# Patient Record
Sex: FEMALE | Race: WHITE | NOT HISPANIC OR LATINO | URBAN - METROPOLITAN AREA
[De-identification: names, ages, dates, MRNs, and addresses within clinical notes are randomized per-mention and may not be internally consistent; named-entity substitution may affect disease eponyms.]

---

## 2019-09-06 ENCOUNTER — INPATIENT (INPATIENT)
Facility: HOSPITAL | Age: 74
LOS: 12 days | Discharge: NURSING, REHAB OR LONG-TERM CARE | DRG: 125 | End: 2019-09-19
Attending: INTERNAL MEDICINE | Admitting: INTERNAL MEDICINE
Payer: MEDICARE

## 2019-09-06 VITALS
TEMPERATURE: 98 F | SYSTOLIC BLOOD PRESSURE: 161 MMHG | WEIGHT: 130.07 LBS | HEART RATE: 80 BPM | OXYGEN SATURATION: 98 % | HEIGHT: 66 IN | RESPIRATION RATE: 18 BRPM | DIASTOLIC BLOOD PRESSURE: 76 MMHG

## 2019-09-06 LAB
ALBUMIN SERPL ELPH-MCNC: 3.9 G/DL — SIGNIFICANT CHANGE UP (ref 3.5–5)
ALP SERPL-CCNC: 81 U/L — SIGNIFICANT CHANGE UP (ref 40–120)
ALT FLD-CCNC: 15 U/L DA — SIGNIFICANT CHANGE UP (ref 10–60)
ANION GAP SERPL CALC-SCNC: 11 MMOL/L — SIGNIFICANT CHANGE UP (ref 5–17)
AST SERPL-CCNC: 15 U/L — SIGNIFICANT CHANGE UP (ref 10–40)
BILIRUB SERPL-MCNC: 1.1 MG/DL — SIGNIFICANT CHANGE UP (ref 0.2–1.2)
BUN SERPL-MCNC: 17 MG/DL — SIGNIFICANT CHANGE UP (ref 7–18)
CALCIUM SERPL-MCNC: 9 MG/DL — SIGNIFICANT CHANGE UP (ref 8.4–10.5)
CHLORIDE SERPL-SCNC: 92 MMOL/L — LOW (ref 96–108)
CO2 SERPL-SCNC: 24 MMOL/L — SIGNIFICANT CHANGE UP (ref 22–31)
CREAT SERPL-MCNC: 0.74 MG/DL — SIGNIFICANT CHANGE UP (ref 0.5–1.3)
GLUCOSE SERPL-MCNC: 106 MG/DL — HIGH (ref 70–99)
HCT VFR BLD CALC: 43.6 % — SIGNIFICANT CHANGE UP (ref 34.5–45)
HGB BLD-MCNC: 14.4 G/DL — SIGNIFICANT CHANGE UP (ref 11.5–15.5)
MCHC RBC-ENTMCNC: 29.9 PG — SIGNIFICANT CHANGE UP (ref 27–34)
MCHC RBC-ENTMCNC: 33 GM/DL — SIGNIFICANT CHANGE UP (ref 32–36)
MCV RBC AUTO: 90.6 FL — SIGNIFICANT CHANGE UP (ref 80–100)
POTASSIUM SERPL-MCNC: 3.6 MMOL/L — SIGNIFICANT CHANGE UP (ref 3.5–5.3)
POTASSIUM SERPL-SCNC: 3.6 MMOL/L — SIGNIFICANT CHANGE UP (ref 3.5–5.3)
PROT SERPL-MCNC: 8.2 G/DL — SIGNIFICANT CHANGE UP (ref 6–8.3)
RBC # BLD: 4.81 M/UL — SIGNIFICANT CHANGE UP (ref 3.8–5.2)
RBC # FLD: 12 % — SIGNIFICANT CHANGE UP (ref 10.3–14.5)
SODIUM SERPL-SCNC: 127 MMOL/L — LOW (ref 135–145)
TROPONIN I SERPL-MCNC: <0.015 NG/ML — SIGNIFICANT CHANGE UP (ref 0–0.04)
WBC # BLD: 6.57 K/UL — SIGNIFICANT CHANGE UP (ref 3.8–10.5)
WBC # FLD AUTO: 6.57 K/UL — SIGNIFICANT CHANGE UP (ref 3.8–10.5)

## 2019-09-06 PROCEDURE — 70450 CT HEAD/BRAIN W/O DYE: CPT | Mod: 26

## 2019-09-06 PROCEDURE — 71045 X-RAY EXAM CHEST 1 VIEW: CPT | Mod: 26

## 2019-09-06 RX ORDER — VALACYCLOVIR 500 MG/1
1000 TABLET, FILM COATED ORAL ONCE
Refills: 0 | Status: COMPLETED | OUTPATIENT
Start: 2019-09-06 | End: 2019-09-06

## 2019-09-06 RX ADMIN — Medication 40 MILLIGRAM(S): at 22:01

## 2019-09-06 RX ADMIN — VALACYCLOVIR 1000 MILLIGRAM(S): 500 TABLET, FILM COATED ORAL at 22:01

## 2019-09-06 NOTE — ED PROVIDER NOTE - NS ED ROS FT
Generalized weakness x 3 days  Right upper forehead and eye weakness Generalized weakness x 3 days  Right upper forehead and eye redness

## 2019-09-06 NOTE — ED PROVIDER NOTE - CHPI ED SYMPTOMS NEG
no headache, cp, sob, dysura, abd pain, burning, itching, visual changes no headache, cp, sob, dysuria, abd pain, burning, itching, visual changes

## 2019-09-06 NOTE — ED ADULT TRIAGE NOTE - CHIEF COMPLAINT QUOTE
send by nephew for eval. generalized weakness x 3 days. swelling & redness, rash  on rt.eye. denies any fall. vomitted x1 today

## 2019-09-06 NOTE — ED PROVIDER NOTE - ATTESTATION, MLM
Nausea, vomiting, and diarrhea
I have reviewed and confirmed nurses' notes for patient's medications, allergies, medical history, and surgical history.

## 2019-09-06 NOTE — ED PROVIDER NOTE - OBJECTIVE STATEMENT
Patient is a 73 y/o female with PMHx schizophrenia and psych admissions (taking risperidone) accompanied by her nephew for generalized weakness x 3 days along with right upper forehead and eye redness; she has also been noncompliant with her medication. Patient additionally notes 1 episode of nbnb emesis. Her nephew states that she is at baseline. Patient denies burning, itching, visual changes, headache, chest pain, sob, dysuria, abd pain, burning, itching, visual changes or any other acute complaints. NKDA. Patient is a 75 y/o female with PMHx schizophrenia and psych admissions (taking risperidone) accompanied by her nephew presenting for generalized weakness x 3 days along with right upper forehead and eye redness; she has also been noncompliant with her medication. Patient additionally notes 1 episode of nbnb emesis. Her nephew states that she is at baseline. Patient denies burning, itching, visual changes, headache, chest pain, sob, dysuria, abd pain, burning, itching, visual changes or any other acute complaints. NKDA.

## 2019-09-06 NOTE — ED PROVIDER NOTE - CLINICAL SUMMARY MEDICAL DECISION MAKING FREE TEXT BOX
75 y/o female pt with generalized weakness, shingles on v1 dermatone w/ no other involvement noted. Will rule out acs, intercranial lesion versus uti versus baseline schizophrenia. Labs, ct head, ekg, urine. Give dose of valtrex, prednisone, likely will admit.

## 2019-09-06 NOTE — ED ADULT TRIAGE NOTE - ACCOMPANIED BY
PHYSICAL THERAPY RE-EVALUATION    Referring Provider:  Yeny Rahman    Diagnosis:       ICD-10-CM ICD-9-CM    1. S/P ACL reconstruction Z98.890 V45.89      Orders:  Evaluate and Treat    Date of Initial Evaluation: 12/21/17 (re-eval needed for 4/28/18)    Visit # 27    BACKGROUND:  Patient reports that approximately 1 year ago is when she initially tore her ACL. She had been performing prehab for several months until she was finally able to undergo surgery on 12/14/17. She underwent an allograft and has Wbing precautions at 40%. She reports her pain as not been too bad, but she is noticing some swelling.      SUBJECTIVE: Patient reports her knee has been doing about the same.     OBJECTIVE:          AT EVAL       TODAY  Gait: Antalgic to L side, decreased weight shift, decreased heel to toe    Sensation: intact to light touch     Knee AROM:  Flexion      WNL      Extension    WNL  Knee PROM  Flexion      WNL      Extension    WNL        Strength:  Quadriceps    4      Hamstrings    4+     Gluteus Medius   4      Gluteus Jaskaran   4     Hip Flexors    4             Function:  LEFS 55% disability on initial evaluation. Patient scored a 23.75% on re-evaluation on the LEFS on re-evaluation.    Tenderness to palpation:  Slight tenderness to L distal incision    ASSESSMENT: Patient demonstrated good tolerance to there ex and noted improvement in soft tissue to posterior knee and more movement with less pain.    TREATMENT PROVIDED:  -Manual Therapy: 8 min  STM to posterior L knee  -Therapeutic Exercise:  35 min  LE bike x 8 min  Side step ups  Sidestepping against TB  High knees/butt kicks/toe touches  Ball toss SL   Lateral lunges  Leg extension 15#  Leg press 55#  Standing gastroc stretch  Wall squats with ball  Ankle arch strengthening  -Modalities: 15 min ice + TENS to L knee with prolonged knee extension  -Education: 5 min: on condition and HEP    PLAN:  Patient will benefit from physical therapy (2-3) x/week  for (8-12) weeks including manual therapy, therapeutic exercise, functional activities, modalities, and patient education.    Thank you for this referral.    These services are reasonable and necessary for the conditions set forth above while under my care.    Yudi Mercado, PT, DPT   84K/EMT/paramedic

## 2019-09-06 NOTE — ED PROVIDER NOTE - PHYSICAL EXAMINATION
Face: vesicular lesion in v1 dermatone right  Psych:  unkempt  ao x 3  Neuro:  cranial 2-12 intact  Eyes:   PERRL  Injected on the right  No dendritic pattern noted on fluorescein stain  IOP 10  HEENT:   no lesion In ear or nose  MSK:  Extremities normal  GI:  Abd soft

## 2019-09-06 NOTE — ED PROVIDER NOTE - CHPI ED SYMPTOMS POS
VOMITING/weakness, r upper forehead & eye redness nbnb vomiting, generalized weakness, r upper forehead & eye redness/VOMITING

## 2019-09-07 DIAGNOSIS — F20.0 PARANOID SCHIZOPHRENIA: ICD-10-CM

## 2019-09-07 DIAGNOSIS — R53.1 WEAKNESS: ICD-10-CM

## 2019-09-07 DIAGNOSIS — Z29.9 ENCOUNTER FOR PROPHYLACTIC MEASURES, UNSPECIFIED: ICD-10-CM

## 2019-09-07 DIAGNOSIS — F20.9 SCHIZOPHRENIA, UNSPECIFIED: ICD-10-CM

## 2019-09-07 DIAGNOSIS — E87.1 HYPO-OSMOLALITY AND HYPONATREMIA: ICD-10-CM

## 2019-09-07 DIAGNOSIS — B02.30 ZOSTER OCULAR DISEASE, UNSPECIFIED: ICD-10-CM

## 2019-09-07 LAB
ANION GAP SERPL CALC-SCNC: 10 MMOL/L — SIGNIFICANT CHANGE UP (ref 5–17)
APPEARANCE UR: CLEAR — SIGNIFICANT CHANGE UP
BACTERIA # UR AUTO: ABNORMAL /HPF
BASOPHILS # BLD AUTO: 0 K/UL — SIGNIFICANT CHANGE UP (ref 0–0.2)
BASOPHILS NFR BLD AUTO: 0 % — SIGNIFICANT CHANGE UP (ref 0–2)
BILIRUB UR-MCNC: NEGATIVE — SIGNIFICANT CHANGE UP
BUN SERPL-MCNC: 16 MG/DL — SIGNIFICANT CHANGE UP (ref 7–18)
CALCIUM SERPL-MCNC: 7.8 MG/DL — LOW (ref 8.4–10.5)
CHLORIDE SERPL-SCNC: 98 MMOL/L — SIGNIFICANT CHANGE UP (ref 96–108)
CO2 SERPL-SCNC: 22 MMOL/L — SIGNIFICANT CHANGE UP (ref 22–31)
COLOR SPEC: YELLOW — SIGNIFICANT CHANGE UP
COMMENT - URINE: SIGNIFICANT CHANGE UP
CREAT SERPL-MCNC: 0.52 MG/DL — SIGNIFICANT CHANGE UP (ref 0.5–1.3)
DIFF PNL FLD: ABNORMAL
EOSINOPHIL # BLD AUTO: 0 K/UL — SIGNIFICANT CHANGE UP (ref 0–0.5)
EOSINOPHIL NFR BLD AUTO: 0 % — SIGNIFICANT CHANGE UP (ref 0–6)
EPI CELLS # UR: SIGNIFICANT CHANGE UP /HPF
GLUCOSE SERPL-MCNC: 105 MG/DL — HIGH (ref 70–99)
GLUCOSE UR QL: NEGATIVE — SIGNIFICANT CHANGE UP
GRAN CASTS # UR COMP ASSIST: ABNORMAL /LPF
HBA1C BLD-MCNC: 6.3 % — HIGH (ref 4–5.6)
HCT VFR BLD CALC: 34.6 % — SIGNIFICANT CHANGE UP (ref 34.5–45)
HGB BLD-MCNC: 11.8 G/DL — SIGNIFICANT CHANGE UP (ref 11.5–15.5)
KETONES UR-MCNC: ABNORMAL
LEUKOCYTE ESTERASE UR-ACNC: ABNORMAL
LYMPHOCYTES # BLD AUTO: 2.17 K/UL — SIGNIFICANT CHANGE UP (ref 1–3.3)
LYMPHOCYTES # BLD AUTO: 33 % — SIGNIFICANT CHANGE UP (ref 13–44)
MAGNESIUM SERPL-MCNC: 2 MG/DL — SIGNIFICANT CHANGE UP (ref 1.6–2.6)
MCHC RBC-ENTMCNC: 29.6 PG — SIGNIFICANT CHANGE UP (ref 27–34)
MCHC RBC-ENTMCNC: 34.1 GM/DL — SIGNIFICANT CHANGE UP (ref 32–36)
MCV RBC AUTO: 86.7 FL — SIGNIFICANT CHANGE UP (ref 80–100)
MONOCYTES # BLD AUTO: 0.46 K/UL — SIGNIFICANT CHANGE UP (ref 0–0.9)
MONOCYTES NFR BLD AUTO: 7 % — SIGNIFICANT CHANGE UP (ref 2–14)
NEUTROPHILS # BLD AUTO: 3.94 K/UL — SIGNIFICANT CHANGE UP (ref 1.8–7.4)
NEUTROPHILS NFR BLD AUTO: 60 % — SIGNIFICANT CHANGE UP (ref 43–77)
NITRITE UR-MCNC: NEGATIVE — SIGNIFICANT CHANGE UP
NRBC # BLD: 0 /100 WBCS — SIGNIFICANT CHANGE UP (ref 0–0)
PH UR: 6 — SIGNIFICANT CHANGE UP (ref 5–8)
PHOSPHATE SERPL-MCNC: 3 MG/DL — SIGNIFICANT CHANGE UP (ref 2.5–4.5)
PLATELET # BLD AUTO: 140 K/UL — LOW (ref 150–400)
PLATELET # BLD AUTO: 194 K/UL — SIGNIFICANT CHANGE UP (ref 150–400)
POTASSIUM SERPL-MCNC: 3.7 MMOL/L — SIGNIFICANT CHANGE UP (ref 3.5–5.3)
POTASSIUM SERPL-SCNC: 3.7 MMOL/L — SIGNIFICANT CHANGE UP (ref 3.5–5.3)
PROT UR-MCNC: 15
RBC # BLD: 3.99 M/UL — SIGNIFICANT CHANGE UP (ref 3.8–5.2)
RBC # FLD: 12 % — SIGNIFICANT CHANGE UP (ref 10.3–14.5)
RBC CASTS # UR COMP ASSIST: SIGNIFICANT CHANGE UP /HPF (ref 0–2)
SODIUM SERPL-SCNC: 130 MMOL/L — LOW (ref 135–145)
SP GR SPEC: 1.02 — SIGNIFICANT CHANGE UP (ref 1.01–1.02)
TSH SERPL-MCNC: 1.28 UU/ML — SIGNIFICANT CHANGE UP (ref 0.34–4.82)
UROBILINOGEN FLD QL: NEGATIVE — SIGNIFICANT CHANGE UP
VIT B12 SERPL-MCNC: 318 PG/ML — SIGNIFICANT CHANGE UP (ref 232–1245)
WBC # BLD: 6 K/UL — SIGNIFICANT CHANGE UP (ref 3.8–10.5)
WBC # FLD AUTO: 6 K/UL — SIGNIFICANT CHANGE UP (ref 3.8–10.5)
WBC UR QL: SIGNIFICANT CHANGE UP /HPF (ref 0–5)

## 2019-09-07 PROCEDURE — 99285 EMERGENCY DEPT VISIT HI MDM: CPT

## 2019-09-07 RX ORDER — SODIUM CHLORIDE 9 MG/ML
1000 INJECTION INTRAMUSCULAR; INTRAVENOUS; SUBCUTANEOUS
Refills: 0 | Status: DISCONTINUED | OUTPATIENT
Start: 2019-09-07 | End: 2019-09-07

## 2019-09-07 RX ORDER — NEOMYCIN SULFATE, POLYMYXIN B SULFATE AND HYDROCORTISONE 3.5; 10000; 1 MG/ML; [USP'U]/ML; MG/ML
1 SUSPENSION OPHTHALMIC THREE TIMES A DAY
Refills: 0 | Status: COMPLETED | OUTPATIENT
Start: 2019-09-07 | End: 2019-09-12

## 2019-09-07 RX ORDER — AMLODIPINE BESYLATE 2.5 MG/1
5 TABLET ORAL DAILY
Refills: 0 | Status: DISCONTINUED | OUTPATIENT
Start: 2019-09-07 | End: 2019-09-13

## 2019-09-07 RX ORDER — SODIUM CHLORIDE 9 MG/ML
1000 INJECTION INTRAMUSCULAR; INTRAVENOUS; SUBCUTANEOUS
Refills: 0 | Status: DISCONTINUED | OUTPATIENT
Start: 2019-09-07 | End: 2019-09-09

## 2019-09-07 RX ORDER — ENOXAPARIN SODIUM 100 MG/ML
40 INJECTION SUBCUTANEOUS DAILY
Refills: 0 | Status: DISCONTINUED | OUTPATIENT
Start: 2019-09-07 | End: 2019-09-19

## 2019-09-07 RX ORDER — VALACYCLOVIR 500 MG/1
1000 TABLET, FILM COATED ORAL THREE TIMES A DAY
Refills: 0 | Status: DISCONTINUED | OUTPATIENT
Start: 2019-09-07 | End: 2019-09-16

## 2019-09-07 RX ADMIN — SODIUM CHLORIDE 80 MILLILITER(S): 9 INJECTION INTRAMUSCULAR; INTRAVENOUS; SUBCUTANEOUS at 04:01

## 2019-09-07 RX ADMIN — VALACYCLOVIR 1000 MILLIGRAM(S): 500 TABLET, FILM COATED ORAL at 07:12

## 2019-09-07 RX ADMIN — NEOMYCIN SULFATE, POLYMYXIN B SULFATE AND HYDROCORTISONE 1 DROP(S): 3.5; 10000; 1 SUSPENSION OPHTHALMIC at 20:23

## 2019-09-07 RX ADMIN — ENOXAPARIN SODIUM 40 MILLIGRAM(S): 100 INJECTION SUBCUTANEOUS at 14:15

## 2019-09-07 RX ADMIN — VALACYCLOVIR 1000 MILLIGRAM(S): 500 TABLET, FILM COATED ORAL at 14:14

## 2019-09-07 RX ADMIN — VALACYCLOVIR 1000 MILLIGRAM(S): 500 TABLET, FILM COATED ORAL at 22:23

## 2019-09-07 RX ADMIN — SODIUM CHLORIDE 80 MILLILITER(S): 9 INJECTION INTRAMUSCULAR; INTRAVENOUS; SUBCUTANEOUS at 07:11

## 2019-09-07 RX ADMIN — SODIUM CHLORIDE 75 MILLILITER(S): 9 INJECTION INTRAMUSCULAR; INTRAVENOUS; SUBCUTANEOUS at 22:24

## 2019-09-07 RX ADMIN — AMLODIPINE BESYLATE 5 MILLIGRAM(S): 2.5 TABLET ORAL at 14:14

## 2019-09-07 NOTE — H&P ADULT - RS GEN PE MLT RESP DETAILS PC
no chest wall tenderness/clear to auscultation bilaterally/good air movement/respirations non-labored/breath sounds equal/airway patent

## 2019-09-07 NOTE — H&P ADULT - MUSCULOSKELETAL
detailed exam no joint swelling/no joint erythema/normal strength/ROM intact/no calf tenderness/no joint warmth details…

## 2019-09-07 NOTE — CONSULT NOTE ADULT - SUBJECTIVE AND OBJECTIVE BOX
HPI:  Pt is 73 y/o F with PMH of schizophrenia who presented with generalized weakness since 2 days. According to the pt, since few days she has been feeling weak and has difficultly ambulating. She has right upper forehead and eye redness. She denied pain and itching.  Pt seems confused at  times. She denied fever, shortness of breath, chest pain, loss of consciousness and all other review of systems except mentioned above. (07 Sep 2019 01:35)      PAST MEDICAL & SURGICAL HISTORY:  Schizophrenia  No significant past surgical history      Allergy Status Unknown      Meds:  amLODIPine   Tablet 5 milliGRAM(s) Oral daily  enoxaparin Injectable 40 milliGRAM(s) SubCutaneous daily  sodium chloride 0.9%. 1000 milliLiter(s) IV Continuous <Continuous>  valACYclovir 1000 milliGRAM(s) Oral three times a day      SOCIAL HISTORY:  Smoker:  YES / NO        PACK YEARS:                         WHEN QUIT?  ETOH use:  YES / NO               FREQUENCY / QUANTITY:  Ilicit Drug use:  YES / NO  Occupation:  Assisted device use (Cane / Walker):  Live with:    FAMILY HISTORY:  No pertinent family history in first degree relatives      VITALS:  Vital Signs Last 24 Hrs  T(C): 37.1 (07 Sep 2019 08:08), Max: 37.1 (07 Sep 2019 08:08)  T(F): 98.8 (07 Sep 2019 08:08), Max: 98.8 (07 Sep 2019 08:08)  HR: 51 (07 Sep 2019 08:08) (51 - 80)  BP: 180/60 (07 Sep 2019 08:08) (161/76 - 180/60)  BP(mean): --  RR: 18 (07 Sep 2019 08:08) (17 - 18)  SpO2: 100% (07 Sep 2019 08:08) (98% - 100%)    LABS/DIAGNOSTIC TESTS:                          11.8   6.00  )-----------( 194      ( 07 Sep 2019 05:14 )             34.6     WBC Count: 6.00 K/uL ( @ 05:14)  WBC Count: 6.57 K/uL ( @ 23:26)          130<L>  |  98  |  16  ----------------------------<  105<H>  3.7   |  22  |  0.52    Ca    7.8<L>      07 Sep 2019 05:14  Phos  3.0     -  Mg     2.0     -    TPro  8.2  /  Alb  3.9  /  TBili  1.1  /  DBili  x   /  AST  15  /  ALT  15  /  AlkPhos  81  -      Urinalysis Basic - ( 07 Sep 2019 11:42 )    Color: Yellow / Appearance: Clear / S.020 / pH: x  Gluc: x / Ketone: Moderate  / Bili: Negative / Urobili: Negative   Blood: x / Protein: 15 / Nitrite: Negative   Leuk Esterase: Small / RBC: 0-2 /HPF / WBC 3-5 /HPF   Sq Epi: x / Non Sq Epi: Few /HPF / Bacteria: Few /HPF        LIVER FUNCTIONS - ( 06 Sep 2019 23:26 )  Alb: 3.9 g/dL / Pro: 8.2 g/dL / ALK PHOS: 81 U/L / ALT: 15 U/L DA / AST: 15 U/L / GGT: x                 LACTATE:    ABG -     CULTURES:       RADIOLOGY:      ROS  [  ] UNABLE TO ELICIT HPI:  Pt is 73 y/o F with PMH of schizophrenia who presented with generalized weakness since 2 days. According to the pt, since few days she has been feeling weak and has difficultly ambulating. She has right upper forehead and eye redness. She denied pain and itching.  Pt seems confused at  times. She denied fever, shortness of breath, chest pain, loss of consciousness and all other review of systems except mentioned above. (07 Sep 2019 01:35)    History as above, she is confused and a poor historian, she is sleepy and is asking for the nurse to come in so she can go to the bathroom, asked  to see patient as she has shingles over her face. she c/o pain.      PAST MEDICAL & SURGICAL HISTORY:  Schizophrenia  No significant past surgical history      Allergy Status Unknown      Meds:  amLODIPine   Tablet 5 milliGRAM(s) Oral daily  enoxaparin Injectable 40 milliGRAM(s) SubCutaneous daily  sodium chloride 0.9%. 1000 milliLiter(s) IV Continuous <Continuous>  valACYclovir 1000 milliGRAM(s) Oral three times a day      SOCIAL HISTORY: unknown    FAMILY HISTORY:  No pertinent family history in first degree relatives      VITALS:  Vital Signs Last 24 Hrs  T(C): 37.1 (07 Sep 2019 08:08), Max: 37.1 (07 Sep 2019 08:08)  T(F): 98.8 (07 Sep 2019 08:08), Max: 98.8 (07 Sep 2019 08:08)  HR: 51 (07 Sep 2019 08:08) (51 - 80)  BP: 180/60 (07 Sep 2019 08:08) (161/76 - 180/60)  BP(mean): --  RR: 18 (07 Sep 2019 08:08) (17 - 18)  SpO2: 100% (07 Sep 2019 08:08) (98% - 100%)    LABS/DIAGNOSTIC TESTS:                          11.8   6.00  )-----------( 194      ( 07 Sep 2019 05:14 )             34.6     WBC Count: 6.00 K/uL ( @ 05:14)  WBC Count: 6.57 K/uL ( @ 23:26)          130<L>  |  98  |  16  ----------------------------<  105<H>  3.7   |  22  |  0.52    Ca    7.8<L>      07 Sep 2019 05:14  Phos  3.0       Mg     2.0         TPro  8.2  /  Alb  3.9  /  TBili  1.1  /  DBili  x   /  AST  15  /  ALT  15  /  AlkPhos  81        Urinalysis Basic - ( 07 Sep 2019 11:42 )    Color: Yellow / Appearance: Clear / S.020 / pH: x  Gluc: x / Ketone: Moderate  / Bili: Negative / Urobili: Negative   Blood: x / Protein: 15 / Nitrite: Negative   Leuk Esterase: Small / RBC: 0-2 /HPF / WBC 3-5 /HPF   Sq Epi: x / Non Sq Epi: Few /HPF / Bacteria: Few /HPF        LIVER FUNCTIONS - ( 06 Sep 2019 23:26 )  Alb: 3.9 g/dL / Pro: 8.2 g/dL / ALK PHOS: 81 U/L / ALT: 15 U/L DA / AST: 15 U/L / GGT: x                 LACTATE:    ABG -     CULTURES:       RADIOLOGY:< from: CT Head No Cont (19 @ 22:21) >  EXAM:  CT BRAIN                            PROCEDURE DATE:  2019          INTERPRETATION:    Clinical Indication: Right V1 shingles.    Comparison: None    Technique: Noncontrast axial CT images of the head were acquired. Coronal   and sagittal reformats were obtained.    Findings:   The ventricles and sulci are prominent in size, compatible with mild   generalized parenchymal volume loss. No acute intracranial hemorrhage is   identified.  No extra-axial fluid collection is identified.  No mass   effect or midline shift is seen.  There is no evidence of acute   territorial infarct.  There are periventricular and subcortical white   matter hypodensities, which are nonspecific, but likely reflect mild   microvascular ischemic disease.   The visualized paranasal sinuses are clear. The mastoid air cells are   well aerated.  No acute osseous abnormality is seen.       Impression: No CT evidence of acute intracranial abnormality.                SREEDHAR BROOKS M.D., ATTENDING RADIOLOGIST  This document has been electronically signed. Sep  6 2019 10:30PM        < end of copied text >        ROS  [ x ] UNABLE TO ELICIT

## 2019-09-07 NOTE — BEHAVIORAL HEALTH ASSESSMENT NOTE - NSBHCONSULTRECOMMENDOTHER_PSY_A_CORE FT
social work consult.  To schedule f/u appnt with a psychiatrist. 1. Pt DOES appear to have capacity to consent to DAVID  --If pt again displays ambivalence about recommended care, recommend reasoning with her, as she appears open to education and persuasion  2. Pt has stated she does not want family involved in her care, but again recommend persuasion and education to gain her cooperation rather than imposing family involvement over her objection  3. Recommend offering home psychiatric medication (Risperdal 0.25 mg q12h), but pt has the right to refuse as she is not acutely psychotic at this time  4. Psychiatry is signing off. Reconsult if additional issues arise as inpatient  5. Case d/w Yaquelin Rojas NP of primary team    Idalia Iniguez MD  Director, Consultation-Liaison Psychiatry Service  [id number]

## 2019-09-07 NOTE — BEHAVIORAL HEALTH ASSESSMENT NOTE - NSBHREFERDETAILS_PSY_A_CORE_FT
Patient with h/o of Schizophrenia not on meds currently was admitted with generalized weakness. Capacity to consent to DAVID

## 2019-09-07 NOTE — H&P ADULT - PROBLEM SELECTOR PLAN 1
Pt presented with generalized weakness  CT head did not show acute infarct or hemorrhage  F/u vitamin b12, vitamin D, TSH  PT evaluation

## 2019-09-07 NOTE — H&P ADULT - PROBLEM SELECTOR PLAN 5
IMPROVE VTE Individual Risk Assessment  RISK                                                          Points  [  ] Previous VTE                                                3  [  ] Thrombophilia                                             2  [  ] Lower limb paralysis                                    2        (unable to hold up >15 seconds)    [  ] Current Cancer                                             2         (within 6 months)  [  x] Immobilization > 24 hrs                              1  [  ] ICU/CCU stay > 24 hours                            1  [x  ] Age > 60                                                    1  IMPROVE VTE Score _________2    Lovenox for DVT prophylaxis

## 2019-09-07 NOTE — BEHAVIORAL HEALTH ASSESSMENT NOTE - NSBHCHARTREVIEWVS_PSY_A_CORE FT
Vital Signs Last 24 Hrs  T(C): 37.1 (07 Sep 2019 08:08), Max: 37.1 (07 Sep 2019 08:08)  T(F): 98.8 (07 Sep 2019 08:08), Max: 98.8 (07 Sep 2019 08:08)  HR: 51 (07 Sep 2019 08:08) (51 - 80)  BP: 180/60 (07 Sep 2019 08:08) (161/76 - 180/60)  BP(mean): --  RR: 18 (07 Sep 2019 08:08) (17 - 18)  SpO2: 100% (07 Sep 2019 08:08) (98% - 100%) Vital Signs Last 24 Hrs  T(C): 36.7 (13 Sep 2019 15:52), Max: 36.7 (13 Sep 2019 00:26)  T(F): 98.1 (13 Sep 2019 15:52), Max: 98.1 (13 Sep 2019 15:52)  HR: 111 (13 Sep 2019 15:52) (62 - 111)  BP: 144/77 (13 Sep 2019 15:52) (144/77 - 173/59)  BP(mean): --  RR: 16 (13 Sep 2019 15:52) (16 - 16)  SpO2: 99% (13 Sep 2019 15:52) (97% - 99%)

## 2019-09-07 NOTE — BEHAVIORAL HEALTH ASSESSMENT NOTE - HPI (INCLUDE ILLNESS QUALITY, SEVERITY, DURATION, TIMING, CONTEXT, MODIFYING FACTORS, ASSOCIATED SIGNS AND SYMPTOMS)
HPI:  Pt is 73 y/o F with PMH of schizophrenia who presented with generalized weakness since 2 days. According to the pt, since few days she has been feeling weak and has difficultly ambulating. She has right upper forehead and eye redness. She denied pain and itching.  Pt seems confused at  times. She denied fever, shortness of breath, chest pain, loss of consciousness and all other review of systems except mentioned above. (07 Sep 2019 01:35)    Patient with h/o inpatient psychTx was on Risperdal as per family member.  Patient was interviewed,chart was reviewed.Case was discussed with MD.  Patient stated: "I don't need psychiatrist any more"  Patient is very defensive, minimally verbal, periodically giggles , behaviorally controlled.  c/o poor night sleep.  Denied using alcohol or drugs.  Stated living alone.  Patient is a/o x2, poor eye contact, suspicious.  Speech is goal directed illigal at times.  Denied a/v hallucinations.Denied s/h thought.  Has paranoid delusions.Memory and cognititon-limited.I&J-limited. 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 Psych consult originally requested on 9/7 for evaluation, pt seen by Dr. Lubin who found no acute issues and signed off. New consult was requested today for capacity, as team has recommended DAVID and pt has reportedly been reluctant to agree. Pt seen in her room, calm and cooperative. 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 Pt proves to be fairly well oriented (September 2019, "close to 9-11," MARCOS off by 1 day). When recounting her personal hx, pt demonstrates some mild paranoia (she believes that a group of "people in the virtual world" is pursuing her to try to get money they think she inherited from her grandfather), but her thinking otherwise appears to be linear. Pt describes mood as "fine" and denies SI/HI/AVH. Pt completes most of the MOCA and performs well in naming, digit span, subtraction, language and abstraction, but has trouble with visual tasks (she does not have her glasses) and delayed recall. Pt reports that she lives alone without home services, relying on her super for assistance from time to time, and is very attached to her independence. However, when MD tells her it would be much better if she were to agree to DAVID to ensure a safe recovery, and afterwards to agree to more help at home, pt agrees, saying, "I would rather not, but I'll go under duress."

## 2019-09-07 NOTE — BEHAVIORAL HEALTH ASSESSMENT NOTE - SUMMARY
Pt is 73 y/o F with PMH of schizophrenia who presented with generalized weakness since 2 days. According to the pt, since few days she has been feeling weak and has difficultly ambulating. She has right upper forehead and eye redness. She denied pain and itching.  Pt seems confused at  times. She denied fever, shortness of breath, chest pain, loss of consciousness and all other review of systems except mentioned above. (07 Sep 2019 01:35)    Patient with h/o inpatient psychTx was on Risperdal as per family member.  Patient was interviewed,chart was reviewed.Case was discussed with MD.  Patient stated: "I don't need psychiatrist any more"  Patient is very defensive, minimally verbal, periodically giggles , behaviorally controlled.  c/o poor night sleep.  Denied using alcohol or drugs.  Stated living alone.  Patient is a/o x2, poor eye contact, suspicious.  Speech is goal directed illigal at times.  Denied a/v hallucinations.Denied s/h thought.  Has paranoid delusions.Memory and cognititon-limited.I&J-limited. 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 Psych consult originally requested on 9/7 for evaluation, pt seen by Dr. Lubin who found no acute issues and signed off. New consult was requested today for capacity, as team has recommended DAVID and pt has reportedly been reluctant to agree. On exam, pt is pleasant, cooperative and appropriate in affect, and apart from what appear to be persistent mild paranoid delusions about unknown people trying to get her grandfather's money, she appears to have reasonably good basic reality testing and no overt s/s of psychosis. While pt very much values her independence and prefers to live on her own, she appears able to comprehend and process MD's advice that it would be coulter for her to agree to DAVID and home services on DC in order to enable her to continue living at home safely i/s/o increasing frailty. Pt now states that she agrees to DAVID and appears to have capacity to do so. Pt does not appear to present an acute risk of harm to self or others at the time of assessment, and does not appear to be in need of admission to IP psych at the time of assessment.

## 2019-09-07 NOTE — CONSULT NOTE ADULT - GASTROINTESTINAL DETAILS
no guarding/no organomegaly/no rigidity/no masses palpable/bowel sounds normal/no distention/soft/nontender

## 2019-09-07 NOTE — BEHAVIORAL HEALTH ASSESSMENT NOTE - OTHER PAST PSYCHIATRIC HISTORY (INCLUDE DETAILS REGARDING ONSET, COURSE OF ILLNESS, INPATIENT/OUTPATIENT TREATMENT)
H/o paranoid schizophrenia per chart (no details available). Pt says her daughter had her involuntarily admitted to IP psych for a couple of weeks in the recent past.

## 2019-09-07 NOTE — H&P ADULT - ASSESSMENT
Pt is 75 y/o F with PMH of schizophrenia who presented with generalized weakness since 2 days.                          14.4   6.57  )-----------( 140      ( 06 Sep 2019 23:26 )             43.6       09-06    127<L>  |  92<L>  |  17  ----------------------------<  106<H>  3.6   |  24  |  0.74    Ca    9.0      06 Sep 2019 23:26    TPro  8.2  /  Alb  3.9  /  TBili  1.1  /  DBili  x   /  AST  15  /  ALT  15  /  AlkPhos  81  09-06

## 2019-09-07 NOTE — BEHAVIORAL HEALTH ASSESSMENT NOTE - NSBHCHARTREVIEWIMAGING_PSY_A_CORE FT
EXAM:  CT BRAIN                            PROCEDURE DATE:  09/06/2019          INTERPRETATION:    Clinical Indication: Right V1 shingles.    Comparison: None    Technique: Noncontrast axial CT images of the head were acquired. Coronal   and sagittal reformats were obtained.    Findings:   The ventricles and sulci are prominent in size, compatible with mild   generalized parenchymal volume loss. No acute intracranial hemorrhage is   identified.  No extra-axial fluid collection is identified.  No mass   effect or midline shift is seen.  There is no evidence of acute   territorial infarct.  There are periventricular and subcortical white   matter hypodensities, which are nonspecific, but likely reflect mild   microvascular ischemic disease.   The visualized paranasal sinuses are clear. The mastoid air cells are   well aerated.  No acute osseous abnormality is seen.       Impression: No CT evidence of acute intracranial abnormality.

## 2019-09-07 NOTE — BEHAVIORAL HEALTH ASSESSMENT NOTE - NSBHCHARTREVIEWLAB_PSY_A_CORE FT
CBC Full  -  ( 07 Sep 2019 05:14 )  WBC Count : 6.00 K/uL  RBC Count : 3.99 M/uL  Hemoglobin : 11.8 g/dL  Hematocrit : 34.6 %  Platelet Count - Automated : 194 K/uL  Mean Cell Volume : 86.7 fl  Mean Cell Hemoglobin : 29.6 pg  Mean Cell Hemoglobin Concentration : 34.1 gm/dL  Auto Neutrophil # : x  Auto Lymphocyte # : x  Auto Monocyte # : x  Auto Eosinophil # : x  Auto Basophil # : x  Auto Neutrophil % : x  Auto Lymphocyte % : x  Auto Monocyte % : x  Auto Eosinophil % : x  Auto Basophil % : x

## 2019-09-07 NOTE — BEHAVIORAL HEALTH ASSESSMENT NOTE - RISK ASSESSMENT
Pt's risk factors are older age and reported paranoid schizophrenia. She does have protective factors including absent h/o suicidality, aggression or WHYTE, above-average intelligence, future orientation and enjoyment of life. Risk level appears low at the time of assessment.

## 2019-09-07 NOTE — H&P ADULT - PROBLEM SELECTOR PLAN 4
IMPROVE VTE Individual Risk Assessment  RISK                                                          Points  [  ] Previous VTE                                                3  [  ] Thrombophilia                                             2  [  ] Lower limb paralysis                                    2        (unable to hold up >15 seconds)    [  ] Current Cancer                                             2         (within 6 months)  [  x] Immobilization > 24 hrs                              1  [  ] ICU/CCU stay > 24 hours                            1  [x  ] Age > 60                                                    1  IMPROVE VTE Score _________2    Lovenox for DVT prophylaxis Pt takes risperidone at home  Continue home medications Pt takes risperidone at home, not sure about dosage  We will need to call pharmacy for medications.  Continue home medications Pt takes risperidone at home, not sure about dosage  We will need to call pharmacy for medications. Family unaware of her home medications  Continue home medications

## 2019-09-07 NOTE — H&P ADULT - ATTENDING COMMENTS
Seen and examined . I feel weak. Agree with above A/P . Psychiatry and ID consulted .BP hiogh so started Amlodipine.

## 2019-09-07 NOTE — H&P ADULT - HISTORY OF PRESENT ILLNESS
Pt is 73 y/o F with PMH of schizophrenia who presented with generalized weakness since 2 days. According to the pt, since few days she has been feeling weak and has difficultly ambulating. Pt seems confused at  times. She denied fever, shortness of breath, chest pain, loss of consciousness and all other review of systems except mentioned above. Pt is 73 y/o F with PMH of schizophrenia who presented with generalized weakness since 2 days. According to the pt, since few days she has been feeling weak and has difficultly ambulating. She has right upper forehead and eye redness. She denied pain and itching.  Pt seems confused at  times. She denied fever, shortness of breath, chest pain, loss of consciousness and all other review of systems except mentioned above.

## 2019-09-07 NOTE — H&P ADULT - NEGATIVE OPHTHALMOLOGIC SYMPTOMS
no blurred vision L/no lacrimation R/no lacrimation L/no photophobia/no diplopia no blurred vision L/no blurred vision R/no lacrimation L/no lacrimation R/no diplopia/no photophobia

## 2019-09-07 NOTE — CONSULT NOTE ADULT - ASSESSMENT
Herpes Zoster   Right eye Conjunctivitis    Plan - cont Valtrex 1gm po TID  start cortisporin eyedrops 1gtt in right eye TID x 5 days.

## 2019-09-07 NOTE — ED ADULT NURSE REASSESSMENT NOTE - NS ED NURSE REASSESS COMMENT FT1
Pt reassessed, observed laying in bed, breathing room air, in no respiratory distress at time of reassessment. Pt is A&O x2, confused, however able to make needs known. Skin intact, except for rash observed to right eye area and right side of forehead, no sacral ulcers noted. Right AC #[id number] in place, meds administered as ordered. Admitted to [facility], report given to JUDIT Mitchell for [address]. Awaiting transport, nursing monitoring continues.

## 2019-09-08 LAB
ANION GAP SERPL CALC-SCNC: 5 MMOL/L — SIGNIFICANT CHANGE UP (ref 5–17)
ANION GAP SERPL CALC-SCNC: 6 MMOL/L — SIGNIFICANT CHANGE UP (ref 5–17)
BUN SERPL-MCNC: 11 MG/DL — SIGNIFICANT CHANGE UP (ref 7–18)
BUN SERPL-MCNC: 12 MG/DL — SIGNIFICANT CHANGE UP (ref 7–18)
CALCIUM SERPL-MCNC: 7.9 MG/DL — LOW (ref 8.4–10.5)
CALCIUM SERPL-MCNC: 8.1 MG/DL — LOW (ref 8.4–10.5)
CHLORIDE SERPL-SCNC: 95 MMOL/L — LOW (ref 96–108)
CHLORIDE SERPL-SCNC: 99 MMOL/L — SIGNIFICANT CHANGE UP (ref 96–108)
CO2 SERPL-SCNC: 25 MMOL/L — SIGNIFICANT CHANGE UP (ref 22–31)
CO2 SERPL-SCNC: 29 MMOL/L — SIGNIFICANT CHANGE UP (ref 22–31)
CREAT SERPL-MCNC: 0.68 MG/DL — SIGNIFICANT CHANGE UP (ref 0.5–1.3)
CREAT SERPL-MCNC: 0.73 MG/DL — SIGNIFICANT CHANGE UP (ref 0.5–1.3)
CULTURE RESULTS: SIGNIFICANT CHANGE UP
GLUCOSE SERPL-MCNC: 123 MG/DL — HIGH (ref 70–99)
GLUCOSE SERPL-MCNC: 202 MG/DL — HIGH (ref 70–99)
HCT VFR BLD CALC: 38.4 % — SIGNIFICANT CHANGE UP (ref 34.5–45)
HGB BLD-MCNC: 12.8 G/DL — SIGNIFICANT CHANGE UP (ref 11.5–15.5)
MAGNESIUM SERPL-MCNC: 1.9 MG/DL — SIGNIFICANT CHANGE UP (ref 1.6–2.6)
MCHC RBC-ENTMCNC: 29.2 PG — SIGNIFICANT CHANGE UP (ref 27–34)
MCHC RBC-ENTMCNC: 33.3 GM/DL — SIGNIFICANT CHANGE UP (ref 32–36)
MCV RBC AUTO: 87.7 FL — SIGNIFICANT CHANGE UP (ref 80–100)
NRBC # BLD: 0 /100 WBCS — SIGNIFICANT CHANGE UP (ref 0–0)
PLATELET # BLD AUTO: 210 K/UL — SIGNIFICANT CHANGE UP (ref 150–400)
POTASSIUM SERPL-MCNC: 2.8 MMOL/L — CRITICAL LOW (ref 3.5–5.3)
POTASSIUM SERPL-MCNC: 4.2 MMOL/L — SIGNIFICANT CHANGE UP (ref 3.5–5.3)
POTASSIUM SERPL-SCNC: 2.8 MMOL/L — CRITICAL LOW (ref 3.5–5.3)
POTASSIUM SERPL-SCNC: 4.2 MMOL/L — SIGNIFICANT CHANGE UP (ref 3.5–5.3)
RBC # BLD: 4.38 M/UL — SIGNIFICANT CHANGE UP (ref 3.8–5.2)
RBC # FLD: 12.2 % — SIGNIFICANT CHANGE UP (ref 10.3–14.5)
SODIUM SERPL-SCNC: 129 MMOL/L — LOW (ref 135–145)
SODIUM SERPL-SCNC: 130 MMOL/L — LOW (ref 135–145)
SPECIMEN SOURCE: SIGNIFICANT CHANGE UP
WBC # BLD: 6.51 K/UL — SIGNIFICANT CHANGE UP (ref 3.8–10.5)
WBC # FLD AUTO: 6.51 K/UL — SIGNIFICANT CHANGE UP (ref 3.8–10.5)

## 2019-09-08 RX ORDER — POTASSIUM CHLORIDE 20 MEQ
10 PACKET (EA) ORAL
Refills: 0 | Status: COMPLETED | OUTPATIENT
Start: 2019-09-08 | End: 2019-09-08

## 2019-09-08 RX ORDER — POTASSIUM CHLORIDE 20 MEQ
40 PACKET (EA) ORAL EVERY 4 HOURS
Refills: 0 | Status: COMPLETED | OUTPATIENT
Start: 2019-09-08 | End: 2019-09-08

## 2019-09-08 RX ADMIN — ENOXAPARIN SODIUM 40 MILLIGRAM(S): 100 INJECTION SUBCUTANEOUS at 11:52

## 2019-09-08 RX ADMIN — AMLODIPINE BESYLATE 5 MILLIGRAM(S): 2.5 TABLET ORAL at 06:11

## 2019-09-08 RX ADMIN — Medication 100 MILLIEQUIVALENT(S): at 14:54

## 2019-09-08 RX ADMIN — VALACYCLOVIR 1000 MILLIGRAM(S): 500 TABLET, FILM COATED ORAL at 21:25

## 2019-09-08 RX ADMIN — Medication 100 MILLIEQUIVALENT(S): at 11:52

## 2019-09-08 RX ADMIN — NEOMYCIN SULFATE, POLYMYXIN B SULFATE AND HYDROCORTISONE 1 DROP(S): 3.5; 10000; 1 SUSPENSION OPHTHALMIC at 13:02

## 2019-09-08 RX ADMIN — VALACYCLOVIR 1000 MILLIGRAM(S): 500 TABLET, FILM COATED ORAL at 06:11

## 2019-09-08 RX ADMIN — NEOMYCIN SULFATE, POLYMYXIN B SULFATE AND HYDROCORTISONE 1 DROP(S): 3.5; 10000; 1 SUSPENSION OPHTHALMIC at 06:11

## 2019-09-08 RX ADMIN — Medication 100 MILLIEQUIVALENT(S): at 13:01

## 2019-09-08 RX ADMIN — Medication 40 MILLIEQUIVALENT(S): at 11:52

## 2019-09-08 RX ADMIN — Medication 40 MILLIEQUIVALENT(S): at 14:54

## 2019-09-08 RX ADMIN — NEOMYCIN SULFATE, POLYMYXIN B SULFATE AND HYDROCORTISONE 1 DROP(S): 3.5; 10000; 1 SUSPENSION OPHTHALMIC at 21:25

## 2019-09-08 RX ADMIN — SODIUM CHLORIDE 75 MILLILITER(S): 9 INJECTION INTRAMUSCULAR; INTRAVENOUS; SUBCUTANEOUS at 11:50

## 2019-09-08 RX ADMIN — VALACYCLOVIR 1000 MILLIGRAM(S): 500 TABLET, FILM COATED ORAL at 13:02

## 2019-09-08 NOTE — PROGRESS NOTE ADULT - ASSESSMENT
Problem/Plan - 1:  ·  Problem: Generalized weakness.  Plan: Unknown cause .  Pt presented with generalized weakness  CT head did not show acute infarct or hemorrhage  F/u vitamin b12, vitamin D, TSH  PT evaluation.      Problem/Plan - 2:  ·  Problem: Herpes zoster ophthalmicus, right eye.  Plan: Pt has erythema and scaly lesion at V1 dermatome, around right eye  Continue valacyclovir.   ID helping .   Problem/Plan - 3:  ·  Problem: Hyponatremia/ Hypokalemia .  Plan: Correcting.     Problem/Plan - 4:  ·  Problem: Schizophrenia.  Plan: Pt takes risperidone at home, not sure about dosage  We will need to call pharmacy for medications. Family unaware of her home medications  Continue home medications.      Problem/Plan - 5:  ·  Problem: Prophylactic measure.  Plan:  Lovenox for DVT prophylaxis.     Disposition : DC planning . standing/actual

## 2019-09-08 NOTE — PROGRESS NOTE ADULT - SUBJECTIVE AND OBJECTIVE BOX
INTERVAL HPI/OVERNIGHT EVENTS: i feel weak n  Vital Signs Last 24 Hrs  T(C): 36.7 (08 Sep 2019 15:51), Max: 37.2 (08 Sep 2019 00:11)  T(F): 98.1 (08 Sep 2019 15:51), Max: 99 (08 Sep 2019 00:11)  HR: 75 (08 Sep 2019 15:51) (69 - 79)  BP: 155/54 (08 Sep 2019 15:51) (152/62 - 169/76)  BP(mean): 97 (08 Sep 2019 07:40) (97 - 97)  RR: 18 (08 Sep 2019 15:51) (17 - 18)  SpO2: 100% (08 Sep 2019 15:51) (99% - 100%)  I&O's Summary    MEDICATIONS  (STANDING):  amLODIPine   Tablet 5 milliGRAM(s) Oral daily  enoxaparin Injectable 40 milliGRAM(s) SubCutaneous daily  hydrocortisone/polymyxin/neomycin Suspension (CORTISPORIN) 1 Drop(s) Right EYE three times a day  sodium chloride 0.9%. 1000 milliLiter(s) (75 mL/Hr) IV Continuous <Continuous>  valACYclovir 1000 milliGRAM(s) Oral three times a day    MEDICATIONS  (PRN):    LABS:                        12.8   6.51  )-----------( 210      ( 08 Sep 2019 07:02 )             38.4     09-08    130<L>  |  99  |  12  ----------------------------<  202<H>  4.2   |  25  |  0.73    Ca    8.1<L>      08 Sep 2019 18:28  Phos  3.0     09-07  Mg     1.9     -08    TPro  8.2  /  Alb  3.9  /  TBili  1.1  /  DBili  x   /  AST  15  /  ALT  15  /  AlkPhos  81  09-06      Urinalysis Basic - ( 07 Sep 2019 11:42 )    Color: Yellow / Appearance: Clear / S.020 / pH: x  Gluc: x / Ketone: Moderate  / Bili: Negative / Urobili: Negative   Blood: x / Protein: 15 / Nitrite: Negative   Leuk Esterase: Small / RBC: 0-2 /HPF / WBC 3-5 /HPF   Sq Epi: x / Non Sq Epi: Few /HPF / Bacteria: Few /HPF      CAPILLARY BLOOD GLUCOSE            Urinalysis Basic - ( 07 Sep 2019 11:42 )    Color: Yellow / Appearance: Clear / S.020 / pH: x  Gluc: x / Ketone: Moderate  / Bili: Negative / Urobili: Negative   Blood: x / Protein: 15 / Nitrite: Negative   Leuk Esterase: Small / RBC: 0-2 /HPF / WBC 3-5 /HPF   Sq Epi: x / Non Sq Epi: Few /HPF / Bacteria: Few /HPF        RADIOLOGY & ADDITIONAL TESTS:    Consultant(s) Notes Reviewed:  [x ] YES  [ ] NO    PHYSICAL EXAM:  GENERAL: NAD, well-groomed, well-developed,not in any distress ,  HEAD:  Atraumatic, Normocephalic  EYES: EOMI, PERRLA, conjunctiva and sclera congestion RT eye   ENMT: No tonsillar erythema, exudates, or enlargement; Moist mucous membranes, Good dentition, No lesions  NECK: Supple, No JVD, Normal thyroid  NERVOUS SYSTEM:  Alert & Oriented X2, No focal deficit   CHEST/LUNG: Good air entry bilateral with no  rales, rhonchi, wheezing, or rubs  HEART: Regular rate and rhythm; No murmurs, rubs, or gallops  ABDOMEN: Soft, Nontender, Nondistended; Bowel sounds present  EXTREMITIES:  2+ Peripheral Pulses, No clubbing, cyanosis, or edema  SKIN: Rt side face and scalp     Care Discussed with Consultants/Other Providers [ x] YES  [ ] NO

## 2019-09-09 ENCOUNTER — TRANSCRIPTION ENCOUNTER (OUTPATIENT)
Age: 74
End: 2019-09-09

## 2019-09-09 DIAGNOSIS — R73.03 PREDIABETES: ICD-10-CM

## 2019-09-09 DIAGNOSIS — I10 ESSENTIAL (PRIMARY) HYPERTENSION: ICD-10-CM

## 2019-09-09 DIAGNOSIS — H10.9 UNSPECIFIED CONJUNCTIVITIS: ICD-10-CM

## 2019-09-09 LAB
ANION GAP SERPL CALC-SCNC: 6 MMOL/L — SIGNIFICANT CHANGE UP (ref 5–17)
BUN SERPL-MCNC: 10 MG/DL — SIGNIFICANT CHANGE UP (ref 7–18)
CALCIUM SERPL-MCNC: 7.7 MG/DL — LOW (ref 8.4–10.5)
CHLORIDE SERPL-SCNC: 98 MMOL/L — SIGNIFICANT CHANGE UP (ref 96–108)
CO2 SERPL-SCNC: 26 MMOL/L — SIGNIFICANT CHANGE UP (ref 22–31)
CREAT SERPL-MCNC: 0.51 MG/DL — SIGNIFICANT CHANGE UP (ref 0.5–1.3)
GLUCOSE SERPL-MCNC: 123 MG/DL — HIGH (ref 70–99)
MAGNESIUM SERPL-MCNC: 1.8 MG/DL — SIGNIFICANT CHANGE UP (ref 1.6–2.6)
POTASSIUM SERPL-MCNC: 3.4 MMOL/L — LOW (ref 3.5–5.3)
POTASSIUM SERPL-SCNC: 3.4 MMOL/L — LOW (ref 3.5–5.3)
SODIUM SERPL-SCNC: 130 MMOL/L — LOW (ref 135–145)

## 2019-09-09 RX ORDER — ACETAMINOPHEN 500 MG
650 TABLET ORAL EVERY 6 HOURS
Refills: 0 | Status: DISCONTINUED | OUTPATIENT
Start: 2019-09-09 | End: 2019-09-19

## 2019-09-09 RX ORDER — POTASSIUM CHLORIDE 20 MEQ
40 PACKET (EA) ORAL EVERY 4 HOURS
Refills: 0 | Status: COMPLETED | OUTPATIENT
Start: 2019-09-09 | End: 2019-09-09

## 2019-09-09 RX ORDER — RISPERIDONE 4 MG/1
0.25 TABLET ORAL EVERY 12 HOURS
Refills: 0 | Status: DISCONTINUED | OUTPATIENT
Start: 2019-09-09 | End: 2019-09-19

## 2019-09-09 RX ORDER — MAGNESIUM OXIDE 400 MG ORAL TABLET 241.3 MG
400 TABLET ORAL
Refills: 0 | Status: COMPLETED | OUTPATIENT
Start: 2019-09-09 | End: 2019-09-10

## 2019-09-09 RX ADMIN — RISPERIDONE 0.25 MILLIGRAM(S): 4 TABLET ORAL at 18:41

## 2019-09-09 RX ADMIN — Medication 650 MILLIGRAM(S): at 18:40

## 2019-09-09 RX ADMIN — Medication 650 MILLIGRAM(S): at 19:19

## 2019-09-09 RX ADMIN — Medication 650 MILLIGRAM(S): at 23:45

## 2019-09-09 RX ADMIN — AMLODIPINE BESYLATE 5 MILLIGRAM(S): 2.5 TABLET ORAL at 06:31

## 2019-09-09 RX ADMIN — MAGNESIUM OXIDE 400 MG ORAL TABLET 400 MILLIGRAM(S): 241.3 TABLET ORAL at 13:59

## 2019-09-09 RX ADMIN — VALACYCLOVIR 1000 MILLIGRAM(S): 500 TABLET, FILM COATED ORAL at 22:34

## 2019-09-09 RX ADMIN — Medication 40 MILLIEQUIVALENT(S): at 13:59

## 2019-09-09 RX ADMIN — MAGNESIUM OXIDE 400 MG ORAL TABLET 400 MILLIGRAM(S): 241.3 TABLET ORAL at 18:40

## 2019-09-09 RX ADMIN — NEOMYCIN SULFATE, POLYMYXIN B SULFATE AND HYDROCORTISONE 1 DROP(S): 3.5; 10000; 1 SUSPENSION OPHTHALMIC at 06:31

## 2019-09-09 RX ADMIN — VALACYCLOVIR 1000 MILLIGRAM(S): 500 TABLET, FILM COATED ORAL at 13:59

## 2019-09-09 RX ADMIN — Medication 650 MILLIGRAM(S): at 12:35

## 2019-09-09 RX ADMIN — ENOXAPARIN SODIUM 40 MILLIGRAM(S): 100 INJECTION SUBCUTANEOUS at 11:36

## 2019-09-09 RX ADMIN — NEOMYCIN SULFATE, POLYMYXIN B SULFATE AND HYDROCORTISONE 1 DROP(S): 3.5; 10000; 1 SUSPENSION OPHTHALMIC at 22:35

## 2019-09-09 RX ADMIN — Medication 650 MILLIGRAM(S): at 11:35

## 2019-09-09 RX ADMIN — NEOMYCIN SULFATE, POLYMYXIN B SULFATE AND HYDROCORTISONE 1 DROP(S): 3.5; 10000; 1 SUSPENSION OPHTHALMIC at 13:59

## 2019-09-09 RX ADMIN — Medication 40 MILLIEQUIVALENT(S): at 18:41

## 2019-09-09 RX ADMIN — VALACYCLOVIR 1000 MILLIGRAM(S): 500 TABLET, FILM COATED ORAL at 06:31

## 2019-09-09 NOTE — PROGRESS NOTE ADULT - PROBLEM SELECTOR PLAN 2
Erythema and scaly lesion at V1 dermatome, around right eye  Continue valacyclovir.  ID, Dr. Matos following

## 2019-09-09 NOTE — PROGRESS NOTE ADULT - PROBLEM SELECTOR PLAN 1
Pt presented with generalized weakness  CT head did not show acute infarct or hemorrhage  awaiting PT evaluation.

## 2019-09-09 NOTE — PROGRESS NOTE ADULT - SUBJECTIVE AND OBJECTIVE BOX
NP Note discussed with Primary Attending    75 y/o F with PMH of schizophrenia who presented with generalized weakness since 2 days with difficultly ambulating. Patient noted to have right upper forehead rash and right eye redness.      INTERVAL HPI/OVERNIGHT EVENTS: Awake, lying in bed, in no acute distress    MEDICATIONS  (STANDING):  amLODIPine   Tablet 5 milliGRAM(s) Oral daily  enoxaparin Injectable 40 milliGRAM(s) SubCutaneous daily  hydrocortisone/polymyxin/neomycin Suspension (CORTISPORIN) 1 Drop(s) Right EYE three times a day  magnesium oxide 400 milliGRAM(s) Oral three times a day with meals  potassium chloride    Tablet ER 40 milliEquivalent(s) Oral every 4 hours  risperiDONE   Tablet 0.25 milliGRAM(s) Oral every 12 hours  sodium chloride 0.9%. 1000 milliLiter(s) (75 mL/Hr) IV Continuous <Continuous>  valACYclovir 1000 milliGRAM(s) Oral three times a day    MEDICATIONS  (PRN):  acetaminophen   Tablet .. 650 milliGRAM(s) Oral every 6 hours PRN Mild Pain (1 - 3)      __________________________________________________  REVIEW OF SYSTEMS:    CONSTITUTIONAL: No fever, no chills  EYES: no acute visual disturbances  NECK: No pain or stiffness  RESPIRATORY: No cough; No shortness of breath  CARDIOVASCULAR: No chest pain, no palpitations  GASTROINTESTINAL: No pain. No nausea or vomiting; No diarrhea   NEUROLOGICAL: No headache or numbness, no tremors  MUSCULOSKELETAL: No joint pain, no muscle pain  GENITOURINARY: no dysuria, no frequency, no hesitancy  PSYCHIATRY: h/o schizophrenia      Vital Signs Last 24 Hrs  T(C): 37.3 (09 Sep 2019 00:39), Max: 37.3 (09 Sep 2019 00:39)  T(F): 99.2 (09 Sep 2019 00:39), Max: 99.2 (09 Sep 2019 00:39)  HR: 78 (09 Sep 2019 05:43) (70 - 78)  BP: 135/71 (09 Sep 2019 05:43) (135/71 - 155/54)  BP(mean): --  RR: 17 (09 Sep 2019 00:39) (17 - 18)  SpO2: 98% (09 Sep 2019 00:39) (98% - 100%)    ________________________________________________  PHYSICAL EXAM:  GENERAL: NAD  HEENT: Normocephalic; right eye redness, left conjunctivae and sclerae clear; moist mucous membranes   NECK: supple, no jvd  CHEST/LUNG: Clear to auscultation bilaterally with good air entry   HEART: S1 S2 regular; no murmurs, gallops or rubs  ABDOMEN: Soft, nontender, nondistended; bowel sounds present  EXTREMITIES: no cyanosis; no edema; no calf tenderness  SKIN: warm and dry; rash to right forehead  NERVOUS SYSTEM:  Awake and alert; Oriented to place, person and time; no new deficits    _________________________________________________  LABS:                        12.8   6.51  )-----------( 210      ( 08 Sep 2019 07:02 )             38.4     09-09    130<L>  |  98  |  10  ----------------------------<  123<H>  3.4<L>   |  26  |  0.51    Ca    7.7<L>      09 Sep 2019 07:09  Mg     1.8     09-09          CAPILLARY BLOOD GLUCOSE            RADIOLOGY & ADDITIONAL TESTS:    Consultant(s) Notes Reviewed:   YES    Care Discussed with Consultants : Psychiatry, ID    Plan of care was discussed with patient and /or primary care giver; all questions and concerns were addressed and care was aligned with patient's wishes.

## 2019-09-09 NOTE — PROGRESS NOTE ADULT - SUBJECTIVE AND OBJECTIVE BOX
INTERVAL HPI/OVERNIGHT EVENTS: I feel fine.   Vital Signs Last 24 Hrs  T(C): 36.7 (09 Sep 2019 15:43), Max: 37.3 (09 Sep 2019 00:39)  T(F): 98 (09 Sep 2019 15:43), Max: 99.2 (09 Sep 2019 00:39)  HR: 75 (09 Sep 2019 15:43) (70 - 78)  BP: 134/46 (09 Sep 2019 15:43) (134/46 - 151/55)  BP(mean): --  RR: 18 (09 Sep 2019 15:43) (17 - 18)  SpO2: 95% (09 Sep 2019 15:43) (95% - 98%)  I&O's Summary    MEDICATIONS  (STANDING):  amLODIPine   Tablet 5 milliGRAM(s) Oral daily  enoxaparin Injectable 40 milliGRAM(s) SubCutaneous daily  hydrocortisone/polymyxin/neomycin Suspension (CORTISPORIN) 1 Drop(s) Right EYE three times a day  magnesium oxide 400 milliGRAM(s) Oral three times a day with meals  risperiDONE   Tablet 0.25 milliGRAM(s) Oral every 12 hours  valACYclovir 1000 milliGRAM(s) Oral three times a day    MEDICATIONS  (PRN):  acetaminophen   Tablet .. 650 milliGRAM(s) Oral every 6 hours PRN Mild Pain (1 - 3)    LABS:                        12.8   6.51  )-----------( 210      ( 08 Sep 2019 07:02 )             38.4     09-09    130<L>  |  98  |  10  ----------------------------<  123<H>  3.4<L>   |  26  |  0.51    Ca    7.7<L>      09 Sep 2019 07:09  Mg     1.8     09-09          CAPILLARY BLOOD GLUCOSE              REVIEW OF SYSTEMS:  CONSTITUTIONAL: No fever, weight loss, or fatigue  EYES: Rt eye congestion  discharge  ENMT:  No difficulty hearing, tinnitus, vertigo; No sinus or throat pain  NECK: No pain or stiffness  BREASTS: No pain, masses, or nipple discharge  RESPIRATORY: No cough, wheezing, chills or hemoptysis; No shortness of breath  CARDIOVASCULAR: No chest pain, palpitations, dizziness, or leg swelling  GASTROINTESTINAL: No abdominal or epigastric pain. No nausea, vomiting, or hematemesis; No diarrhea or constipation. No melena or hematochezia.  GENITOURINARY: No dysuria, frequency, hematuria, or incontinence  NEUROLOGICAL: No headaches, memory loss, loss of strength, numbness, or tremors  SKIN: crusted lesions     Consultant(s) Notes Reviewed:  [x ] YES  [ ] NO    PHYSICAL EXAM:  GENERAL: NAD, well-groomed, well-developed,not in any distress ,  HEAD:  Atraumatic, Normocephalic  EYES: EOMI, PERRLA, Rt conjunctiva and sclera cogestion   ENMT: No tonsillar erythema, exudates, or enlargement; Moist mucous membranes, Good dentition, No lesions  NECK: Supple, No JVD, Normal thyroid  NERVOUS SYSTEM:  Alert & Oriented X3, No focal deficit   CHEST/LUNG: Good air entry bilateral with no  rales, rhonchi, wheezing, or rubs  HEART: Regular rate and rhythm; No murmurs, rubs, or gallops  ABDOMEN: Soft, Nontender, Nondistended; Bowel sounds present  EXTREMITIES:  2+ Peripheral Pulses, No clubbing, cyanosis, or edema  SKIN: Crusted lesions Rt side forehead and face     Care Discussed with Consultants/Other Providers [ x] YES  [ ] NO

## 2019-09-10 DIAGNOSIS — Z02.9 ENCOUNTER FOR ADMINISTRATIVE EXAMINATIONS, UNSPECIFIED: ICD-10-CM

## 2019-09-10 LAB
ANION GAP SERPL CALC-SCNC: 3 MMOL/L — LOW (ref 5–17)
BUN SERPL-MCNC: 16 MG/DL — SIGNIFICANT CHANGE UP (ref 7–18)
CALCIUM SERPL-MCNC: 8.2 MG/DL — LOW (ref 8.4–10.5)
CHLORIDE SERPL-SCNC: 102 MMOL/L — SIGNIFICANT CHANGE UP (ref 96–108)
CO2 SERPL-SCNC: 29 MMOL/L — SIGNIFICANT CHANGE UP (ref 22–31)
CREAT SERPL-MCNC: 0.62 MG/DL — SIGNIFICANT CHANGE UP (ref 0.5–1.3)
GLUCOSE SERPL-MCNC: 146 MG/DL — HIGH (ref 70–99)
HCT VFR BLD CALC: 36.5 % — SIGNIFICANT CHANGE UP (ref 34.5–45)
HGB BLD-MCNC: 11.8 G/DL — SIGNIFICANT CHANGE UP (ref 11.5–15.5)
MAGNESIUM SERPL-MCNC: 2.2 MG/DL — SIGNIFICANT CHANGE UP (ref 1.6–2.6)
MCHC RBC-ENTMCNC: 29.4 PG — SIGNIFICANT CHANGE UP (ref 27–34)
MCHC RBC-ENTMCNC: 32.3 GM/DL — SIGNIFICANT CHANGE UP (ref 32–36)
MCV RBC AUTO: 90.8 FL — SIGNIFICANT CHANGE UP (ref 80–100)
NRBC # BLD: 0 /100 WBCS — SIGNIFICANT CHANGE UP (ref 0–0)
PLATELET # BLD AUTO: 187 K/UL — SIGNIFICANT CHANGE UP (ref 150–400)
POTASSIUM SERPL-MCNC: 4.5 MMOL/L — SIGNIFICANT CHANGE UP (ref 3.5–5.3)
POTASSIUM SERPL-SCNC: 4.5 MMOL/L — SIGNIFICANT CHANGE UP (ref 3.5–5.3)
RBC # BLD: 4.02 M/UL — SIGNIFICANT CHANGE UP (ref 3.8–5.2)
RBC # FLD: 12.5 % — SIGNIFICANT CHANGE UP (ref 10.3–14.5)
SODIUM SERPL-SCNC: 134 MMOL/L — LOW (ref 135–145)
WBC # BLD: 4.94 K/UL — SIGNIFICANT CHANGE UP (ref 3.8–10.5)
WBC # FLD AUTO: 4.94 K/UL — SIGNIFICANT CHANGE UP (ref 3.8–10.5)

## 2019-09-10 RX ORDER — ACETAMINOPHEN 500 MG
2 TABLET ORAL
Qty: 0 | Refills: 0 | DISCHARGE
Start: 2019-09-10

## 2019-09-10 RX ORDER — RISPERIDONE 4 MG/1
1 TABLET ORAL
Qty: 0 | Refills: 0 | DISCHARGE
Start: 2019-09-10

## 2019-09-10 RX ORDER — NEOMYCIN SULFATE, POLYMYXIN B SULFATE AND HYDROCORTISONE 3.5; 10000; 1 MG/ML; [USP'U]/ML; MG/ML
1 SUSPENSION OPHTHALMIC
Qty: 0 | Refills: 0 | DISCHARGE
Start: 2019-09-10

## 2019-09-10 RX ORDER — VALACYCLOVIR 500 MG/1
1 TABLET, FILM COATED ORAL
Qty: 0 | Refills: 0 | DISCHARGE
Start: 2019-09-10 | End: 2019-09-14

## 2019-09-10 RX ORDER — AMLODIPINE BESYLATE 2.5 MG/1
1 TABLET ORAL
Qty: 0 | Refills: 0 | DISCHARGE
Start: 2019-09-10

## 2019-09-10 RX ORDER — RISPERIDONE 4 MG/1
1 TABLET ORAL
Qty: 0 | Refills: 0 | DISCHARGE

## 2019-09-10 RX ADMIN — VALACYCLOVIR 1000 MILLIGRAM(S): 500 TABLET, FILM COATED ORAL at 05:23

## 2019-09-10 RX ADMIN — VALACYCLOVIR 1000 MILLIGRAM(S): 500 TABLET, FILM COATED ORAL at 14:00

## 2019-09-10 RX ADMIN — MAGNESIUM OXIDE 400 MG ORAL TABLET 400 MILLIGRAM(S): 241.3 TABLET ORAL at 14:00

## 2019-09-10 RX ADMIN — NEOMYCIN SULFATE, POLYMYXIN B SULFATE AND HYDROCORTISONE 1 DROP(S): 3.5; 10000; 1 SUSPENSION OPHTHALMIC at 22:24

## 2019-09-10 RX ADMIN — NEOMYCIN SULFATE, POLYMYXIN B SULFATE AND HYDROCORTISONE 1 DROP(S): 3.5; 10000; 1 SUSPENSION OPHTHALMIC at 05:23

## 2019-09-10 RX ADMIN — MAGNESIUM OXIDE 400 MG ORAL TABLET 400 MILLIGRAM(S): 241.3 TABLET ORAL at 07:56

## 2019-09-10 RX ADMIN — Medication 650 MILLIGRAM(S): at 08:50

## 2019-09-10 RX ADMIN — Medication 650 MILLIGRAM(S): at 22:24

## 2019-09-10 RX ADMIN — VALACYCLOVIR 1000 MILLIGRAM(S): 500 TABLET, FILM COATED ORAL at 22:24

## 2019-09-10 RX ADMIN — Medication 650 MILLIGRAM(S): at 14:45

## 2019-09-10 RX ADMIN — Medication 650 MILLIGRAM(S): at 07:56

## 2019-09-10 RX ADMIN — NEOMYCIN SULFATE, POLYMYXIN B SULFATE AND HYDROCORTISONE 1 DROP(S): 3.5; 10000; 1 SUSPENSION OPHTHALMIC at 14:00

## 2019-09-10 RX ADMIN — Medication 650 MILLIGRAM(S): at 23:21

## 2019-09-10 RX ADMIN — Medication 650 MILLIGRAM(S): at 14:00

## 2019-09-10 RX ADMIN — Medication 650 MILLIGRAM(S): at 00:30

## 2019-09-10 RX ADMIN — AMLODIPINE BESYLATE 5 MILLIGRAM(S): 2.5 TABLET ORAL at 05:23

## 2019-09-10 RX ADMIN — RISPERIDONE 0.25 MILLIGRAM(S): 4 TABLET ORAL at 05:23

## 2019-09-10 NOTE — DISCHARGE NOTE PROVIDER - NSDCCPCAREPLAN_GEN_ALL_CORE_FT
PRINCIPAL DISCHARGE DIAGNOSIS  Diagnosis: Generalized weakness  Assessment and Plan of Treatment: You were admitted with generalized weakness likely due to shingles and electrolytes disturbances.  You report feeling better at this time and back to your baseline.  -Please maintain adequate nutrition and hydration.  -Please follow up with your primary doctor.      SECONDARY DISCHARGE DIAGNOSES  Diagnosis: Conjunctivitis of right eye  Assessment and Plan of Treatment: You were seen by infectious disease doctor for your right eye reddness and infection.  You were treated with eye ointment with improvement.    Diagnosis: Hyponatremia  Assessment and Plan of Treatment: You were noted with low sodium levels in your blood likely due to poor nutrition, which may cause confusion, headaches, nausea and poor balance.  You were supplemented with sodium with intravenous fluids and your sodium levels improved, now normal.  -Please maintain balanced nutrition  -Please follow up withyour primary doctor with repeat blood work.      Diagnosis: Schizophrenia  Assessment and Plan of Treatment: As reported by your family member you were on Risperadal for psych treatment.  You were seen and evaluated by psychiatrist Dr. Lubin and were started on Risperadal.  -Please take medication as prescribed.  -Please follow up with outpatient psychiatrist.    Diagnosis: Shingles  Assessment and Plan of Treatment: You were admitetted with right eye and right forehead rash and rash.  You were found to have shingles and were treated with valacyclovir and eye ointment with improved viral symptoms.  -Please complete treatment as prescribed.  -Please follow up with your primary physician. PRINCIPAL DISCHARGE DIAGNOSIS  Diagnosis: Shingles  Assessment and Plan of Treatment: You were admitetted with right eye and right forehead rash and rash.  You were found to have shingles and were treated with valacyclovir and eye ointment with improved viral symptoms.  -Please complete treatment as prescribed.  -Please follow up with your primary physician.      SECONDARY DISCHARGE DIAGNOSES  Diagnosis: Conjunctivitis of right eye  Assessment and Plan of Treatment: You were seen by infectious disease doctor for your right eye reddness and infection.  You were treated with eye ointment with improvement.    Diagnosis: Hyponatremia  Assessment and Plan of Treatment: You were noted with low sodium levels in your blood likely due to poor nutrition, which may cause confusion, headaches, nausea and poor balance.  You were supplemented with sodium with intravenous fluids and your sodium levels improved, now normal.  -Please maintain balanced nutrition  -Please follow up withyour primary doctor with repeat blood work.      Diagnosis: Generalized weakness  Assessment and Plan of Treatment: You presented with generalized weakness likely due to electrolytes disturbances due to poor nutrition and viral burdon.  Your electrolytes were monitored closely and replaced as needed.  You were seen and evaluated by physical therapist and were recommended to go to rehab for a short term.  -Please ollow up exercises at rehab.  -Please maintain adequate balanced nutrition    Diagnosis: Schizophrenia  Assessment and Plan of Treatment: As reported by your family member you were on Risperadal for psych treatment.  You were seen and evaluated by psychiatrist Dr. Lubin and were started on Risperadal.  -Please take medication as prescribed.  -Please follow up with outpatient psychiatrist.    Diagnosis: Shingles  Assessment and Plan of Treatment: You were admitetted with right eye and right forehead rash and rash.  You were found to have shingles and were treated with valacyclovir and eye ointment with improved viral symptoms.  -Please complete treatment as prescribed.  -Please follow up with your primary physician. PRINCIPAL DISCHARGE DIAGNOSIS  Diagnosis: Shingles  Assessment and Plan of Treatment: You were admitetted with right eye and right forehead rash and rash.  You were found to have shingles and were treated with valacyclovir and eye ointment with improved viral symptoms.  -Please complete treatment as prescribed.  -Please follow up with your primary physician.      SECONDARY DISCHARGE DIAGNOSES  Diagnosis: Generalized weakness  Assessment and Plan of Treatment: You presented with generalized weakness likely due to electrolytes disturbances due to poor nutrition and viral burdon.  Your electrolytes were monitored closely and replaced as needed.  You were seen and evaluated by physical therapist and were recommended to go to rehab for a short term.  -Please ollow up exercises at rehab.  -Please maintain adequate balanced nutrition    Diagnosis: Conjunctivitis of right eye  Assessment and Plan of Treatment: You were seen by infectious disease doctor for your right eye reddness and infection.  You were treated with eye ointment with improvement.    Diagnosis: Hyponatremia  Assessment and Plan of Treatment: You were noted with low sodium levels in your blood likely due to poor nutrition, which may cause confusion, headaches, nausea and poor balance.  You were supplemented with sodium with intravenous fluids and your sodium levels improved, now normal.  -Please maintain balanced nutrition  -Please follow up withyour primary doctor with repeat blood work.      Diagnosis: Schizophrenia  Assessment and Plan of Treatment: As reported by your family member you were on Risperadal for psych treatment.  You were seen and evaluated by psychiatrist Dr. Lubin and were started on Risperadal.  -Please take medication as prescribed.  -Please follow up with outpatient psychiatrist.    Diagnosis: Shingles  Assessment and Plan of Treatment: You were admitetted with right eye and right forehead rash and rash.  You were found to have shingles and were treated with valacyclovir and eye ointment with improved viral symptoms.  -Please complete treatment as prescribed.  -Please follow up with your primary physician.

## 2019-09-10 NOTE — DISCHARGE NOTE PROVIDER - PROVIDER TOKENS
FREE:[LAST:[Rehab Primary Physician],PHONE:[(   )    -],FAX:[(   )    -],ADDRESS:[Rehab Primary Physician],FOLLOWUP:[1-3 days]]

## 2019-09-10 NOTE — PHYSICAL THERAPY INITIAL EVALUATION ADULT - CRITERIA FOR SKILLED THERAPEUTIC INTERVENTIONS
impairments found/predicted duration of therapy intervention/therapy frequency/anticipated discharge recommendation/rehab potential

## 2019-09-10 NOTE — PROGRESS NOTE ADULT - SUBJECTIVE AND OBJECTIVE BOX
INTERVAL HPI/OVERNIGHT EVENTS:  I feel better.  Vital Signs Last 24 Hrs  T(C): 36.7 (10 Sep 2019 15:46), Max: 36.7 (10 Sep 2019 15:46)  T(F): 98.1 (10 Sep 2019 15:46), Max: 98.1 (10 Sep 2019 15:46)  HR: 77 (10 Sep 2019 15:46) (58 - 77)  BP: 169/55 (10 Sep 2019 15:46) (142/56 - 169/55)  BP(mean): --  RR: 18 (10 Sep 2019 15:46) (16 - 18)  SpO2: 99% (10 Sep 2019 15:46) (98% - 100%)  I&O's Summary    10 Sep 2019 07:01  -  10 Sep 2019 17:06  --------------------------------------------------------  IN: 0 mL / OUT: 100 mL / NET: -100 mL      MEDICATIONS  (STANDING):  amLODIPine   Tablet 5 milliGRAM(s) Oral daily  enoxaparin Injectable 40 milliGRAM(s) SubCutaneous daily  hydrocortisone/polymyxin/neomycin Suspension (CORTISPORIN) 1 Drop(s) Right EYE three times a day  risperiDONE   Tablet 0.25 milliGRAM(s) Oral every 12 hours  valACYclovir 1000 milliGRAM(s) Oral three times a day    MEDICATIONS  (PRN):  acetaminophen   Tablet .. 650 milliGRAM(s) Oral every 6 hours PRN Mild Pain (1 - 3)    LABS:                        11.8   4.94  )-----------( 187      ( 10 Sep 2019 05:34 )             36.5     09-10    134<L>  |  102  |  16  ----------------------------<  146<H>  4.5   |  29  |  0.62    Ca    8.2<L>      10 Sep 2019 05:34  Mg     2.2     09-10          CAPILLARY BLOOD GLUCOSE              REVIEW OF SYSTEMS:  CONSTITUTIONAL: No fever, weight loss, or fatigue  EYES: No eye pain, visual disturbances, or discharge  ENMT:  No difficulty hearing, tinnitus, vertigo; No sinus or throat pain  NECK: No pain or stiffness  RESPIRATORY: No cough, wheezing, chills or hemoptysis; No shortness of breath  CARDIOVASCULAR: No chest pain, palpitations, dizziness, or leg swelling  GASTROINTESTINAL: No abdominal or epigastric pain. No nausea, vomiting, or hematemesis; No diarrhea or constipation. No melena or hematochezia.  GENITOURINARY: No dysuria, frequency, hematuria, or incontinence  NEUROLOGICAL: No headaches, memory loss, loss of strength, numbness, or tremors    Consultant(s) Notes Reviewed:  [x ] YES  [ ] NO    PHYSICAL EXAM:  GENERAL: NAD, well-groomed, well-developed,not in any distress ,  HEAD:  Atraumatic, Normocephalic  EYES: EOMI, PERRLA, conjunctiva and sclera clear  ENMT: No tonsillar erythema, exudates, or enlargement; Moist mucous membranes, Good dentition, No lesions  NECK: Supple, No JVD, Normal thyroid  NERVOUS SYSTEM:  Alert & Oriented X3, No focal deficit   CHEST/LUNG: Good air entry bilateral with no  rales, rhonchi, wheezing, or rubs  HEART: Regular rate and rhythm; No murmurs, rubs, or gallops  ABDOMEN: Soft, Nontender, Nondistended; Bowel sounds present  EXTREMITIES:  2+ Peripheral Pulses, No clubbing, cyanosis, or edema  SKIN: crusted lesions rt scalp and face     Care Discussed with Consultants/Other Providers [ x] YES  [ ] NO

## 2019-09-10 NOTE — DISCHARGE NOTE PROVIDER - CARE PROVIDER_API CALL
Rehab Primary Physician,   Rehab Primary Physician  Phone: (   )    -  Fax: (   )    -  Follow Up Time: 1-3 days

## 2019-09-10 NOTE — PROGRESS NOTE ADULT - PROBLEM SELECTOR PLAN 1
Erythema and scaly lesion at V1 dermatome, around right eye  Continue valacyclovir.  Continue Cortisporin  ID, Dr. Matos following.

## 2019-09-10 NOTE — PROGRESS NOTE ADULT - SUBJECTIVE AND OBJECTIVE BOX
NP Note discussed with Primary Attending    73 y/o F with PMH of schizophrenia who presented with generalized weakness with difficultly ambulating. Patient noted to have right upper forehead rash and right eye redness. Patient was admitted for Herpes Zoster, seen by infectious disease, receiving Valacyclovir. Hyponatremia is improving.    INTERVAL HPI/OVERNIGHT EVENTS: Awake, alert, responsive to verbal commands, lying in bed, in no acute distress.    MEDICATIONS  (STANDING):  amLODIPine   Tablet 5 milliGRAM(s) Oral daily  enoxaparin Injectable 40 milliGRAM(s) SubCutaneous daily  hydrocortisone/polymyxin/neomycin Suspension (CORTISPORIN) 1 Drop(s) Right EYE three times a day  risperiDONE   Tablet 0.25 milliGRAM(s) Oral every 12 hours  valACYclovir 1000 milliGRAM(s) Oral three times a day    MEDICATIONS  (PRN):  acetaminophen   Tablet .. 650 milliGRAM(s) Oral every 6 hours PRN Mild Pain (1 - 3)      __________________________________________________  REVIEW OF SYSTEMS:    CONSTITUTIONAL: No fever, no chills  EYES: no acute visual disturbances  NECK: No pain or stiffness  RESPIRATORY: No cough; No shortness of breath  CARDIOVASCULAR: No chest pain, no palpitations  GASTROINTESTINAL: No pain. No nausea or vomiting; No diarrhea   NEUROLOGICAL: No headache or numbness, no tremors  MUSCULOSKELETAL: No joint pain, no muscle pain  GENITOURINARY: no dysuria, no frequency, no hesitancy  PSYCHIATRY: h/o schizophrenia      Vital Signs Last 24 Hrs  T(C): 36.7 (10 Sep 2019 15:46), Max: 36.7 (10 Sep 2019 15:46)  T(F): 98.1 (10 Sep 2019 15:46), Max: 98.1 (10 Sep 2019 15:46)  HR: 77 (10 Sep 2019 15:46) (58 - 77)  BP: 169/55 (10 Sep 2019 15:46) (142/56 - 169/55)  BP(mean): --  RR: 18 (10 Sep 2019 15:46) (16 - 18)  SpO2: 99% (10 Sep 2019 15:46) (98% - 100%)    ________________________________________________  PHYSICAL EXAM:  GENERAL: NAD  HEENT: Normocephalic; right eye redness, left conjunctivae and sclerae clear; moist mucous membranes   NECK: supple, no jvd  CHEST/LUNG: Clear to auscultation bilaterally with good air entry   HEART: S1 S2 regular; no murmurs, gallops or rubs  ABDOMEN: Soft, nontender, nondistended; bowel sounds present  EXTREMITIES: no cyanosis; no edema; no calf tenderness  SKIN: warm and dry; rash to right forehead  NERVOUS SYSTEM:  Awake and alert; oriented to place, person and time; no new deficits    _________________________________________________  LABS:                        11.8   4.94  )-----------( 187      ( 10 Sep 2019 05:34 )             36.5     09-10    134<L>  |  102  |  16  ----------------------------<  146<H>  4.5   |  29  |  0.62    Ca    8.2<L>      10 Sep 2019 05:34  Mg     2.2     09-10          CAPILLARY BLOOD GLUCOSE        RADIOLOGY & ADDITIONAL TESTS:        Plan of care was discussed with patient and /or primary care giver; all questions and concerns were addressed and care was aligned with patient's wishes.

## 2019-09-10 NOTE — PROGRESS NOTE ADULT - ASSESSMENT
Problem/Plan - 1:  ·  Problem: Generalized weakness.  Plan: Unknown cause .  Pt presented with generalized weakness  CT head did not show acute infarct or hemorrhage  Resolved.    Problem/Plan - 2:  ·  Problem: Herpes zoster ophthalmicus, right eye.  Plan: Resolving. Pt has erythema and scaly lesion at V1 dermatome, around right eye  Continue valacyclovir.   ID helping .   Problem/Plan - 3:  ·  Problem: Hyponatremia/ Hypokalemia .  Plan: Correcting.     Problem/Plan - 4:  ·  Problem: Schizophrenia.  Plan: Psychiatry follow up.   Continue home medications.      Problem/Plan - 5:  ·  Problem: Prophylactic measure.  Plan:  Lovenox for DVT prophylaxis.     Disposition : DC planning pending placement as Medically stable.

## 2019-09-10 NOTE — PROGRESS NOTE ADULT - PROBLEM SELECTOR PLAN 2
Pt presented with generalized weakness  CT head did not show acute infarct or hemorrhage  PT recommends DAVID and patient agreed

## 2019-09-11 LAB
ANION GAP SERPL CALC-SCNC: 4 MMOL/L — LOW (ref 5–17)
BUN SERPL-MCNC: 14 MG/DL — SIGNIFICANT CHANGE UP (ref 7–18)
CALCIUM SERPL-MCNC: 8.3 MG/DL — LOW (ref 8.4–10.5)
CHLORIDE SERPL-SCNC: 99 MMOL/L — SIGNIFICANT CHANGE UP (ref 96–108)
CO2 SERPL-SCNC: 31 MMOL/L — SIGNIFICANT CHANGE UP (ref 22–31)
CREAT SERPL-MCNC: 0.67 MG/DL — SIGNIFICANT CHANGE UP (ref 0.5–1.3)
GLUCOSE SERPL-MCNC: 143 MG/DL — HIGH (ref 70–99)
HCT VFR BLD CALC: 37.1 % — SIGNIFICANT CHANGE UP (ref 34.5–45)
HGB BLD-MCNC: 12.1 G/DL — SIGNIFICANT CHANGE UP (ref 11.5–15.5)
MCHC RBC-ENTMCNC: 29.4 PG — SIGNIFICANT CHANGE UP (ref 27–34)
MCHC RBC-ENTMCNC: 32.6 GM/DL — SIGNIFICANT CHANGE UP (ref 32–36)
MCV RBC AUTO: 90.3 FL — SIGNIFICANT CHANGE UP (ref 80–100)
NRBC # BLD: 0 /100 WBCS — SIGNIFICANT CHANGE UP (ref 0–0)
PLATELET # BLD AUTO: 211 K/UL — SIGNIFICANT CHANGE UP (ref 150–400)
POTASSIUM SERPL-MCNC: 4 MMOL/L — SIGNIFICANT CHANGE UP (ref 3.5–5.3)
POTASSIUM SERPL-SCNC: 4 MMOL/L — SIGNIFICANT CHANGE UP (ref 3.5–5.3)
RBC # BLD: 4.11 M/UL — SIGNIFICANT CHANGE UP (ref 3.8–5.2)
RBC # FLD: 12.8 % — SIGNIFICANT CHANGE UP (ref 10.3–14.5)
SODIUM SERPL-SCNC: 134 MMOL/L — LOW (ref 135–145)
WBC # BLD: 5.85 K/UL — SIGNIFICANT CHANGE UP (ref 3.8–10.5)
WBC # FLD AUTO: 5.85 K/UL — SIGNIFICANT CHANGE UP (ref 3.8–10.5)

## 2019-09-11 RX ADMIN — NEOMYCIN SULFATE, POLYMYXIN B SULFATE AND HYDROCORTISONE 1 DROP(S): 3.5; 10000; 1 SUSPENSION OPHTHALMIC at 13:01

## 2019-09-11 RX ADMIN — NEOMYCIN SULFATE, POLYMYXIN B SULFATE AND HYDROCORTISONE 1 DROP(S): 3.5; 10000; 1 SUSPENSION OPHTHALMIC at 05:53

## 2019-09-11 RX ADMIN — ENOXAPARIN SODIUM 40 MILLIGRAM(S): 100 INJECTION SUBCUTANEOUS at 13:01

## 2019-09-11 RX ADMIN — NEOMYCIN SULFATE, POLYMYXIN B SULFATE AND HYDROCORTISONE 1 DROP(S): 3.5; 10000; 1 SUSPENSION OPHTHALMIC at 22:36

## 2019-09-11 RX ADMIN — Medication 650 MILLIGRAM(S): at 09:10

## 2019-09-11 RX ADMIN — VALACYCLOVIR 1000 MILLIGRAM(S): 500 TABLET, FILM COATED ORAL at 05:52

## 2019-09-11 RX ADMIN — Medication 650 MILLIGRAM(S): at 17:27

## 2019-09-11 RX ADMIN — RISPERIDONE 0.25 MILLIGRAM(S): 4 TABLET ORAL at 05:52

## 2019-09-11 RX ADMIN — AMLODIPINE BESYLATE 5 MILLIGRAM(S): 2.5 TABLET ORAL at 05:53

## 2019-09-11 RX ADMIN — Medication 650 MILLIGRAM(S): at 05:52

## 2019-09-11 RX ADMIN — VALACYCLOVIR 1000 MILLIGRAM(S): 500 TABLET, FILM COATED ORAL at 13:01

## 2019-09-11 RX ADMIN — VALACYCLOVIR 1000 MILLIGRAM(S): 500 TABLET, FILM COATED ORAL at 22:36

## 2019-09-11 NOTE — PROGRESS NOTE ADULT - SUBJECTIVE AND OBJECTIVE BOX
INTERVAL HPI/OVERNIGHT EVENTS: I want to go home.   Vital Signs Last 24 Hrs  T(C): 36.4 (11 Sep 2019 15:49), Max: 36.4 (11 Sep 2019 00:10)  T(F): 97.6 (11 Sep 2019 15:49), Max: 97.6 (11 Sep 2019 00:10)  HR: 95 (11 Sep 2019 15:49) (67 - 95)  BP: 168/68 (11 Sep 2019 15:49) (125/47 - 168/68)  BP(mean): --  RR: 18 (11 Sep 2019 15:49) (18 - 18)  SpO2: 100% (11 Sep 2019 15:49) (97% - 100%)  I&O's Summary    10 Sep 2019 07:01  -  11 Sep 2019 07:00  --------------------------------------------------------  IN: 0 mL / OUT: 100 mL / NET: -100 mL      MEDICATIONS  (STANDING):  amLODIPine   Tablet 5 milliGRAM(s) Oral daily  enoxaparin Injectable 40 milliGRAM(s) SubCutaneous daily  hydrocortisone/polymyxin/neomycin Suspension (CORTISPORIN) 1 Drop(s) Right EYE three times a day  risperiDONE   Tablet 0.25 milliGRAM(s) Oral every 12 hours  valACYclovir 1000 milliGRAM(s) Oral three times a day    MEDICATIONS  (PRN):  acetaminophen   Tablet .. 650 milliGRAM(s) Oral every 6 hours PRN Mild Pain (1 - 3)    LABS:                        12.1   5.85  )-----------( 211      ( 11 Sep 2019 05:52 )             37.1     09-11    134<L>  |  99  |  14  ----------------------------<  143<H>  4.0   |  31  |  0.67    Ca    8.3<L>      11 Sep 2019 05:52  Mg     2.2     09-10          CAPILLARY BLOOD GLUCOSE              REVIEW OF SYSTEMS:  CONSTITUTIONAL: No fever, weight loss, or fatigue  EYES: No eye pain, visual disturbances, or discharge  ENMT:  No difficulty hearing, tinnitus, vertigo; No sinus or throat pain  RESPIRATORY: No cough, wheezing, chills or hemoptysis; No shortness of breath  CARDIOVASCULAR: No chest pain, palpitations, dizziness, or leg swelling  GASTROINTESTINAL: No abdominal or epigastric pain. No nausea, vomiting, or hematemesis; No diarrhea or constipation. No melena or hematochezia.  GENITOURINARY: No dysuria, frequency, hematuria, or incontinence  NEUROLOGICAL: No headaches, memory loss, loss of strength, numbness, or tremors    Consultant(s) Notes Reviewed:  [x ] YES  [ ] NO    PHYSICAL EXAM:  GENERAL: NAD, well-groomed, well-developed,not in any distress ,  HEAD:  Atraumatic, Normocephalic  EYES: EOMI, PERRLA, conjunctiva and sclera clear  ENMT: No tonsillar erythema, exudates, or enlargement; Moist mucous membranes, Good dentition, No lesions  NECK: Supple, No JVD, Normal thyroid  NERVOUS SYSTEM:  Alert & Oriented X3, No focal deficit   CHEST/LUNG: Good air entry bilateral with no  rales, rhonchi, wheezing, or rubs  HEART: Regular rate and rhythm; No murmurs, rubs, or gallops  ABDOMEN: Soft, Nontender, Nondistended; Bowel sounds present  EXTREMITIES:  2+ Peripheral Pulses, No clubbing, cyanosis, or edema  SKIN: Crusted  lesions +    Care Discussed with Consultants/Other Providers [ x] YES  [ ] NO

## 2019-09-11 NOTE — PROGRESS NOTE ADULT - ASSESSMENT
Problem/Plan - 1:  ·  Problem: Generalized weakness.  Plan: Unknown cause .  Pt presented with generalized weakness  CT head did not show acute infarct or hemorrhage  Resolved.    Problem/Plan - 2:  ·  Problem: Herpes zoster ophthalmicus, right eye.  Plan: Resolving. Pt has erythema and scaly lesion at V1 dermatome, around right eye  Continue valacyclovir.   ID helping .   Problem/Plan - 3:  ·  Problem: Hyponatremia/ Hypokalemia .  Plan: Corrected.      Problem/Plan - 4:  ·  Problem: Schizophrenia.  Plan: Psychiatry follow up.   Continue home medications.      Problem/Plan - 5:  ·  Problem: Prophylactic measure.  Plan:  Lovenox for DVT prophylaxis.     Disposition : DC planning pending placement as Medically stable.    No more labs.

## 2019-09-11 NOTE — PROGRESS NOTE ADULT - PROBLEM SELECTOR PLAN 2
Pt presented with generalized weakness  CT head did not show acute infarct or hemorrhage  PT recommends DAVID and patient agreed.

## 2019-09-11 NOTE — PROGRESS NOTE ADULT - ASSESSMENT
Herpes Zoster of forehead - dramatically better  Right eye conjunctivitis - improving     Plan - Herpes Zoster of forehead - dramatically better  Right eye conjunctivitis - improving     Plan - cont valtrex 1gm po tid for 2 days more  cont cortisporin eyedrops tid till tomorrow evening  DC planning  reconsult prn

## 2019-09-11 NOTE — PROGRESS NOTE ADULT - RS GEN PE MLT RESP DETAILS PC
clear to auscultation bilaterally/no rales/no rhonchi/no wheezes/good air movement/breath sounds equal

## 2019-09-11 NOTE — PROGRESS NOTE ADULT - SUBJECTIVE AND OBJECTIVE BOX
NP Note discussed with Primary Attending    73 y/o F with PMH of schizophrenia who presented with generalized weakness with difficultly ambulating. Patient noted to have right upper forehead rash and right eye redness. Patient was admitted for Herpes Zoster, seen by infectious disease, receiving Valacyclovir. Hyponatremia is improving.    INTERVAL HPI/OVERNIGHT EVENTS: Awake, alert, responsive to verbal commands, lying in bed, in no acute distress.    MEDICATIONS  (STANDING):  amLODIPine   Tablet 5 milliGRAM(s) Oral daily  enoxaparin Injectable 40 milliGRAM(s) SubCutaneous daily  hydrocortisone/polymyxin/neomycin Suspension (CORTISPORIN) 1 Drop(s) Right EYE three times a day  risperiDONE   Tablet 0.25 milliGRAM(s) Oral every 12 hours  valACYclovir 1000 milliGRAM(s) Oral three times a day    MEDICATIONS  (PRN):  acetaminophen   Tablet .. 650 milliGRAM(s) Oral every 6 hours PRN Mild Pain (1 - 3)      __________________________________________________  REVIEW OF SYSTEMS:    CONSTITUTIONAL: No fever, no chills  EYES: no acute visual disturbances  NECK: No pain or stiffness  RESPIRATORY: No cough; No shortness of breath  CARDIOVASCULAR: No chest pain, no palpitations  GASTROINTESTINAL: No pain. No nausea or vomiting; No diarrhea   NEUROLOGICAL: No headache or numbness, no tremors  MUSCULOSKELETAL: No joint pain, no muscle pain  GENITOURINARY: no dysuria, no frequency, no hesitancy  PSYCHIATRY: h/o schizophrenia      Vital Signs Last 24 Hrs  T(C): 36.3 (11 Sep 2019 07:57), Max: 36.7 (10 Sep 2019 15:46)  T(F): 97.4 (11 Sep 2019 07:57), Max: 98.1 (10 Sep 2019 15:46)  HR: 67 (11 Sep 2019 07:57) (67 - 77)  BP: 162/63 (11 Sep 2019 07:57) (125/47 - 169/55)  BP(mean): --  RR: 18 (11 Sep 2019 07:57) (18 - 18)  SpO2: 97% (11 Sep 2019 07:57) (97% - 99%)    ________________________________________________  PHYSICAL EXAM:  GENERAL: NAD  HEENT: Normocephalic; right eye redness, left conjunctivae and sclerae clear; moist mucous membranes   NECK: supple, no jvd  CHEST/LUNG: Clear to auscultation bilaterally with good air entry   HEART: S1 S2 regular; no murmurs, gallops or rubs  ABDOMEN: Soft, nontender, nondistended; bowel sounds present  EXTREMITIES: no cyanosis; no edema; no calf tenderness  SKIN: warm and dry; rash to right forehead  NERVOUS SYSTEM:  Awake and alert; oriented to place, person and time; no new deficits    _________________________________________________  LABS:                        12.1   5.85  )-----------( 211      ( 11 Sep 2019 05:52 )             37.1     09-11    134<L>  |  99  |  14  ----------------------------<  143<H>  4.0   |  31  |  0.67    Ca    8.3<L>      11 Sep 2019 05:52  Mg     2.2     09-10          CAPILLARY BLOOD GLUCOSE        RADIOLOGY & ADDITIONAL TESTS:         Plan of care was discussed with patient and /or primary care giver; all questions and concerns were addressed and care was aligned with patient's wishes.

## 2019-09-11 NOTE — PROGRESS NOTE ADULT - SUBJECTIVE AND OBJECTIVE BOX
74y Female    Meds:  valACYclovir 1000 milliGRAM(s) Oral three times a day    Allergies    Allergy Status Unknown    Intolerances        VITALS:  Vital Signs Last 24 Hrs  T(C): 36.3 (11 Sep 2019 07:57), Max: 36.4 (11 Sep 2019 00:10)  T(F): 97.4 (11 Sep 2019 07:57), Max: 97.6 (11 Sep 2019 00:10)  HR: 67 (11 Sep 2019 07:57) (67 - 71)  BP: 162/63 (11 Sep 2019 07:57) (125/47 - 162/63)  BP(mean): --  RR: 18 (11 Sep 2019 07:57) (18 - 18)  SpO2: 97% (11 Sep 2019 07:57) (97% - 99%)    LABS/DIAGNOSTIC TESTS:                          12.1   5.85  )-----------( 211      ( 11 Sep 2019 05:52 )             37.1         09-11    134<L>  |  99  |  14  ----------------------------<  143<H>  4.0   |  31  |  0.67    Ca    8.3<L>      11 Sep 2019 05:52  Mg     2.2     09-10            CULTURES: .Urine  09-07 @ 17:58   >=3 organisms. Probable collection contamination.  --  --            RADIOLOGY:      ROS:  [  ] UNABLE TO ELICIT 74y Female who is doing well, she is completely coherent today, she is feeling well and has no complaints at all , her right forehead and facial rash is much better , she has no fevers , chills or diarrhea. She denies any other complaints at this time.     Meds:  valACYclovir 1000 milliGRAM(s) Oral three times a day    Allergies    Allergy Status Unknown    Intolerances        VITALS:  Vital Signs Last 24 Hrs  T(C): 36.3 (11 Sep 2019 07:57), Max: 36.4 (11 Sep 2019 00:10)  T(F): 97.4 (11 Sep 2019 07:57), Max: 97.6 (11 Sep 2019 00:10)  HR: 67 (11 Sep 2019 07:57) (67 - 71)  BP: 162/63 (11 Sep 2019 07:57) (125/47 - 162/63)  BP(mean): --  RR: 18 (11 Sep 2019 07:57) (18 - 18)  SpO2: 97% (11 Sep 2019 07:57) (97% - 99%)    LABS/DIAGNOSTIC TESTS:                          12.1   5.85  )-----------( 211      ( 11 Sep 2019 05:52 )             37.1         09-11    134<L>  |  99  |  14  ----------------------------<  143<H>  4.0   |  31  |  0.67    Ca    8.3<L>      11 Sep 2019 05:52  Mg     2.2     09-10            CULTURES: .Urine  09-07 @ 17:58   >=3 organisms. Probable collection contamination.  --  --            RADIOLOGY:      ROS:  [  ] UNABLE TO ELICIT

## 2019-09-11 NOTE — PROGRESS NOTE ADULT - PROBLEM SELECTOR PLAN 1
Erythema and scaly lesion at V1 dermatome, around right eye-resolving  Continue valacyclovir day 5  Continue Cortisporin  ID, Dr. Matos following.

## 2019-09-12 RX ADMIN — NEOMYCIN SULFATE, POLYMYXIN B SULFATE AND HYDROCORTISONE 1 DROP(S): 3.5; 10000; 1 SUSPENSION OPHTHALMIC at 06:28

## 2019-09-12 RX ADMIN — RISPERIDONE 0.25 MILLIGRAM(S): 4 TABLET ORAL at 06:28

## 2019-09-12 RX ADMIN — NEOMYCIN SULFATE, POLYMYXIN B SULFATE AND HYDROCORTISONE 1 DROP(S): 3.5; 10000; 1 SUSPENSION OPHTHALMIC at 14:59

## 2019-09-12 RX ADMIN — VALACYCLOVIR 1000 MILLIGRAM(S): 500 TABLET, FILM COATED ORAL at 22:35

## 2019-09-12 RX ADMIN — VALACYCLOVIR 1000 MILLIGRAM(S): 500 TABLET, FILM COATED ORAL at 14:59

## 2019-09-12 RX ADMIN — ENOXAPARIN SODIUM 40 MILLIGRAM(S): 100 INJECTION SUBCUTANEOUS at 12:37

## 2019-09-12 RX ADMIN — AMLODIPINE BESYLATE 5 MILLIGRAM(S): 2.5 TABLET ORAL at 06:27

## 2019-09-12 RX ADMIN — VALACYCLOVIR 1000 MILLIGRAM(S): 500 TABLET, FILM COATED ORAL at 06:27

## 2019-09-12 NOTE — PROGRESS NOTE ADULT - ASSESSMENT
73 y/o F with PMH of schizophrenia who presented with generalized weakness since 2 days. According to the pt, since few days she has been feeling weak and has difficultly ambulating. She has right upper forehead and eye redness. She denied pain and itching.  Pt seems confused at  times. She denied fever, shortness of breath, chest pain, loss of consciousness and all other review of systems except mentioned above.     Problem/Plan - 1:  ·  Problem: Generalized weakness.  Plan: Unknown cause .  Pt presented with generalized weakness  CT head did not show acute infarct or hemorrhage  Resolved.    Problem/Plan - 2:  ·  Problem: Herpes zoster ophthalmicus, right eye.  Plan: Resolving. Pt has erythema and scaly lesion at V1 dermatome, around right eye  Continue valacyclovir.   ID helping .   Problem/Plan - 3:  ·  Problem: Hyponatremia/ Hypokalemia .  Plan: Corrected.      Problem/Plan - 4:  ·  Problem: Schizophrenia.  Plan: Psychiatry follow up.   Continue home medications.      Problem/Plan - 5:  ·  Problem: Prophylactic measure.  Plan:  Lovenox for DVT prophylaxis.     Disposition : DC planning pending placement as Medically stable.    No more labs.

## 2019-09-12 NOTE — DIETITIAN INITIAL EVALUATION ADULT. - PERTINENT LABORATORY DATA
09-11 Na134 mmol/L<L> Glu 143 mg/dL<H> K+ 4.0 mmol/L Cr  0.67 mg/dL BUN 14 mg/dL 09-07 Phos 3.0 mg/dL 09-06 Alb 3.9 g/dL 09-07 [id number] 6.3 %<H>

## 2019-09-12 NOTE — DIETITIAN INITIAL EVALUATION ADULT. - PERTINENT MEDS FT
MEDICATIONS  (STANDING):  amLODIPine   Tablet 5 milliGRAM(s) Oral daily  enoxaparin Injectable 40 milliGRAM(s) SubCutaneous daily  hydrocortisone/polymyxin/neomycin Suspension (CORTISPORIN) 1 Drop(s) Right EYE three times a day  risperiDONE   Tablet 0.25 milliGRAM(s) Oral every 12 hours  valACYclovir 1000 milliGRAM(s) Oral three times a day    MEDICATIONS  (PRN):  acetaminophen   Tablet .. 650 milliGRAM(s) Oral every 6 hours PRN Mild Pain (1 - 3)

## 2019-09-12 NOTE — DIETITIAN INITIAL EVALUATION ADULT. - ADD RECOMMEND
rec. consistent carbohydrate w/no snack, Pureed diet & add MVI/minerals daily as medically feasible rec. consistent carbohydrate w/no snack, Pureed diet & Add Glucerna Shake 1can bid (440kcal, 20g protein) add MVI/minerals daily as medically feasible

## 2019-09-12 NOTE — PROGRESS NOTE ADULT - PROBLEM SELECTOR PLAN 2
Erythema and scaly lesion at V1 dermatome, around right eye-resolving  Continue valacyclovir day 6, to be completed tomorrow  Continue Cortisporin until this evening  ID, Dr. Matos following.

## 2019-09-12 NOTE — DIETITIAN INITIAL EVALUATION ADULT. - OTHER INFO
Patient from home. Visited pt. alert but confused, poor historian, d/w PCA pt. with varied po intake, will eat well at certain meals depending on her medical condition, consuming 40-50%, encourage po intake with meal-set up, no reports of GI distress, awaiting placement & followed by , skin rash noted, d/w RN.

## 2019-09-12 NOTE — PROGRESS NOTE ADULT - PROBLEM SELECTOR PLAN 8
Patient will be discharged to [facility].  SW is following patient

## 2019-09-12 NOTE — PROGRESS NOTE ADULT - SUBJECTIVE AND OBJECTIVE BOX
INTERVAL HPI/OVERNIGHT EVENTS: I feel fine.   Vital Signs Last 24 Hrs  T(C): 36.7 (12 Sep 2019 08:15), Max: 36.7 (12 Sep 2019 00:36)  T(F): 98 (12 Sep 2019 08:15), Max: 98 (12 Sep 2019 00:36)  HR: 85 (12 Sep 2019 08:15) (78 - 95)  BP: 157/71 (12 Sep 2019 08:15) (155/66 - 168/68)  BP(mean): --  RR: 17 (12 Sep 2019 08:15) (16 - 18)  SpO2: 100% (12 Sep 2019 08:15) (100% - 100%)  I&O's Summary    MEDICATIONS  (STANDING):  amLODIPine   Tablet 5 milliGRAM(s) Oral daily  enoxaparin Injectable 40 milliGRAM(s) SubCutaneous daily  hydrocortisone/polymyxin/neomycin Suspension (CORTISPORIN) 1 Drop(s) Right EYE three times a day  risperiDONE   Tablet 0.25 milliGRAM(s) Oral every 12 hours  valACYclovir 1000 milliGRAM(s) Oral three times a day    MEDICATIONS  (PRN):  acetaminophen   Tablet .. 650 milliGRAM(s) Oral every 6 hours PRN Mild Pain (1 - 3)    LABS:                        12.1   5.85  )-----------( 211      ( 11 Sep 2019 05:52 )             37.1     09-11    134<L>  |  99  |  14  ----------------------------<  143<H>  4.0   |  31  |  0.67    Ca    8.3<L>      11 Sep 2019 05:52          CAPILLARY BLOOD GLUCOSE              REVIEW OF SYSTEMS:  CONSTITUTIONAL: No fever, weight loss, or fatigue  EYES: No eye pain, visual disturbances, or discharge  ENMT:  No difficulty hearing, tinnitus, vertigo; No sinus or throat pain  RESPIRATORY: No cough, wheezing, chills or hemoptysis; No shortness of breath  CARDIOVASCULAR: No chest pain, palpitations, dizziness, or leg swelling  GASTROINTESTINAL: No abdominal or epigastric pain. No nausea, vomiting, or hematemesis; No diarrhea or constipation. No melena or hematochezia.  GENITOURINARY: No dysuria, frequency, hematuria, or incontinence  NEUROLOGICAL: No headaches, memory loss, loss of strength, numbness, or tremors    Consultant(s) Notes Reviewed:  [x ] YES  [ ] NO    PHYSICAL EXAM:  GENERAL: NAD, well-groomed, well-developed,not in any distress ,  HEAD:  Atraumatic, Normocephalic  EYES: EOMI, PERRLA, conjunctiva and sclera clear  ENMT: No tonsillar erythema, exudates, or enlargement; Moist mucous membranes, Good dentition, No lesions  NECK: Supple, No JVD, Normal thyroid  NERVOUS SYSTEM:  Alert & Oriented X3, No focal deficit   CHEST/LUNG: Good air entry bilateral with no  rales, rhonchi, wheezing, or rubs  HEART: Regular rate and rhythm; No murmurs, rubs, or gallops  ABDOMEN: Soft, Nontender, Nondistended; Bowel sounds present  EXTREMITIES:  2+ Peripheral Pulses, No clubbing, cyanosis, or edema  SKIN: few crsuted lesiosn rt fore head     Care Discussed with Consultants/Other Providers [ x] YES  [ ] NO

## 2019-09-12 NOTE — DIETITIAN INITIAL EVALUATION ADULT. - PROBLEM SELECTOR PLAN 4
Pt takes risperidone at home, not sure about dosage  We will need to call pharmacy for medications. Family unaware of her home medications  Continue home medications

## 2019-09-12 NOTE — PROGRESS NOTE ADULT - SUBJECTIVE AND OBJECTIVE BOX
NP Note discussed with Primary Attending      75 y/o F with PMH of schizophrenia who presented with generalized weakness with difficultly ambulating. Patient noted to have right upper forehead rash and right eye redness. Patient was admitted for Herpes Zoster, rash has improved, seen by infectious disease, receiving Valacyclovir to be completed tomorrow and eyedrops for right conjunctivitis to be completed this evening. Hyponatremia is improving.      INTERVAL HPI/OVERNIGHT EVENTS: Awake, alert, responsive to verbal commands, lying in bed, in no acute distress.      MEDICATIONS  (STANDING):  amLODIPine   Tablet 5 milliGRAM(s) Oral daily  enoxaparin Injectable 40 milliGRAM(s) SubCutaneous daily  hydrocortisone/polymyxin/neomycin Suspension (CORTISPORIN) 1 Drop(s) Right EYE three times a day  risperiDONE   Tablet 0.25 milliGRAM(s) Oral every 12 hours  valACYclovir 1000 milliGRAM(s) Oral three times a day    MEDICATIONS  (PRN):  acetaminophen   Tablet .. 650 milliGRAM(s) Oral every 6 hours PRN Mild Pain (1 - 3)      __________________________________________________  REVIEW OF SYSTEMS:    CONSTITUTIONAL: No fever, no chills  EYES: no acute visual disturbances  NECK: No pain or stiffness  RESPIRATORY: No cough; No shortness of breath  CARDIOVASCULAR: No chest pain, no palpitations  GASTROINTESTINAL: No pain. No nausea or vomiting; No diarrhea   NEUROLOGICAL: No headache or numbness, no tremors  MUSCULOSKELETAL: No joint pain, no muscle pain  GENITOURINARY: no dysuria, no frequency, no hesitancy  PSYCHIATRY: h/o schizophrenia        Vital Signs Last 24 Hrs  T(C): 36.7 (12 Sep 2019 08:15), Max: 36.7 (12 Sep 2019 00:36)  T(F): 98 (12 Sep 2019 08:15), Max: 98 (12 Sep 2019 00:36)  HR: 85 (12 Sep 2019 08:15) (78 - 95)  BP: 157/71 (12 Sep 2019 08:15) (155/66 - 168/68)  BP(mean): --  RR: 17 (12 Sep 2019 08:15) (16 - 18)  SpO2: 100% (12 Sep 2019 08:15) (100% - 100%)    ________________________________________________  PHYSICAL EXAM:  GENERAL: NAD  HEENT: Normocephalic; right eye redness, left conjunctivae and sclerae clear; moist mucous membranes   NECK: supple, no jvd  CHEST/LUNG: Clear to auscultation bilaterally with good air entry   HEART: S1 S2 regular; no murmurs, gallops or rubs  ABDOMEN: Soft, nontender, nondistended; bowel sounds present  EXTREMITIES: no cyanosis; no edema; no calf tenderness  SKIN: warm and dry; rash to right forehead  NERVOUS SYSTEM:  Awake and alert; oriented to place, person and time; no new deficits  _________________________________________________  LABS:                        12.1   5.85  )-----------( 211      ( 11 Sep 2019 05:52 )             37.1     09-11    134<L>  |  99  |  14  ----------------------------<  143<H>  4.0   |  31  |  0.67    Ca    8.3<L>      11 Sep 2019 05:52          CAPILLARY BLOOD GLUCOSE            RADIOLOGY & ADDITIONAL TESTS:      Consultant(s) Notes Reviewed:   YES    Care Discussed with Consultants : Infectious Disease    Plan of care was discussed with patient and /or primary care giver; all questions and concerns were addressed and care was aligned with patient's wishes.

## 2019-09-13 DIAGNOSIS — F22 DELUSIONAL DISORDERS: ICD-10-CM

## 2019-09-13 DIAGNOSIS — K59.00 CONSTIPATION, UNSPECIFIED: ICD-10-CM

## 2019-09-13 DIAGNOSIS — Z04.6 ENCOUNTER FOR GENERAL PSYCHIATRIC EXAMINATION, REQUESTED BY AUTHORITY: ICD-10-CM

## 2019-09-13 PROCEDURE — 99223 1ST HOSP IP/OBS HIGH 75: CPT

## 2019-09-13 RX ORDER — AMLODIPINE BESYLATE 2.5 MG/1
10 TABLET ORAL DAILY
Refills: 0 | Status: DISCONTINUED | OUTPATIENT
Start: 2019-09-14 | End: 2019-09-19

## 2019-09-13 RX ORDER — SENNA PLUS 8.6 MG/1
2 TABLET ORAL AT BEDTIME
Refills: 0 | Status: DISCONTINUED | OUTPATIENT
Start: 2019-09-13 | End: 2019-09-19

## 2019-09-13 RX ORDER — AMLODIPINE BESYLATE 2.5 MG/1
5 TABLET ORAL ONCE
Refills: 0 | Status: COMPLETED | OUTPATIENT
Start: 2019-09-13 | End: 2019-09-13

## 2019-09-13 RX ORDER — POLYETHYLENE GLYCOL 3350 17 G/17G
17 POWDER, FOR SOLUTION ORAL
Refills: 0 | Status: DISCONTINUED | OUTPATIENT
Start: 2019-09-13 | End: 2019-09-19

## 2019-09-13 RX ADMIN — VALACYCLOVIR 1000 MILLIGRAM(S): 500 TABLET, FILM COATED ORAL at 07:04

## 2019-09-13 RX ADMIN — POLYETHYLENE GLYCOL 3350 17 GRAM(S): 17 POWDER, FOR SOLUTION ORAL at 12:03

## 2019-09-13 RX ADMIN — RISPERIDONE 0.25 MILLIGRAM(S): 4 TABLET ORAL at 07:04

## 2019-09-13 RX ADMIN — ENOXAPARIN SODIUM 40 MILLIGRAM(S): 100 INJECTION SUBCUTANEOUS at 12:03

## 2019-09-13 RX ADMIN — VALACYCLOVIR 1000 MILLIGRAM(S): 500 TABLET, FILM COATED ORAL at 13:52

## 2019-09-13 RX ADMIN — AMLODIPINE BESYLATE 5 MILLIGRAM(S): 2.5 TABLET ORAL at 07:04

## 2019-09-13 RX ADMIN — AMLODIPINE BESYLATE 5 MILLIGRAM(S): 2.5 TABLET ORAL at 14:32

## 2019-09-13 RX ADMIN — RISPERIDONE 0.25 MILLIGRAM(S): 4 TABLET ORAL at 17:21

## 2019-09-13 RX ADMIN — VALACYCLOVIR 1000 MILLIGRAM(S): 500 TABLET, FILM COATED ORAL at 21:51

## 2019-09-13 NOTE — PROGRESS NOTE ADULT - PROBLEM SELECTOR PLAN 1
Pt presented with generalized weakness  CT head did not show acute infarct or hemorrhage  PT recommends DAVID Pt presented with generalized weakness, now improved.   CT head did not show acute infarct or hemorrhage  PT recommends DAVID

## 2019-09-13 NOTE — PROGRESS NOTE ADULT - PROBLEM SELECTOR PLAN 5
Patient was seen by Psychiatry, Dr. Tristian Mathew.  Psychiatry follow up outpatient. No BM since admission per staff.  Pt without GI symptoms.  Add bowel regimen: Miralax BID til first BM, then change to prn  Senna at bedtime

## 2019-09-13 NOTE — PROGRESS NOTE ADULT - PROBLEM SELECTOR PLAN 2
Erythema and scaly lesion at V1 dermatome, around right eye-resolving  Continue valacyclovir day 6, to be completed tomorrow  Continue Cortisporin until this evening  ID, Dr. Matos following. Erythema and scaly lesion at V1 dermatome, around right eye-resolving  Valacyclovir day 7/7   s/p Cortisporin eye drops  ID, Dr. Matos following.

## 2019-09-13 NOTE — PROGRESS NOTE ADULT - PROBLEM SELECTOR PLAN 7
DVT ppx-continue Lovenox.
Patient was seen by Psychiatry, Dr. Tristian Mathew

## 2019-09-13 NOTE — PROGRESS NOTE BEHAVIORAL HEALTH - SUMMARY
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 Psych consult originally requested on 9/7 for evaluation, pt seen by Dr. Lubin who found no acute issues and signed off. New consult was requested today for capacity, as team has recommended DAVID and pt has reportedly been reluctant to agree. On exam, pt is pleasant, cooperative and appropriate in affect, and apart from what appear to be persistent mild paranoid delusions about unknown people trying to get her grandfather's money, she appears to have reasonably good basic reality testing and no overt s/s of psychosis. While pt very much values her independence and prefers to live on her own, she appears able to comprehend and process MD's advice that it would be coulter for her to agree to DAVID and home services on DC in order to enable her to continue living at home safely i/s/o increasing frailty. Pt now states that she agrees to DAVID and appears to have capacity to do so. Pt does not appear to present an acute risk of harm to self or others at the time of assessment, and does not appear to be in need of admission to IP psych at the time of assessment.

## 2019-09-13 NOTE — PROGRESS NOTE ADULT - PROBLEM SELECTOR PLAN 8
Patient will be discharged to [facility].  SW is following patient Patient will be discharged to [facility].  Level 2 approval pending.   SW is following patient

## 2019-09-13 NOTE — PROGRESS NOTE ADULT - SUBJECTIVE AND OBJECTIVE BOX
INTERVAL HPI/OVERNIGHT EVENTS: Nephew in room . Feel fine.   Vital Signs Last 24 Hrs  T(C): 36.7 (13 Sep 2019 15:52), Max: 36.7 (13 Sep 2019 00:26)  T(F): 98.1 (13 Sep 2019 15:52), Max: 98.1 (13 Sep 2019 15:52)  HR: 111 (13 Sep 2019 15:52) (62 - 111)  BP: 144/77 (13 Sep 2019 15:52) (144/77 - 173/59)  BP(mean): --  RR: 16 (13 Sep 2019 15:52) (16 - 16)  SpO2: 99% (13 Sep 2019 15:52) (97% - 99%)  I&O's Summary    MEDICATIONS  (STANDING):  enoxaparin Injectable 40 milliGRAM(s) SubCutaneous daily  polyethylene glycol 3350 17 Gram(s) Oral two times a day  risperiDONE   Tablet 0.25 milliGRAM(s) Oral every 12 hours  senna 2 Tablet(s) Oral at bedtime  valACYclovir 1000 milliGRAM(s) Oral three times a day    MEDICATIONS  (PRN):  acetaminophen   Tablet .. 650 milliGRAM(s) Oral every 6 hours PRN Mild Pain (1 - 3)    LABS:              CAPILLARY BLOOD GLUCOSE              REVIEW OF SYSTEMS:  CONSTITUTIONAL: No fever, weight loss, or fatigue  EYES: No eye pain, visual disturbances, or discharge  ENMT:  No difficulty hearing, tinnitus, vertigo; No sinus or throat pain  NECK: No pain or stiffness  RESPIRATORY: No cough, wheezing, chills or hemoptysis; No shortness of breath  CARDIOVASCULAR: No chest pain, palpitations, dizziness, or leg swelling  GASTROINTESTINAL: No abdominal or epigastric pain. No nausea, vomiting, or hematemesis; No diarrhea or constipation. No melena or hematochezia.  GENITOURINARY: No dysuria, frequency, hematuria, or incontinence  NEUROLOGICAL: No headaches, memory loss, loss of strength, numbness, or tremors    Consultant(s) Notes Reviewed:  [x ] YES  [ ] NO    PHYSICAL EXAM:  GENERAL: NAD, well-groomed, well-developed,not in any distress ,  HEAD:  Atraumatic, Normocephalic  EYES: EOMI, PERRLA, conjunctiva and sclera clear  ENMT: No tonsillar erythema, exudates, or enlargement; Moist mucous membranes, Good dentition, No lesions  NECK: Supple, No JVD, Normal thyroid  NERVOUS SYSTEM:  Alert & Oriented X3, No focal deficit   CHEST/LUNG: Good air entry bilateral with no  rales, rhonchi, wheezing, or rubs  HEART: Regular rate and rhythm; No murmurs, rubs, or gallops  ABDOMEN: Soft, Nontender, Nondistended; Bowel sounds present  EXTREMITIES:  2+ Peripheral Pulses, No clubbing, cyanosis, or edema  SKIN: Few crusted lesions    Care Discussed with Consultants/Other Providers [ x] YES  [ ] NO

## 2019-09-13 NOTE — PROGRESS NOTE ADULT - PROBLEM SELECTOR PROBLEM 8
Discharge planning issues
Prophylactic measure

## 2019-09-13 NOTE — PROGRESS NOTE BEHAVIORAL HEALTH - NSBHFUPINTERVALHXFT_PSY_A_CORE
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 Psych consult originally requested on 9/7 for evaluation, pt seen by Dr. Lubin who found no acute issues and signed off. New consult was requested today for capacity, as team has recommended DAVID and pt has reportedly been reluctant to agree. Pt seen in her room, calm and cooperative. 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 Pt proves to be fairly well oriented (September 2019, "close to 9-11," MACROS off by 1 day). When recounting her personal hx, pt demonstrates some mild paranoia (she believes that a group of "people in the virtual world" is pursuing her to try to get money they think she inherited from her grandfather), but her thinking otherwise appears to be linear. Pt describes mood as "fine" and denies SI/HI/AVH. Pt completes most of the MOCA and performs well in naming, digit span, subtraction, language and abstraction, but has trouble with visual tasks (she does not have her glasses) and delayed recall. Pt reports that she lives alone without home services, relying on her super for assistance from time to time, and is very attached to her independence. However, when MD tells her it would be much better if she were to agree to DAVID to ensure a safe recovery, and afterwards to agree to more help at home, pt agrees, saying, "I would rather not, but I'll go under duress."

## 2019-09-13 NOTE — PROGRESS NOTE BEHAVIORAL HEALTH - NSBHCONSULTRECOMMENDOTHER_PSY_A_CORE FT
1. Pt DOES appear to have capacity to consent to DAVID  --If pt again displays ambivalence about recommended care, recommend reasoning with her, as she appears open to education and persuasion  2. Pt has stated she does not want family involved in her care, but again recommend persuasion and education to gain her cooperation rather than imposing family involvement over her objection  3. Recommend offering home psychiatric medication (Risperdal 0.25 mg q12h), but pt has the right to refuse as she is not acutely psychotic at this time  4. Psychiatry is signing off. Reconsult if additional issues arise as inpatient  5. Case d/w Yaquelin Rojas NP of primary team    Idalia Iniguez MD  Director, Consultation-Liaison Psychiatry Service  [id number]

## 2019-09-13 NOTE — PROGRESS NOTE ADULT - SUBJECTIVE AND OBJECTIVE BOX
NP Note discussed with  Primary Attending    Patient is a 74y old  Female who presents with a chief complaint of Generalized weakness (12 Sep 2019 12:13)      INTERVAL HPI/OVERNIGHT EVENTS: no new complaints    MEDICATIONS  (STANDING):  amLODIPine   Tablet 5 milliGRAM(s) Oral daily  enoxaparin Injectable 40 milliGRAM(s) SubCutaneous daily  polyethylene glycol 3350 17 Gram(s) Oral two times a day  risperiDONE   Tablet 0.25 milliGRAM(s) Oral every 12 hours  senna 2 Tablet(s) Oral at bedtime  valACYclovir 1000 milliGRAM(s) Oral three times a day    MEDICATIONS  (PRN):  acetaminophen   Tablet .. 650 milliGRAM(s) Oral every 6 hours PRN Mild Pain (1 - 3)      __________________________________________________  REVIEW OF SYSTEMS:    CONSTITUTIONAL: No fever,   EYES: no acute visual disturbances  NECK: No pain or stiffness  RESPIRATORY: No cough; No shortness of breath  CARDIOVASCULAR: No chest pain, no palpitations  GASTROINTESTINAL: No pain. No nausea or vomiting; No diarrhea   NEUROLOGICAL: No headache or numbness, no tremors  MUSCULOSKELETAL: No joint pain, no muscle pain  GENITOURINARY: no dysuria, no frequency, no hesitancy  PSYCHIATRY: no depression , no anxiety  ALL OTHER  ROS negative        Vital Signs Last 24 Hrs  T(C): 36.7 (13 Sep 2019 08:21), Max: 36.7 (12 Sep 2019 15:53)  T(F): 98 (13 Sep 2019 08:21), Max: 98.1 (12 Sep 2019 15:53)  HR: 79 (13 Sep 2019 08:21) (62 - 82)  BP: 165/58 (13 Sep 2019 08:21) (150/61 - 173/59)  BP(mean): --  RR: 16 (13 Sep 2019 08:21) (16 - 17)  SpO2: 97% (13 Sep 2019 08:21) (97% - 100%)    ________________________________________________  PHYSICAL EXAM:  GENERAL: NAD  HEENT: Normocephalic;  conjunctivae and sclerae clear; moist mucous membranes;   NECK : supple  CHEST/LUNG: Clear to auscultation bilaterally with good air entry   HEART: S1 S2  regular; no murmurs, gallops or rubs  ABDOMEN: Soft, Nontender, Nondistended; Bowel sounds present  EXTREMITIES: no cyanosis; no edema; no calf tenderness  SKIN: warm and dry; no rash  NERVOUS SYSTEM:  Awake and alert; Oriented  to place, person and time ; no new deficits    _________________________________________________  LABS:              CAPILLARY BLOOD GLUCOSE            RADIOLOGY & ADDITIONAL TESTS:    Imaging Personally Reviewed:  YES/NO    Consultant(s) Notes Reviewed:   YES/ No    Care Discussed with Consultants :     Plan of care was discussed with patient and /or primary care giver; all questions and concerns were addressed and care was aligned with patient's wishes. NP Note discussed with  Primary Attending    Patient is a 74y old  Female who presents with a chief complaint of Generalized weakness (12 Sep 2019 12:13)  HPI  73 y/o F with PMH of schizophrenia who presented with generalized weakness with difficultly ambulating. Patient noted to have right upper forehead rash and right eye redness. Patient was admitted for Herpes Zoster, rash has improved, seen by infectious disease, receiving Valacyclovir to be completed tomorrow and eyedrops for right conjunctivitis to be completed this evening. Hyponatremia is improving.    INTERVAL HPI/OVERNIGHT EVENTS: no new complaints    MEDICATIONS  (STANDING):  amLODIPine   Tablet 5 milliGRAM(s) Oral daily  enoxaparin Injectable 40 milliGRAM(s) SubCutaneous daily  polyethylene glycol 3350 17 Gram(s) Oral two times a day  risperiDONE   Tablet 0.25 milliGRAM(s) Oral every 12 hours  senna 2 Tablet(s) Oral at bedtime  valACYclovir 1000 milliGRAM(s) Oral three times a day    MEDICATIONS  (PRN):  acetaminophen   Tablet .. 650 milliGRAM(s) Oral every 6 hours PRN Mild Pain (1 - 3)      __________________________________________________  REVIEW OF SYSTEMS:    CONSTITUTIONAL: No fever,   EYES: no acute visual disturbances  NECK: No pain or stiffness  RESPIRATORY: No cough; No shortness of breath  CARDIOVASCULAR: No chest pain, no palpitations  GASTROINTESTINAL: No pain. No nausea or vomiting; No diarrhea   NEUROLOGICAL: No headache or numbness, no tremors  MUSCULOSKELETAL: No joint pain, no muscle pain  GENITOURINARY: no dysuria, no frequency, no hesitancy  PSYCHIATRY: no depression , no anxiety  ALL OTHER  ROS negative        Vital Signs Last 24 Hrs  T(C): 36.7 (13 Sep 2019 08:21), Max: 36.7 (12 Sep 2019 15:53)  T(F): 98 (13 Sep 2019 08:21), Max: 98.1 (12 Sep 2019 15:53)  HR: 79 (13 Sep 2019 08:21) (62 - 82)  BP: 165/58 (13 Sep 2019 08:21) (150/61 - 173/59)  BP(mean): --  RR: 16 (13 Sep 2019 08:21) (16 - 17)  SpO2: 97% (13 Sep 2019 08:21) (97% - 100%)    ________________________________________________  PHYSICAL EXAM:  GENERAL: NAD  HEENT: Normocephalic;  conjunctivae and sclerae clear; moist mucous membranes;   NECK : supple  CHEST/LUNG: Clear to auscultation bilaterally with good air entry   HEART: S1 S2  regular; no murmurs, gallops or rubs  ABDOMEN: Soft, Nontender, Nondistended; Bowel sounds present  EXTREMITIES: no cyanosis; no edema; no calf tenderness  SKIN: warm and dry; no rash  NERVOUS SYSTEM:  Awake and alert; Oriented  to place, person and time ; no new deficits    _________________________________________________  LABS:              CAPILLARY BLOOD GLUCOSE            RADIOLOGY & ADDITIONAL TESTS:    Imaging Personally Reviewed:  YES/NO    Consultant(s) Notes Reviewed:   YES/ No    Care Discussed with Consultants :     Plan of care was discussed with patient and /or primary care giver; all questions and concerns were addressed and care was aligned with patient's wishes. NP Note discussed with  Primary Attending    Patient is a 74y old  Female who presents with a chief complaint of Generalized weakness (12 Sep 2019 12:13)    HPI  73 y/o F with PMH of schizophrenia who presented with generalized weakness with difficultly ambulating. Patient noted to have right upper forehead rash and right eye redness. Patient was admitted for Herpes Zoster, rash has improved, seen by infectious disease, receiving Valacyclovir, Day 7/7 today,  and eyedrops for right  completed yesterday.  Hyponatremia improved and is stable.     INTERVAL HPI/OVERNIGHT EVENTS: Pt awake, reports pain in right eye is better after Tylenol. Pt denies headache, SOB, chest discomfort, N/V/D, abdominal discomfort.    MEDICATIONS  (STANDING):  amLODIPine   Tablet 5 milliGRAM(s) Oral daily  enoxaparin Injectable 40 milliGRAM(s) SubCutaneous daily  polyethylene glycol 3350 17 Gram(s) Oral two times a day  risperiDONE   Tablet 0.25 milliGRAM(s) Oral every 12 hours  senna 2 Tablet(s) Oral at bedtime  valACYclovir 1000 milliGRAM(s) Oral three times a day    MEDICATIONS  (PRN):  acetaminophen   Tablet .. 650 milliGRAM(s) Oral every 6 hours PRN Mild Pain (1 - 3)      __________________________________________________  REVIEW OF SYSTEMS:    CONSTITUTIONAL: No fever,   EYES: no acute visual disturbances  NECK: No pain or stiffness  RESPIRATORY: No cough; No shortness of breath  CARDIOVASCULAR: No chest pain, no palpitations  GASTROINTESTINAL: No pain. No nausea or vomiting; No diarrhea   NEUROLOGICAL: No headache or numbness, no tremors  MUSCULOSKELETAL: No joint pain, no muscle pain  GENITOURINARY: no dysuria, no frequency, no hesitancy  PSYCHIATRY: no depression , no anxiety  ALL OTHER  ROS negative        Vital Signs Last 24 Hrs  T(C): 36.7 (13 Sep 2019 08:21), Max: 36.7 (12 Sep 2019 15:53)  T(F): 98 (13 Sep 2019 08:21), Max: 98.1 (12 Sep 2019 15:53)  HR: 79 (13 Sep 2019 08:21) (62 - 82)  BP: 165/58 (13 Sep 2019 08:21) (150/61 - 173/59)  BP(mean): --  RR: 16 (13 Sep 2019 08:21) (16 - 17)  SpO2: 97% (13 Sep 2019 08:21) (97% - 100%)    ________________________________________________  PHYSICAL EXAM:  GENERAL: NAD  HEENT: Normocephalic;  conjunctivae and sclerae clear; moist mucous membranes;   NECK : supple  CHEST/LUNG: Clear to auscultation bilaterally with good air entry   HEART: S1 S2  regular; no murmurs, gallops or rubs  ABDOMEN: Soft, Nontender, Nondistended; Bowel sounds present  EXTREMITIES: no cyanosis; no edema; no calf tenderness  SKIN: warm and dry; Rash to forehead dry, scabbed.  NERVOUS SYSTEM:  Awake and alert; Oriented  to place, person and time ; no new deficits    _________________________________________________  LABS:              CAPILLARY BLOOD GLUCOSE            RADIOLOGY & ADDITIONAL TESTS:    Imaging Personally Reviewed:  YES/NO    Consultant(s) Notes Reviewed:   YES/ No    Care Discussed with Consultants :     Plan of care was discussed with patient and /or primary care giver; all questions and concerns were addressed and care was aligned with patient's wishes.

## 2019-09-13 NOTE — PROGRESS NOTE ADULT - PROBLEM SELECTOR PLAN 6
Hgba1c 6.3  Consistent carbohydrate diet. Patient was seen by Psychiatry, Dr. Tristian Mathew.  Psychiatry follow up outpatient.

## 2019-09-13 NOTE — PROGRESS NOTE BEHAVIORAL HEALTH - NSBHCHARTREVIEWVS_PSY_A_CORE FT
Vital Signs Last 24 Hrs  T(C): 36.7 (13 Sep 2019 15:52), Max: 36.7 (13 Sep 2019 00:26)  T(F): 98.1 (13 Sep 2019 15:52), Max: 98.1 (13 Sep 2019 15:52)  HR: 111 (13 Sep 2019 15:52) (62 - 111)  BP: 144/77 (13 Sep 2019 15:52) (144/77 - 173/59)  BP(mean): --  RR: 16 (13 Sep 2019 15:52) (16 - 16)  SpO2: 99% (13 Sep 2019 15:52) (97% - 99%)

## 2019-09-14 RX ADMIN — VALACYCLOVIR 1000 MILLIGRAM(S): 500 TABLET, FILM COATED ORAL at 06:06

## 2019-09-14 RX ADMIN — AMLODIPINE BESYLATE 10 MILLIGRAM(S): 2.5 TABLET ORAL at 06:06

## 2019-09-14 RX ADMIN — VALACYCLOVIR 1000 MILLIGRAM(S): 500 TABLET, FILM COATED ORAL at 13:27

## 2019-09-14 RX ADMIN — VALACYCLOVIR 1000 MILLIGRAM(S): 500 TABLET, FILM COATED ORAL at 21:28

## 2019-09-14 RX ADMIN — RISPERIDONE 0.25 MILLIGRAM(S): 4 TABLET ORAL at 18:47

## 2019-09-14 RX ADMIN — Medication 650 MILLIGRAM(S): at 14:33

## 2019-09-14 RX ADMIN — RISPERIDONE 0.25 MILLIGRAM(S): 4 TABLET ORAL at 06:06

## 2019-09-14 RX ADMIN — SENNA PLUS 2 TABLET(S): 8.6 TABLET ORAL at 21:28

## 2019-09-14 RX ADMIN — Medication 650 MILLIGRAM(S): at 13:31

## 2019-09-14 NOTE — PROGRESS NOTE ADULT - ASSESSMENT
75 y/o F with PMH of schizophrenia who presented with generalized weakness since 2 days. According to the pt, since few days she has been feeling weak and has difficultly ambulating. She has right upper forehead and eye redness. She denied pain and itching.  Pt seems confused at  times. She denied fever, shortness of breath, chest pain, loss of consciousness and all other review of systems except mentioned above.     Problem/Plan - 1:  ·  Problem: Generalized weakness.  Plan: Unknown cause .  Pt presented with generalized weakness  CT head did not show acute infarct or hemorrhage  Resolved.    Problem/Plan - 2:  ·  Problem: Herpes zoster ophthalmicus, right eye.  Plan: Resolving. Pt has erythema and scaly lesion at V1 dermatome, around right eye  Continue valacyclovir.   ID helping .   Problem/Plan - 3:  ·  Problem: Hyponatremia/ Hypokalemia .  Plan: Corrected.      Problem/Plan - 4:  ·  Problem: Schizophrenia.  Plan: Psychiatry follow up.   Continue home medications.      Problem/Plan - 5:  ·  Problem: Prophylactic measure.  Plan:  Lovenox for DVT prophylaxis.     Disposition : DC planning pending placement as Medically stable.    No more labs.

## 2019-09-14 NOTE — PROGRESS NOTE ADULT - SUBJECTIVE AND OBJECTIVE BOX
INTERVAL HPI/OVERNIGHT EVENTS: i feel fine.   Vital Signs Last 24 Hrs  T(C): 36.6 (14 Sep 2019 15:27), Max: 36.7 (14 Sep 2019 07:36)  T(F): 97.9 (14 Sep 2019 15:27), Max: 98 (14 Sep 2019 07:36)  HR: 80 (14 Sep 2019 15:27) (80 - 89)  BP: 151/52 (14 Sep 2019 15:27) (138/54 - 151/52)  BP(mean): --  RR: 17 (14 Sep 2019 15:27) (16 - 17)  SpO2: 99% (14 Sep 2019 15:27) (98% - 100%)  I&O's Summary    MEDICATIONS  (STANDING):  amLODIPine   Tablet 10 milliGRAM(s) Oral daily  enoxaparin Injectable 40 milliGRAM(s) SubCutaneous daily  polyethylene glycol 3350 17 Gram(s) Oral two times a day  risperiDONE   Tablet 0.25 milliGRAM(s) Oral every 12 hours  senna 2 Tablet(s) Oral at bedtime  valACYclovir 1000 milliGRAM(s) Oral three times a day    MEDICATIONS  (PRN):  acetaminophen   Tablet .. 650 milliGRAM(s) Oral every 6 hours PRN Mild Pain (1 - 3)    LABS:              CAPILLARY BLOOD GLUCOSE              REVIEW OF SYSTEMS:  CONSTITUTIONAL: No fever, weight loss, or fatigue  EYES: No eye pain, visual disturbances, or discharge  ENMT:  No difficulty hearing, tinnitus, vertigo; No sinus or throat pain  NECK: No pain or stiffness  RESPIRATORY: No cough, wheezing, chills or hemoptysis; No shortness of breath  CARDIOVASCULAR: No chest pain, palpitations, dizziness, or leg swelling  GASTROINTESTINAL: No abdominal or epigastric pain. No nausea, vomiting, or hematemesis; No diarrhea or constipation. No melena or hematochezia.    Consultant(s) Notes Reviewed:  [x ] YES  [ ] NO    PHYSICAL EXAM:  GENERAL: NAD, well-groomed, well-developed,not in any distress ,  HEAD:  Atraumatic, Normocephalic  EYES: EOMI, PERRLA, conjunctiva and sclera clear  ENMT: No tonsillar erythema, exudates, or enlargement; Moist mucous membranes, Good dentition, No lesions  NECK: Supple, No JVD, Normal thyroid  NERVOUS SYSTEM:  Alert & Oriented X3, No focal deficit   CHEST/LUNG: Good air entry bilateral with no  rales, rhonchi, wheezing, or rubs  HEART: Regular rate and rhythm; No murmurs, rubs, or gallops  ABDOMEN: Soft, Nontender, Nondistended; Bowel sounds present  EXTREMITIES:  2+ Peripheral Pulses, No clubbing, cyanosis, or edema      Care Discussed with Consultants/Other Providers [ x] YES  [ ] NO

## 2019-09-15 RX ADMIN — RISPERIDONE 0.25 MILLIGRAM(S): 4 TABLET ORAL at 06:30

## 2019-09-15 RX ADMIN — AMLODIPINE BESYLATE 10 MILLIGRAM(S): 2.5 TABLET ORAL at 06:30

## 2019-09-15 RX ADMIN — VALACYCLOVIR 1000 MILLIGRAM(S): 500 TABLET, FILM COATED ORAL at 22:06

## 2019-09-15 RX ADMIN — POLYETHYLENE GLYCOL 3350 17 GRAM(S): 17 POWDER, FOR SOLUTION ORAL at 06:30

## 2019-09-15 RX ADMIN — VALACYCLOVIR 1000 MILLIGRAM(S): 500 TABLET, FILM COATED ORAL at 06:30

## 2019-09-15 RX ADMIN — ENOXAPARIN SODIUM 40 MILLIGRAM(S): 100 INJECTION SUBCUTANEOUS at 12:19

## 2019-09-15 RX ADMIN — VALACYCLOVIR 1000 MILLIGRAM(S): 500 TABLET, FILM COATED ORAL at 14:35

## 2019-09-15 NOTE — PROGRESS NOTE ADULT - SUBJECTIVE AND OBJECTIVE BOX
INTERVAL HPI/OVERNIGHT EVENTS: No new concerns.   Vital Signs Last 24 Hrs  T(C): 36.2 (15 Sep 2019 15:51), Max: 36.3 (15 Sep 2019 00:39)  T(F): 97.2 (15 Sep 2019 15:51), Max: 97.4 (15 Sep 2019 00:39)  HR: 74 (15 Sep 2019 15:51) (74 - 93)  BP: 135/53 (15 Sep 2019 15:51) (135/53 - 147/58)  BP(mean): --  RR: 17 (15 Sep 2019 15:51) (17 - 18)  SpO2: 98% (15 Sep 2019 15:51) (98% - 100%)  I&O's Summary    MEDICATIONS  (STANDING):  amLODIPine   Tablet 10 milliGRAM(s) Oral daily  enoxaparin Injectable 40 milliGRAM(s) SubCutaneous daily  polyethylene glycol 3350 17 Gram(s) Oral two times a day  risperiDONE   Tablet 0.25 milliGRAM(s) Oral every 12 hours  senna 2 Tablet(s) Oral at bedtime  valACYclovir 1000 milliGRAM(s) Oral three times a day    MEDICATIONS  (PRN):  acetaminophen   Tablet .. 650 milliGRAM(s) Oral every 6 hours PRN Mild Pain (1 - 3)    LABS:              CAPILLARY BLOOD GLUCOSE              REVIEW OF SYSTEMS:  CONSTITUTIONAL: No fever, weight loss, or fatigue  EYES: No eye pain, visual disturbances, or discharge  ENMT:  No difficulty hearing, tinnitus, vertigo; No sinus or throat pain  RESPIRATORY: No cough, wheezing, chills or hemoptysis; No shortness of breath  CARDIOVASCULAR: No chest pain, palpitations, dizziness, or leg swelling  GASTROINTESTINAL: No abdominal or epigastric pain. No nausea, vomiting, or hematemesis; No diarrhea or constipation. No melena or hematochezia.  GENITOURINARY: No dysuria, frequency, hematuria, or incontinence  NEUROLOGICAL: No headaches, memory loss, loss of strength, numbness, or tremors    Consultant(s) Notes Reviewed:  [x ] YES  [ ] NO    PHYSICAL EXAM:  GENERAL: NAD, well-groomed, well-developed,not in any distress ,  HEAD:  Atraumatic, Normocephalic  EYES: EOMI, PERRLA, conjunctiva and sclera clear  NECK: Supple, No JVD, Normal thyroid  NERVOUS SYSTEM:  Alert & Oriented X3, No focal deficit   CHEST/LUNG: Good air entry bilateral with no  rales, rhonchi, wheezing, or rubs  HEART: Regular rate and rhythm; No murmurs, rubs, or gallops  ABDOMEN: Soft, Nontender, Nondistended; Bowel sounds present  EXTREMITIES:  2+ Peripheral Pulses, No clubbing, cyanosis, or edema  SKIN: few crusted  lesions    Care Discussed with Consultants/Other Providers [ x] YES  [ ] NO

## 2019-09-16 RX ADMIN — RISPERIDONE 0.25 MILLIGRAM(S): 4 TABLET ORAL at 06:32

## 2019-09-16 RX ADMIN — VALACYCLOVIR 1000 MILLIGRAM(S): 500 TABLET, FILM COATED ORAL at 06:32

## 2019-09-16 RX ADMIN — Medication 650 MILLIGRAM(S): at 01:02

## 2019-09-16 RX ADMIN — Medication 650 MILLIGRAM(S): at 00:05

## 2019-09-16 RX ADMIN — AMLODIPINE BESYLATE 10 MILLIGRAM(S): 2.5 TABLET ORAL at 06:32

## 2019-09-16 RX ADMIN — RISPERIDONE 0.25 MILLIGRAM(S): 4 TABLET ORAL at 18:08

## 2019-09-16 RX ADMIN — VALACYCLOVIR 1000 MILLIGRAM(S): 500 TABLET, FILM COATED ORAL at 13:38

## 2019-09-16 NOTE — PROGRESS NOTE ADULT - PROBLEM SELECTOR PLAN 6
PT rec [facility] for rehab initially pt refused, had psych eval for capacity, pt now agrees to go to [facility] and she can sign on consent per psychiatry  Level 2 approval is pending per QUYEN and once it is cleared she is medically stable for d/c since last Friday

## 2019-09-16 NOTE — PROGRESS NOTE ADULT - SUBJECTIVE AND OBJECTIVE BOX
INTERVAL HPI/OVERNIGHT EVENTS: i feel fine and want to go  home.   Vital Signs Last 24 Hrs  T(C): 36.6 (16 Sep 2019 15:48), Max: 36.7 (16 Sep 2019 08:11)  T(F): 97.8 (16 Sep 2019 15:48), Max: 98 (16 Sep 2019 08:11)  HR: 93 (16 Sep 2019 15:48) (72 - 93)  BP: 147/63 (16 Sep 2019 15:48) (144/60 - 148/53)  BP(mean): --  RR: 16 (16 Sep 2019 15:48) (16 - 17)  SpO2: 100% (16 Sep 2019 15:48) (98% - 100%)  I&O's Summary    MEDICATIONS  (STANDING):  amLODIPine   Tablet 10 milliGRAM(s) Oral daily  enoxaparin Injectable 40 milliGRAM(s) SubCutaneous daily  polyethylene glycol 3350 17 Gram(s) Oral two times a day  risperiDONE   Tablet 0.25 milliGRAM(s) Oral every 12 hours  senna 2 Tablet(s) Oral at bedtime    MEDICATIONS  (PRN):  acetaminophen   Tablet .. 650 milliGRAM(s) Oral every 6 hours PRN Mild Pain (1 - 3)    LABS:              CAPILLARY BLOOD GLUCOSE              REVIEW OF SYSTEMS:  CONSTITUTIONAL: No fever, weight loss, or fatigue  EYES: No eye pain, visual disturbances, or discharge  RESPIRATORY: No cough, wheezing, chills or hemoptysis; No shortness of breath  CARDIOVASCULAR: No chest pain, palpitations, dizziness, or leg swelling  GASTROINTESTINAL: No abdominal or epigastric pain. No nausea, vomiting, or hematemesis; No diarrhea or constipation. No melena or hematochezia.  GENITOURINARY: No dysuria, frequency, hematuria, or incontinence  NEUROLOGICAL: No headaches, memory loss, loss of strength, numbness, or tremors    Consultant(s) Notes Reviewed:  [x ] YES  [ ] NO    PHYSICAL EXAM:  GENERAL: NAD, well-groomed, well-developed,not in any distress ,  HEAD:  Atraumatic, Normocephalic  EYES: EOMI, PERRLA, conjunctiva and sclera congestion   ENMT: No tonsillar erythema, exudates, or enlargement; Moist mucous membranes, Good dentition, No lesions  NECK: Supple, No JVD, Normal thyroid  NERVOUS SYSTEM:  Alert & Oriented X3, No focal deficit   CHEST/LUNG: Good air entry bilateral with no  rales, rhonchi, wheezing, or rubs  HEART: Regular rate and rhythm; No murmurs, rubs, or gallops  ABDOMEN: Soft, Nontender, Nondistended; Bowel sounds present  EXTREMITIES:  2+ Peripheral Pulses, No clubbing, cyanosis, or edema  SKIN: few crusted lesions rt side forehead     Care Discussed with Consultants/Other Providers [ x] YES  [ ] NO

## 2019-09-16 NOTE — PROGRESS NOTE ADULT - SUBJECTIVE AND OBJECTIVE BOX
NP Note discussed with  Primary Attending     Patient is a 74y old  Female who presents with a chief complaint of Generalized weakness (15 Sep 2019 21:05)    HPI : Pt is 75 y/o F with PMH of schizophrenia who presented with generalized weakness since 2 days. According to the pt, since few days she has been feeling weak and has difficultly ambulating. She has right upper forehead and eye redness. She denied pain and itching.  Pt seems confused at  times. She denied fever, shortness of breath, chest pain, loss of consciousness and all other review of systems except mentioned above. Admitted to medicine for generalized weakness.   Completed course for Valtrex and steroid eye drop for herpes zoster in Rt eye. continue pain meds for surrounding area pain. Seen and examined, no new herpes zoster found in the  body, VSS ,afebrile, calm and cooperative with exam. Pt was seen by psych last Friday for capacity evaluation since pt refused [facility] transfer as PT recommended. Per Dr. Iniguez, pt has capacity to sign on consent to go to [facility]. Started on risperdal and tolerating well.   Level 2 approval is pending per QUYEN. otherwise, pt is medically stable for discharge.      GOC : full code       INTERVAL HPI/OVERNIGHT EVENTS: no new complaints    MEDICATIONS  (STANDING):  amLODIPine   Tablet 10 milliGRAM(s) Oral daily  enoxaparin Injectable 40 milliGRAM(s) SubCutaneous daily  polyethylene glycol 3350 17 Gram(s) Oral two times a day  risperiDONE   Tablet 0.25 milliGRAM(s) Oral every 12 hours  senna 2 Tablet(s) Oral at bedtime  valACYclovir 1000 milliGRAM(s) Oral three times a day    MEDICATIONS  (PRN):  acetaminophen   Tablet .. 650 milliGRAM(s) Oral every 6 hours PRN Mild Pain (1 - 3)      __________________________________________________  REVIEW OF SYSTEMS:    CONSTITUTIONAL: No fever,   EYES: no acute visual disturbances  NECK: No pain or stiffness  RESPIRATORY: No cough; No shortness of breath  CARDIOVASCULAR: No chest pain, no palpitations  GASTROINTESTINAL: No pain. No nausea or vomiting; No diarrhea   NEUROLOGICAL: No headache or numbness, no tremors  MUSCULOSKELETAL: No joint pain, no muscle pain  GENITOURINARY: no dysuria, no frequency, no hesitancy  PSYCHIATRY: no depression , no anxiety  ALL OTHER  ROS negative        Vital Signs Last 24 Hrs  T(C): 36.6 (16 Sep 2019 15:48), Max: 36.7 (16 Sep 2019 08:11)  T(F): 97.8 (16 Sep 2019 15:48), Max: 98 (16 Sep 2019 08:11)  HR: 93 (16 Sep 2019 15:48) (72 - 93)  BP: 147/63 (16 Sep 2019 15:48) (144/60 - 148/53)  BP(mean): --  RR: 16 (16 Sep 2019 15:48) (16 - 17)  SpO2: 100% (16 Sep 2019 15:48) (98% - 100%)    ________________________________________________  PHYSICAL EXAM:  GENERAL: NAD  HEENT: Normocephalic;  conjunctivae and sclerae clear; moist mucous membranes;  Rt upper eye to forehead - dry scar from healing herpes zoster.   NECK : supple  CHEST/LUNG: Clear to auscultation bilaterally with good air entry   HEART: S1 S2  regular; no murmurs, gallops or rubs  ABDOMEN: Soft, Nontender, Nondistended; Bowel sounds present  EXTREMITIES: no cyanosis; no edema; no calf tenderness  SKIN: warm and dry; no rash  NERVOUS SYSTEM:  Awake and alert; Oriented  to place, person and time ; no new deficits    _________________________________________________  LABS:              CAPILLARY BLOOD GLUCOSE            RADIOLOGY & ADDITIONAL TESTS:    Imaging Personally Reviewed:  YES/NO    Consultant(s) Notes Reviewed:   YES/ No    Care Discussed with Consultants :     Plan of care was discussed with patient and /or primary care giver; all questions and concerns were addressed and care was aligned with patient's wishes.

## 2019-09-17 DIAGNOSIS — Z71.89 OTHER SPECIFIED COUNSELING: ICD-10-CM

## 2019-09-17 RX ADMIN — Medication 650 MILLIGRAM(S): at 23:00

## 2019-09-17 RX ADMIN — RISPERIDONE 0.25 MILLIGRAM(S): 4 TABLET ORAL at 17:12

## 2019-09-17 RX ADMIN — RISPERIDONE 0.25 MILLIGRAM(S): 4 TABLET ORAL at 05:40

## 2019-09-17 RX ADMIN — AMLODIPINE BESYLATE 10 MILLIGRAM(S): 2.5 TABLET ORAL at 05:40

## 2019-09-17 RX ADMIN — Medication 650 MILLIGRAM(S): at 22:18

## 2019-09-17 NOTE — PROGRESS NOTE ADULT - PROBLEM SELECTOR PLAN 2
PT rec- [facility] - will be d/grady to [facility] once cleared by  for level 2 approval.

## 2019-09-17 NOTE — PROGRESS NOTE ADULT - ASSESSMENT
75 y/o F with PMH of schizophrenia who presented with generalized weakness since 2 days. According to the pt, since few days she has been feeling weak and has difficultly ambulating. She has right upper forehead and eye redness. She denied pain and itching.  Pt seems confused at  times. She denied fever, shortness of breath, chest pain, loss of consciousness and all other review of systems except mentioned above.     Problem/Plan - 1:  ·  Problem: Generalized weakness.  Plan: Unknown cause .  Pt presented with generalized weakness  CT head did not show acute infarct or hemorrhage  Resolved.    Problem/Plan - 2:  ·  Problem: Herpes zoster ophthalmicus, right eye.  Plan: Resolving. Pt has erythema and scaly lesion at V1 dermatome, around right eye  Continue valacyclovir.   ID helping .   Problem/Plan - 3:  ·  Problem: Hyponatremia/ Hypokalemia .  Plan: Corrected.      Problem/Plan - 4:  ·  Problem: Schizophrenia.  Plan: Psychiatry helping.   Continue home medications.      Problem/Plan - 5:  ·  Problem: Prophylactic measure.  Plan:  Lovenox for DVT prophylaxis.     Disposition : DC planning pending placement as Medically stable.    No more labs.

## 2019-09-17 NOTE — PROGRESS NOTE ADULT - SUBJECTIVE AND OBJECTIVE BOX
NP Note discussed with  Primary Attending    Patient is a 74y old  Female who presents with a chief complaint of Generalized weakness (16 Sep 2019 20:38)  HPI : Pt is 75 y/o F with PMH of schizophrenia who presented with generalized weakness since 2 days. According to the pt, since few days she has been feeling weak and has difficultly ambulating. She has right upper forehead and eye redness. She denied pain and itching.  Pt seems confused at  times. She denied fever, shortness of breath, chest pain, loss of consciousness and all other review of systems except mentioned above. Admitted to medicine for generalized weakness.   Completed course for Valtrex and steroid eye drop for herpes zoster in Rt eye. continue pain meds for surrounding area pain. Seen and examined, no new herpes zoster found in the  body, VSS ,afebrile, calm and cooperative with exam. Pt was seen by psych last Friday for capacity evaluation since pt refused [facility] transfer as PT recommended. Per Dr. Iniguez, pt has capacity to sign on consent to go to [facility]. Started on risperdal and tolerating well.   Level 2 approval is pending per QUYEN. Otherwise, pt is medically stable for discharge.      GOC : full code     INTERVAL HPI/OVERNIGHT EVENTS: no new complaints    MEDICATIONS  (STANDING):  amLODIPine   Tablet 10 milliGRAM(s) Oral daily  enoxaparin Injectable 40 milliGRAM(s) SubCutaneous daily  polyethylene glycol 3350 17 Gram(s) Oral two times a day  risperiDONE   Tablet 0.25 milliGRAM(s) Oral every 12 hours  senna 2 Tablet(s) Oral at bedtime    MEDICATIONS  (PRN):  acetaminophen   Tablet .. 650 milliGRAM(s) Oral every 6 hours PRN Mild Pain (1 - 3)      __________________________________________________  REVIEW OF SYSTEMS:    CONSTITUTIONAL: No fever,   EYES: no acute visual disturbances  NECK: No pain or stiffness  RESPIRATORY: No cough; No shortness of breath  CARDIOVASCULAR: No chest pain, no palpitations  GASTROINTESTINAL: No pain. No nausea or vomiting; No diarrhea   NEUROLOGICAL: No headache or numbness, no tremors  MUSCULOSKELETAL: No joint pain, no muscle pain  GENITOURINARY: no dysuria, no frequency, no hesitancy  PSYCHIATRY: no depression , no anxiety  ALL OTHER  ROS negative        Vital Signs Last 24 Hrs  T(C): 36.4 (17 Sep 2019 08:34), Max: 36.6 (16 Sep 2019 15:48)  T(F): 97.6 (17 Sep 2019 08:34), Max: 97.8 (16 Sep 2019 15:48)  HR: 65 (17 Sep 2019 08:34) (65 - 93)  BP: 148/49 (17 Sep 2019 08:34) (144/59 - 152/50)  BP(mean): --  RR: 18 (17 Sep 2019 08:34) (16 - 18)  SpO2: 100% (17 Sep 2019 08:34) (100% - 100%)    ________________________________________________  PHYSICAL EXAM:  GENERAL: NAD  HEENT: Normocephalic;  conjunctivae and sclerae clear; moist mucous membranes;   NECK : supple  CHEST/LUNG: Clear to auscultation bilaterally with good air entry   HEART: S1 S2  regular; no murmurs, gallops or rubs  ABDOMEN: Soft, Nontender, Nondistended; Bowel sounds present  EXTREMITIES: no cyanosis; no edema; no calf tenderness  SKIN: warm and dry; no rash  NERVOUS SYSTEM:  Awake and alert; Oriented  to place, person and time ; no new deficits    _________________________________________________  LABS:              CAPILLARY BLOOD GLUCOSE            RADIOLOGY & ADDITIONAL TESTS:    Imaging Personally Reviewed:  YES/NO    Consultant(s) Notes Reviewed:   YES/ No    Care Discussed with Consultants :     Plan of care was discussed with patient and /or primary care giver; all questions and concerns were addressed and care was aligned with patient's wishes. NP Note discussed with  Primary Attending    Patient is a 74y old  Female who presents with a chief complaint of Generalized weakness (16 Sep 2019 20:38)  HPI : Pt is 75 y/o F with PMH of schizophrenia who presented with generalized weakness since 2 days. According to the pt, since few days she has been feeling weak and has difficultly ambulating. She has right upper forehead and eye redness. She denied pain and itching.  Pt seems confused at  times. She denied fever, shortness of breath, chest pain, loss of consciousness and all other review of systems except mentioned above. Admitted to medicine for generalized weakness.   Completed course for Valtrex and steroid eye drop for herpes zoster in Rt eye. continue pain meds for surrounding area pain. Seen and examined, no new herpes zoster found in the  body, VSS ,afebrile, calm and cooperative with exam. Pt was seen by psych last Friday for capacity evaluation since pt refused [facility] transfer as PT recommended. Per Dr. Iniguez, pt has capacity to sign on consent to go to [facility]. Started on risperdal and tolerating well.   Level 2 approval is pending per . Otherwise, pt is medically stable for discharge.    seen and examined pt in bed this morning, no c/o voiced, VSs, afebrile.   GOC : full code     INTERVAL HPI/OVERNIGHT EVENTS: no new complaints    MEDICATIONS  (STANDING):  amLODIPine   Tablet 10 milliGRAM(s) Oral daily  enoxaparin Injectable 40 milliGRAM(s) SubCutaneous daily  polyethylene glycol 3350 17 Gram(s) Oral two times a day  risperiDONE   Tablet 0.25 milliGRAM(s) Oral every 12 hours  senna 2 Tablet(s) Oral at bedtime    MEDICATIONS  (PRN):  acetaminophen   Tablet .. 650 milliGRAM(s) Oral every 6 hours PRN Mild Pain (1 - 3)      __________________________________________________  REVIEW OF SYSTEMS:    CONSTITUTIONAL: No fever,   EYES: no acute visual disturbances  NECK: No pain or stiffness  RESPIRATORY: No cough; No shortness of breath  CARDIOVASCULAR: No chest pain, no palpitations  GASTROINTESTINAL: No pain. No nausea or vomiting; No diarrhea   NEUROLOGICAL: No headache or numbness, no tremors  MUSCULOSKELETAL: No joint pain, no muscle pain  GENITOURINARY: no dysuria, no frequency, no hesitancy  PSYCHIATRY: no depression , no anxiety  ALL OTHER  ROS negative        Vital Signs Last 24 Hrs  T(C): 36.4 (17 Sep 2019 08:34), Max: 36.6 (16 Sep 2019 15:48)  T(F): 97.6 (17 Sep 2019 08:34), Max: 97.8 (16 Sep 2019 15:48)  HR: 65 (17 Sep 2019 08:34) (65 - 93)  BP: 148/49 (17 Sep 2019 08:34) (144/59 - 152/50)  BP(mean): --  RR: 18 (17 Sep 2019 08:34) (16 - 18)  SpO2: 100% (17 Sep 2019 08:34) (100% - 100%)    ________________________________________________  PHYSICAL EXAM:  GENERAL: NAD  HEENT: Normocephalic;  conjunctivae and sclerae clear; moist mucous membranes; Rt upper eye to forehead - dry scar from healing herpes zoster.   NECK : supple  CHEST/LUNG: Clear to auscultation bilaterally with good air entry   HEART: S1 S2  regular; no murmurs, gallops or rubs  ABDOMEN: Soft, Nontender, Nondistended; Bowel sounds present  EXTREMITIES: no cyanosis; no edema; no calf tenderness  SKIN: warm and dry; no rash  NERVOUS SYSTEM:  Awake and alert; Oriented  to place, person and time ; no new deficits    _________________________________________________  LABS:    no more labs pt is medically stable           CAPILLARY BLOOD GLUCOSE            RADIOLOGY & ADDITIONAL TESTS:    Imaging Personally Reviewed:  NO    Consultant(s) Notes Reviewed:   No     Care Discussed with Consultants :     Plan of care was discussed with patient and /or primary care giver; all questions and concerns were addressed and care was aligned with patient's wishes.

## 2019-09-17 NOTE — PROGRESS NOTE ADULT - SUBJECTIVE AND OBJECTIVE BOX
INTERVAL HPI/OVERNIGHT EVENTS: I feel fine.   Vital Signs Last 24 Hrs  T(C): 36.9 (17 Sep 2019 16:08), Max: 36.9 (17 Sep 2019 16:08)  T(F): 98.4 (17 Sep 2019 16:08), Max: 98.4 (17 Sep 2019 16:08)  HR: 74 (17 Sep 2019 16:08) (65 - 74)  BP: 124/46 (17 Sep 2019 16:08) (124/46 - 152/50)  BP(mean): --  RR: 18 (17 Sep 2019 16:08) (18 - 18)  SpO2: 97% (17 Sep 2019 16:08) (97% - 100%)  I&O's Summary    MEDICATIONS  (STANDING):  amLODIPine   Tablet 10 milliGRAM(s) Oral daily  enoxaparin Injectable 40 milliGRAM(s) SubCutaneous daily  polyethylene glycol 3350 17 Gram(s) Oral two times a day  risperiDONE   Tablet 0.25 milliGRAM(s) Oral every 12 hours  senna 2 Tablet(s) Oral at bedtime    MEDICATIONS  (PRN):  acetaminophen   Tablet .. 650 milliGRAM(s) Oral every 6 hours PRN Mild Pain (1 - 3)    LABS:              CAPILLARY BLOOD GLUCOSE              REVIEW OF SYSTEMS:  CONSTITUTIONAL: No fever, weight loss, or fatigue  EYES: No eye pain, visual disturbances, or discharge  ENMT:  No difficulty hearing, tinnitus, vertigo; No sinus or throat pain  RESPIRATORY: No cough, wheezing, chills or hemoptysis; No shortness of breath  CARDIOVASCULAR: No chest pain, palpitations, dizziness, or leg swelling  GASTROINTESTINAL: No abdominal or epigastric pain. No nausea, vomiting, or hematemesis; No diarrhea or constipation. No melena or hematochezia.  GENITOURINARY: No dysuria, frequency, hematuria, or incontinence  NEUROLOGICAL: No headaches, memory loss, loss of strength, numbness, or tremors    Consultant(s) Notes Reviewed:  [x ] YES  [ ] NO    PHYSICAL EXAM:  GENERAL: NAD, well-groomed, well-developed,not in any distress ,  HEAD:  Atraumatic, Normocephalic  EYES: EOMI, PERRLA, conjunctiva and sclera clear  ENMT: No tonsillar erythema, exudates, or enlargement; Moist mucous membranes, Good dentition, No lesions  NECK: Supple, No JVD, Normal thyroid  NERVOUS SYSTEM:  Alert & Oriented X3, No focal deficit   CHEST/LUNG: Good air entry bilateral with no  rales, rhonchi, wheezing, or rubs  HEART: Regular rate and rhythm; No murmurs, rubs, or gallops  ABDOMEN: Soft, Nontender, Nondistended; Bowel sounds present  EXTREMITIES:  2+ Peripheral Pulses, No clubbing, cyanosis, or edema    Care Discussed with Consultants/Other Providers [ x] YES  [ ] NO

## 2019-09-17 NOTE — PROGRESS NOTE ADULT - PROBLEM SELECTOR PLAN 5
PT rec [facility] for rehab initially pt refused, had psych eval for capacity, pt now agrees to go to [facility] and she can sign on consent per psychiatry  Level 2 approval is pending per SW and once it is cleared she is medically stable for d/c since last Friday.

## 2019-09-18 RX ORDER — NEOMYCIN SULFATE, POLYMYXIN B SULFATE AND HYDROCORTISONE 3.5; 10000; 1 MG/ML; [USP'U]/ML; MG/ML
1 SUSPENSION OPHTHALMIC
Refills: 0 | Status: DISCONTINUED | OUTPATIENT
Start: 2019-09-18 | End: 2019-09-19

## 2019-09-18 RX ADMIN — Medication 650 MILLIGRAM(S): at 13:17

## 2019-09-18 RX ADMIN — Medication 650 MILLIGRAM(S): at 07:40

## 2019-09-18 RX ADMIN — RISPERIDONE 0.25 MILLIGRAM(S): 4 TABLET ORAL at 18:09

## 2019-09-18 RX ADMIN — Medication 650 MILLIGRAM(S): at 23:22

## 2019-09-18 RX ADMIN — NEOMYCIN SULFATE, POLYMYXIN B SULFATE AND HYDROCORTISONE 1 DROP(S): 3.5; 10000; 1 SUSPENSION OPHTHALMIC at 18:09

## 2019-09-18 RX ADMIN — Medication 650 MILLIGRAM(S): at 13:47

## 2019-09-18 RX ADMIN — Medication 650 MILLIGRAM(S): at 21:46

## 2019-09-18 RX ADMIN — AMLODIPINE BESYLATE 10 MILLIGRAM(S): 2.5 TABLET ORAL at 07:03

## 2019-09-18 RX ADMIN — ENOXAPARIN SODIUM 40 MILLIGRAM(S): 100 INJECTION SUBCUTANEOUS at 11:37

## 2019-09-18 RX ADMIN — Medication 650 MILLIGRAM(S): at 07:02

## 2019-09-18 RX ADMIN — RISPERIDONE 0.25 MILLIGRAM(S): 4 TABLET ORAL at 07:03

## 2019-09-18 RX ADMIN — NEOMYCIN SULFATE, POLYMYXIN B SULFATE AND HYDROCORTISONE 1 DROP(S): 3.5; 10000; 1 SUSPENSION OPHTHALMIC at 23:21

## 2019-09-18 NOTE — PROGRESS NOTE ADULT - PROBLEM SELECTOR PLAN 5
PT rec [facility] for rehab initially pt refused, had psych eval for capacity, pt now agrees to go to [facility] and she can sign on consent per psychiatry  Level 2 approval is pending per SW and once it is cleared she is medically stable for d/c since last Friday.

## 2019-09-18 NOTE — PROGRESS NOTE ADULT - SUBJECTIVE AND OBJECTIVE BOX
INTERVAL HPI/OVERNIGHT EVENTS: I feel fine.   Vital Signs Last 24 Hrs  T(C): 36.8 (18 Sep 2019 08:03), Max: 36.9 (17 Sep 2019 16:08)  T(F): 98.2 (18 Sep 2019 08:03), Max: 98.4 (17 Sep 2019 16:08)  HR: 65 (18 Sep 2019 08:03) (59 - 74)  BP: 148/50 (18 Sep 2019 08:03) (124/46 - 148/50)  BP(mean): --  RR: 16 (18 Sep 2019 08:03) (16 - 18)  SpO2: 98% (18 Sep 2019 08:03) (97% - 100%)  I&O's Summary    MEDICATIONS  (STANDING):  amLODIPine   Tablet 10 milliGRAM(s) Oral daily  enoxaparin Injectable 40 milliGRAM(s) SubCutaneous daily  polyethylene glycol 3350 17 Gram(s) Oral two times a day  risperiDONE   Tablet 0.25 milliGRAM(s) Oral every 12 hours  senna 2 Tablet(s) Oral at bedtime    MEDICATIONS  (PRN):  acetaminophen   Tablet .. 650 milliGRAM(s) Oral every 6 hours PRN Mild Pain (1 - 3)    LABS:              CAPILLARY BLOOD GLUCOSE              REVIEW OF SYSTEMS:  CONSTITUTIONAL: No fever, weight loss, or fatigue  EYES: Rt eye discharge  ENMT:  No difficulty hearing, tinnitus, vertigo; No sinus or throat pain  NECK: No pain or stiffness  BREASTS: No pain, masses, or nipple discharge  RESPIRATORY: No cough, wheezing, chills or hemoptysis; No shortness of breath  CARDIOVASCULAR: No chest pain, palpitations, dizziness, or leg swelling  GASTROINTESTINAL: No abdominal or epigastric pain. No nausea, vomiting, or hematemesis; No diarrhea or constipation. No melena or hematochezia.  GENITOURINARY: No dysuria, frequency, hematuria, or incontinence  NEUROLOGICAL: No headaches, memory loss, loss of strength, numbness, or tremors    Consultant(s) Notes Reviewed:  [x ] YES  [ ] NO    PHYSICAL EXAM:  GENERAL: NAD, well-groomed, well-developed,not in any distress ,  HEAD:  Atraumatic, Normocephalic  EYES: EOMI, PERRLA, conjunctiva and sclera congestion and discharge   ENMT: No tonsillar erythema, exudates, or enlargement; Moist mucous membranes, Good dentition, No lesions  NECK: Supple, No JVD, Normal thyroid  NERVOUS SYSTEM:  Alert & Oriented X3, No focal deficit   CHEST/LUNG: Good air entry bilateral with no  rales, rhonchi, wheezing, or rubs  HEART: Regular rate and rhythm; No murmurs, rubs, or gallops  ABDOMEN: Soft, Nontender, Nondistended; Bowel sounds present  EXTREMITIES:  2+ Peripheral Pulses, No clubbing, cyanosis, or edema    Care Discussed with Consultants/Other Providers [ x] YES  [ ] NO

## 2019-09-18 NOTE — PROGRESS NOTE ADULT - PROBLEM SELECTOR PLAN 3
completed course   dry scar to forehead and upper Rt eye   continue pain meds. completed course   dry scar to forehead and upper Rt eye   continue pain meds.  f/u outpt. ophthalmologist

## 2019-09-18 NOTE — PROGRESS NOTE ADULT - ASSESSMENT
73 y/o F with PMH of schizophrenia who presented with generalized weakness and difficulty ambulating x 2 days.  Pt. presented with right upper forehead and eye redness not associated with pain or visual disturbances  Pt seems confused at  times. She denied fever, shortness of breath, chest pain, loss of consciousness and all other review of systems except mentioned above. Admitted to medicine for generalized weakness.   Completed course for Valtrex and steroid eye drop for herpes zoster in Rt eye. continue pain meds for surrounding area pain. Seen and examined, no new herpes zoster found in the  body, VSS ,afebrile, calm and cooperative with exam. Pt was seen by psych last Friday for capacity evaluation since pt refused [facility] transfer as PT recommended. Per Dr. Iniguez, pt has capacity to sign on consent to go to [facility]. Started on risperdal and tolerating well.   Level 2 approval is pending per QUYEN. Otherwise, pt is medically stable for discharge.    seen and examined pt in bed this morning, no c/o voiced, VSs, afebrile.   GOC : full code 75 y/o F with PMH of schizophrenia who presented with generalized weakness and difficulty ambulating x 2 days.  Pt. presented with right upper forehead and eye redness not associated with pain or visual disturbances  Pt seems confused at  times. Admitted to medicine for generalized weakness, found with Shingles-Zoster Ophthalmicus.  Pt. completed course for Valtrex and steroid eye drop for herpes zoster in Rt eye with almost complete resolution of lesions and discomfort.  Pt. with persisting generalized weakness and social issues, needs [facility] placement which  continue pain meds for surrounding area pain. Seen and examined, no new herpes zoster found in the  body, VSS ,afebrile, calm and cooperative with exam. Pt was seen by psych last Friday for capacity evaluation since pt refused [facility] transfer as PT recommended. Per Dr. Iniguez, pt has capacity to sign on consent to go to [facility]. Started on risperdal and tolerating well.   Level 2 approval is pending per . Otherwise, pt is medically stable for discharge.    seen and examined pt in bed this morning, no c/o voiced, VSs, afebrile.   GOC : full code 73 y/o F with PMH of schizophrenia who presented with generalized weakness and difficulty ambulating x 2 days.  Pt. presented with right upper forehead and eye redness not associated with pain or visual disturbances  Pt seems confused at  times. Admitted to medicine for generalized weakness, found with Shingles-Zoster Ophthalmicus.  Pt. completed course for Valtrex and steroid eye drop for herpes zoster in Rt eye with almost complete resolution of lesions and discomfort.  Pt. with persisting generalized weakness and social issues, needs [facility] placement per PT evaluation which she initially refused to.  Pt was seen by psych last Friday for capacity evaluation since pt refused [facility] transfer as PT recommended. Per Dr. Iniguez, pt has capacity to sign on consent to go to [facility]. Started on risperdal and tolerating well.   Level 2 approval is pending per . Otherwise, pt is medically stable for discharge.    seen and examined pt in bed this morning, no c/o voiced, VSs, afebrile.   GOC : full code 75 y/o F with PMH of schizophrenia who presented with generalized weakness and difficulty ambulating x 2 days.  Pt. presented with right upper forehead and eye redness not associated with pain or visual disturbances  Pt seems confused at  times. Admitted to medicine for generalized weakness, found with Shingles-Zoster Ophthalmicus.  Pt. completed course for Valtrex and steroid eye drop for herpes zoster in Rt eye with almost complete resolution of lesions and discomfort.  Pt. with persisting generalized weakness and social issues, needs [facility] placement per PT evaluation which she initially refused to.  Pt was seen by psych last Friday for capacity evaluation determined with capacity to sign on consent to go to [facility]. Started on risperdal and tolerating well.   Level 2 approval is pending per . Otherwise, pt is medically stable for discharge.    seen and examined pt in bed this morning, no c/o voiced, VSs, afebrile.   GOC : full code

## 2019-09-18 NOTE — PROGRESS NOTE ADULT - SUBJECTIVE AND OBJECTIVE BOX
NP Note discussed with  Primary Attending    Patient is a 74y old  Female who presents with a chief complaint of Generalized weakness (17 Sep 2019 17:47)      INTERVAL HPI/OVERNIGHT EVENTS: no new complaints    MEDICATIONS  (STANDING):  amLODIPine   Tablet 10 milliGRAM(s) Oral daily  enoxaparin Injectable 40 milliGRAM(s) SubCutaneous daily  polyethylene glycol 3350 17 Gram(s) Oral two times a day  risperiDONE   Tablet 0.25 milliGRAM(s) Oral every 12 hours  senna 2 Tablet(s) Oral at bedtime    MEDICATIONS  (PRN):  acetaminophen   Tablet .. 650 milliGRAM(s) Oral every 6 hours PRN Mild Pain (1 - 3)      __________________________________________________  REVIEW OF SYSTEMS:    CONSTITUTIONAL: No fever,   EYES: no acute visual disturbances  NECK: No pain or stiffness  RESPIRATORY: No cough; No shortness of breath  CARDIOVASCULAR: No chest pain, no palpitations  GASTROINTESTINAL: No pain. No nausea or vomiting; No diarrhea   NEUROLOGICAL: No headache or numbness, no tremors  MUSCULOSKELETAL: No joint pain, no muscle pain  GENITOURINARY: no dysuria, no frequency, no hesitancy  PSYCHIATRY: no depression , no anxiety  ALL OTHER  ROS negative        Vital Signs Last 24 Hrs  T(C): 36.8 (18 Sep 2019 08:03), Max: 36.9 (17 Sep 2019 16:08)  T(F): 98.2 (18 Sep 2019 08:03), Max: 98.4 (17 Sep 2019 16:08)  HR: 65 (18 Sep 2019 08:03) (59 - 74)  BP: 148/50 (18 Sep 2019 08:03) (124/46 - 148/50)  BP(mean): --  RR: 16 (18 Sep 2019 08:03) (16 - 18)  SpO2: 98% (18 Sep 2019 08:03) (97% - 100%)    ________________________________________________  PHYSICAL EXAM:  GENERAL: NAD  HEENT: Normocephalic;  conjunctivae and sclerae clear; moist mucous membranes;   NECK : supple  CHEST/LUNG: Clear to auscultation bilaterally with good air entry   HEART: S1 S2  regular; no murmurs, gallops or rubs  ABDOMEN: Soft, Nontender, Nondistended; Bowel sounds present  EXTREMITIES: no cyanosis; no edema; no calf tenderness  SKIN: warm and dry; no rash  NERVOUS SYSTEM:  Awake and alert; Oriented  to place, person and time ; no new deficits    _________________________________________________  LABS:              CAPILLARY BLOOD GLUCOSE            RADIOLOGY & ADDITIONAL TESTS:    Imaging Personally Reviewed:  YES/NO    Consultant(s) Notes Reviewed:   YES/ No    Care Discussed with Consultants :     Plan of care was discussed with patient and /or primary care giver; all questions and concerns were addressed and care was aligned with patient's wishes. NP Note discussed with  Primary Attending    Patient is a 74y old  Female who presents with a chief complaint of Generalized weakness (17 Sep 2019 17:47)      INTERVAL HPI/OVERNIGHT EVENTS: no new complaints    MEDICATIONS  (STANDING):  amLODIPine   Tablet 10 milliGRAM(s) Oral daily  enoxaparin Injectable 40 milliGRAM(s) SubCutaneous daily  polyethylene glycol 3350 17 Gram(s) Oral two times a day  risperiDONE   Tablet 0.25 milliGRAM(s) Oral every 12 hours  senna 2 Tablet(s) Oral at bedtime    MEDICATIONS  (PRN):  acetaminophen   Tablet .. 650 milliGRAM(s) Oral every 6 hours PRN Mild Pain (1 - 3)      __________________________________________________  REVIEW OF SYSTEMS:    CONSTITUTIONAL: No fever,   EYES: no acute visual disturbances  NECK: No pain or stiffness  RESPIRATORY: No cough; No shortness of breath  CARDIOVASCULAR: No chest pain, no palpitations  GASTROINTESTINAL: No pain. No nausea or vomiting; No diarrhea   NEUROLOGICAL: No headache or numbness, no tremors  MUSCULOSKELETAL: No joint pain, no muscle pain  GENITOURINARY: no dysuria, no frequency, no hesitancy  PSYCHIATRY: no depression , no anxiety  ALL OTHER  ROS negative        Vital Signs Last 24 Hrs  T(C): 36.8 (18 Sep 2019 08:03), Max: 36.9 (17 Sep 2019 16:08)  T(F): 98.2 (18 Sep 2019 08:03), Max: 98.4 (17 Sep 2019 16:08)  HR: 65 (18 Sep 2019 08:03) (59 - 74)  BP: 148/50 (18 Sep 2019 08:03) (124/46 - 148/50)  BP(mean): --  RR: 16 (18 Sep 2019 08:03) (16 - 18)  SpO2: 98% (18 Sep 2019 08:03) (97% - 100%)    ________________________________________________  PHYSICAL EXAM: well nourished, well groomed  GENERAL: NAD  HEENT: right forehead redness, right eye partially open, Normocephalic;  conjunctivae and sclerae clear; moist mucous membranes;   NECK : supple  CHEST/LUNG: Clear to auscultation bilaterally with good air entry   HEART: S1 S2  regular; no murmurs, gallops or rubs  ABDOMEN: Soft, Nontender, Nondistended; Bowel sounds present  EXTREMITIES: no cyanosis; no edema; no calf tenderness  SKIN: warm and dry; no rash  NERVOUS SYSTEM:  Awake, oriented to self and place    _________________________________________________  LABS:              CAPILLARY BLOOD GLUCOSE            RADIOLOGY & ADDITIONAL TESTS:    Imaging Personally Reviewed:  YES/NO    Consultant(s) Notes Reviewed:   YES/ No    Care Discussed with Consultants :     Plan of care was discussed with patient and /or primary care giver; all questions and concerns were addressed and care was aligned with patient's wishes.

## 2019-09-18 NOTE — PROGRESS NOTE ADULT - ASSESSMENT
75 y/o F with PMH of schizophrenia who presented with generalized weakness since 2 days. According to the pt, since few days she has been feeling weak and has difficultly ambulating. She has right upper forehead and eye redness. She denied pain and itching.  Pt seems confused at  times. She denied fever, shortness of breath, chest pain, loss of consciousness and all other review of systems except mentioned above.     Problem/Plan - 1:  ·  Problem: Generalized weakness.  Plan: Unknown cause .  Pt presented with generalized weakness  CT head did not show acute infarct or hemorrhage  Resolved.    Problem/Plan - 2:  ·  Problem: Herpes zoster ophthalmicus, right eye.  Plan: Resolving. Pt has erythema and scaly lesion at V1 dermatome, around right eye  Continue valacyclovir.   ID helping .  Cortisporin eye drops restarted.    Problem/Plan - 3:  ·  Problem: Hyponatremia/ Hypokalemia .  Plan: Corrected.      Problem/Plan - 4:  ·  Problem: Schizophrenia.  Plan: Psychiatry helping.   Continue home medications.      Problem/Plan - 5:  ·  Problem: Prophylactic measure.  Plan:  Lovenox for DVT prophylaxis.     Disposition : DC planning pending placement as Medically stable.    No more labs.

## 2019-09-19 ENCOUNTER — TRANSCRIPTION ENCOUNTER (OUTPATIENT)
Age: 74
End: 2019-09-19

## 2019-09-19 VITALS
SYSTOLIC BLOOD PRESSURE: 126 MMHG | RESPIRATION RATE: 16 BRPM | DIASTOLIC BLOOD PRESSURE: 50 MMHG | TEMPERATURE: 97 F | HEART RATE: 84 BPM | OXYGEN SATURATION: 99 %

## 2019-09-19 RX ORDER — SENNA PLUS 8.6 MG/1
2 TABLET ORAL
Qty: 0 | Refills: 0 | DISCHARGE
Start: 2019-09-19

## 2019-09-19 RX ORDER — POLYETHYLENE GLYCOL 3350 17 G/17G
17 POWDER, FOR SOLUTION ORAL
Qty: 0 | Refills: 0 | DISCHARGE
Start: 2019-09-19

## 2019-09-19 RX ADMIN — ENOXAPARIN SODIUM 40 MILLIGRAM(S): 100 INJECTION SUBCUTANEOUS at 11:02

## 2019-09-19 RX ADMIN — AMLODIPINE BESYLATE 10 MILLIGRAM(S): 2.5 TABLET ORAL at 05:51

## 2019-09-19 RX ADMIN — RISPERIDONE 0.25 MILLIGRAM(S): 4 TABLET ORAL at 05:51

## 2019-09-19 RX ADMIN — NEOMYCIN SULFATE, POLYMYXIN B SULFATE AND HYDROCORTISONE 1 DROP(S): 3.5; 10000; 1 SUSPENSION OPHTHALMIC at 05:51

## 2019-09-19 RX ADMIN — Medication 650 MILLIGRAM(S): at 11:01

## 2019-09-19 RX ADMIN — Medication 650 MILLIGRAM(S): at 12:02

## 2019-09-19 RX ADMIN — NEOMYCIN SULFATE, POLYMYXIN B SULFATE AND HYDROCORTISONE 1 DROP(S): 3.5; 10000; 1 SUSPENSION OPHTHALMIC at 11:02

## 2019-09-19 NOTE — PROGRESS NOTE ADULT - PROBLEM SELECTOR PLAN 4
-completed course, dry scar to forehead and upper Rt eye   -cont  pain mgmt   -f/u outpt. with ophthalmologist

## 2019-09-19 NOTE — PROGRESS NOTE ADULT - PROBLEM SELECTOR PROBLEM 3
Herpes zoster ophthalmicus, right eye
Hyponatremia
Conjunctivitis of right eye
Herpes zoster ophthalmicus, right eye
Hyponatremia
Herpes zoster ophthalmicus, right eye
Generalized weakness

## 2019-09-19 NOTE — PROGRESS NOTE ADULT - SUBJECTIVE AND OBJECTIVE BOX
INTERVAL HPI: 73 y/o F with PMH of schizophrenia who presented with generalized weakness and difficulty ambulating at home and with right upper forehead and eye redness not associated with pain or visual disturbances. Admitted to medicine for generalized weakness, found with Shingles-Zoster Ophthalmicus.  Pt. completed course for Valtrex and steroid eye drop for herpes zoster in Rt eye with almost complete resolution of lesions and discomfort. continues to have generalized weakness and social issues, needs [facility] placement per PT evaluation which she initially refused to.  Pt was seen by psych last Friday for capacity evaluation determined with capacity to sign on consent to go to [facility]. Started on risperdal and tolerating well.   Level 2 approval is pending per . Otherwise, pt is medically stable for discharge.      OVERNIGHT EVENTS: no acute events overnight, remains HD stable         REVIEW OF SYSTEMS:  CONSTITUTIONAL: No fever, chills  NECK: No pain or stiffness  RESPIRATORY: No cough, SOB  CARDIOVASCULAR: No chest pain, palpitations  GASTROINTESTINAL: No abdominal pain. No nausea, vomiting, or diarrhea  GENITOURINARY: No dysuria  NEUROLOGICAL: No HA  MUSCULOSKELETAL: No joint pain or swelling; No muscle, back, or extremity pain    T(C): 36.9 (09-19-19 @ 08:24), Max: 36.9 (09-19-19 @ 08:24)  HR: 77 (09-19-19 @ 08:24) (63 - 77)  BP: 136/56 (09-19-19 @ 08:24) (132/50 - 149/47)  RR: 16 (09-19-19 @ 08:24) (16 - 17)  SpO2: 98% (09-19-19 @ 08:24) (98% - 99%)  Wt(kg): --Vital Signs Last 24 Hrs  T(C): 36.9 (19 Sep 2019 08:24), Max: 36.9 (19 Sep 2019 08:24)  T(F): 98.4 (19 Sep 2019 08:24), Max: 98.4 (19 Sep 2019 08:24)  HR: 77 (19 Sep 2019 08:24) (63 - 77)  BP: 136/56 (19 Sep 2019 08:24) (132/50 - 149/47)  BP(mean): --  RR: 16 (19 Sep 2019 08:24) (16 - 17)  SpO2: 98% (19 Sep 2019 08:24) (98% - 99%)    MEDICATIONS  (STANDING):  amLODIPine   Tablet 10 milliGRAM(s) Oral daily  enoxaparin Injectable 40 milliGRAM(s) SubCutaneous daily  hydrocortisone/polymyxin/neomycin Suspension (CORTISPORIN) 1 Drop(s) Right EYE four times a day  polyethylene glycol 3350 17 Gram(s) Oral two times a day  risperiDONE   Tablet 0.25 milliGRAM(s) Oral every 12 hours  senna 2 Tablet(s) Oral at bedtime    MEDICATIONS  (PRN):  acetaminophen   Tablet .. 650 milliGRAM(s) Oral every 6 hours PRN Mild Pain (1 - 3)      PHYSICAL EXAM:  GENERAL: NAD  ENMT: Moist mucous membranes  NECK: Supple, No JVD  CHEST/LUNG: Clear to auscultation bilaterally; No rales, rhonchi, wheezing, or rubs  HEART: S1, S2, Regular rate and rhythm  ABDOMEN: Soft, Nontender, Nondistended; Bowel sounds present  NEURO: Alert & Oriented X3  EXTREMITIES: No LE edema, no calf tenderness    Consultant(s) Notes Reviewed:  [x ] YES  [ ] NO  Care Discussed with Consultants/Other Providers [ x] YES  [ ] NO    LABS:  CAPILLARY BLOOD GLUCOSE    RADIOLOGY & ADDITIONAL TESTS:    < from: CT Head No Cont (09.06.19 @ 22:21) >    EXAM:  CT BRAIN                            PROCEDURE DATE:  09/06/2019          INTERPRETATION:    Clinical Indication: Right V1 shingles.    Comparison: None    Technique: Noncontrast axial CT images of the head were acquired. Coronal   and sagittal reformats were obtained.    Findings:   The ventricles and sulci are prominent in size, compatible with mild   generalized parenchymal volume loss. No acute intracranial hemorrhage is   identified.  No extra-axial fluid collection is identified.  No mass   effect or midline shift is seen.  There is no evidence of acute   territorial infarct.  There are periventricular and subcortical white   matter hypodensities, which are nonspecific, but likely reflect mild   microvascular ischemic disease.   The visualized paranasal sinuses are clear. The mastoid air cells are   well aerated.  No acute osseous abnormality is seen.       Impression: No CT evidence of acute intracranial abnormality.    < end of copied text >      Imaging Personally Reviewed:  [ ] YES  [ ] NO

## 2019-09-19 NOTE — PROGRESS NOTE ADULT - PROBLEM SELECTOR PROBLEM 1
Generalized weakness
Paranoid schizophrenia
Generalized weakness
Generalized weakness
Herpes zoster ophthalmicus, right eye
Herpes zoster ophthalmicus, right eye
Paranoid schizophrenia
Paranoid schizophrenia
Discharge planning issues

## 2019-09-19 NOTE — DISCHARGE NOTE NURSING/CASE MANAGEMENT/SOCIAL WORK - PATIENT PORTAL LINK FT
You can access the FollowMyHealth Patient Portal offered by [facility] by registering at the following website: [email or web address]. By joining [employer]’s FollowMyHealth portal, you will also be able to view your health information using other applications (apps) compatible with our system.

## 2019-09-19 NOTE — PROGRESS NOTE ADULT - PROBLEM SELECTOR PLAN 1
-PT rec [facility] for rehab initially pt refused, had psych eval for capacity, pt now agrees to go to [facility] and she can sign on consent per psychiatry  -Level 2 approval is pending per  and once it is cleared she is medically stable for d/c since last Friday.

## 2019-09-19 NOTE — PROGRESS NOTE ADULT - PROBLEM SELECTOR PROBLEM 2
Generalized weakness
Herpes zoster ophthalmicus, right eye
Generalized weakness
Herpes zoster ophthalmicus, right eye
Herpes zoster ophthalmicus, right eye
Generalized weakness
Paranoid schizophrenia

## 2019-09-19 NOTE — PROGRESS NOTE ADULT - PROVIDER SPECIALTY LIST ADULT
Infectious Disease
Internal Medicine

## 2019-09-19 NOTE — PROGRESS NOTE ADULT - PROBLEM SELECTOR PROBLEM 4
Constipation
Hypertension
Hyponatremia
Prophylactic measure
Prophylactic measure
Herpes zoster ophthalmicus, right eye

## 2019-09-19 NOTE — PROGRESS NOTE ADULT - SUBJECTIVE AND OBJECTIVE BOX
INTERVAL HPI/OVERNIGHT EVENTS: I feel weak otherwise fine.   Vital Signs Last 24 Hrs  T(C): 36.9 (19 Sep 2019 08:24), Max: 36.9 (19 Sep 2019 08:24)  T(F): 98.4 (19 Sep 2019 08:24), Max: 98.4 (19 Sep 2019 08:24)  HR: 77 (19 Sep 2019 08:24) (63 - 77)  BP: 136/56 (19 Sep 2019 08:24) (132/50 - 149/47)  BP(mean): --  RR: 16 (19 Sep 2019 08:24) (16 - 17)  SpO2: 98% (19 Sep 2019 08:24) (98% - 99%)  I&O's Summary    MEDICATIONS  (STANDING):  amLODIPine   Tablet 10 milliGRAM(s) Oral daily  enoxaparin Injectable 40 milliGRAM(s) SubCutaneous daily  hydrocortisone/polymyxin/neomycin Suspension (CORTISPORIN) 1 Drop(s) Right EYE four times a day  polyethylene glycol 3350 17 Gram(s) Oral two times a day  risperiDONE   Tablet 0.25 milliGRAM(s) Oral every 12 hours  senna 2 Tablet(s) Oral at bedtime    MEDICATIONS  (PRN):  acetaminophen   Tablet .. 650 milliGRAM(s) Oral every 6 hours PRN Mild Pain (1 - 3)    LABS:              CAPILLARY BLOOD GLUCOSE              REVIEW OF SYSTEMS:  CONSTITUTIONAL: No fever, weight loss, or fatigue  EYES: No eye pain, visual disturbances, or discharge  ENMT:  No difficulty hearing, tinnitus, vertigo; No sinus or throat pain  NECK: No pain or stiffness  RESPIRATORY: No cough, wheezing, chills or hemoptysis; No shortness of breath  CARDIOVASCULAR: No chest pain, palpitations, dizziness, or leg swelling  GASTROINTESTINAL: No abdominal or epigastric pain. No nausea, vomiting, or hematemesis; No diarrhea or constipation. No melena or hematochezia.  GENITOURINARY: No dysuria, frequency, hematuria, or incontinence  NEUROLOGICAL: No headaches, memory loss, loss of strength, numbness, or tremors    Consultant(s) Notes Reviewed:  [x ] YES  [ ] NO    PHYSICAL EXAM:  GENERAL: NAD, well-groomed, well-developed,not in any distress ,  HEAD:  Atraumatic, Normocephalic  EYES: EOMI, PERRLA, conjunctiva and sclera clear but Rt eye congestion+  ENMT: No tonsillar erythema, exudates, or enlargement; Moist mucous membranes, Good dentition, No lesions  NECK: Supple, No JVD, Normal thyroid  NERVOUS SYSTEM:  Alert & Oriented X3, No focal deficit   CHEST/LUNG: Good air entry bilateral with no  rales, rhonchi, wheezing, or rubs  HEART: Regular rate and rhythm; No murmurs, rubs, or gallops  ABDOMEN: Soft, Nontender, Nondistended; Bowel sounds present  EXTREMITIES:  2+ Peripheral Pulses, No clubbing, cyanosis, or edema    Care Discussed with Consultants/Other Providers [ x] YES  [ ] NO

## 2019-09-19 NOTE — PROGRESS NOTE ADULT - ASSESSMENT
73 y/o F with PMH of schizophrenia who presented with generalized weakness since 2 days. According to the pt, since few days she has been feeling weak and has difficultly ambulating. She has right upper forehead and eye redness. She denied pain and itching.  Pt seems confused at  times. She denied fever, shortness of breath, chest pain, loss of consciousness and all other review of systems except mentioned above.     Problem/Plan - 1:  ·  Problem: Generalized weakness.  Plan: Unknown cause .  Pt presented with generalized weakness  CT head did not show acute infarct or hemorrhage  Resolved.    Problem/Plan - 2:  ·  Problem: Herpes zoster ophthalmicus, right eye.  Plan: Resolving. Pt has erythema and scaly lesion at V1 dermatome, around right eye  Continue valacyclovir.   ID helping .  Cortisporin eye drops restarted.    Problem/Plan - 3:  ·  Problem: Hyponatremia/ Hypokalemia .  Plan: Corrected.      Problem/Plan - 4:  ·  Problem: Schizophrenia.  Plan: Psychiatry helping.   Continue home medications.      Problem/Plan - 5:  ·  Problem: Prophylactic measure.  Plan:  Lovenox for DVT prophylaxis.     Disposition : DC planning pending placement as Medically stable.    No more labs. Needs outpt Opthalmology check up as none in this hospital.

## 2019-09-19 NOTE — PROGRESS NOTE ADULT - ASSESSMENT
75 y/o F with PMH of schizophrenia  Admitted to medicine for generalized weakness, found with Shingles-Zoster Ophthalmicus.

## 2019-09-19 NOTE — PROGRESS NOTE ADULT - REASON FOR ADMISSION
Generalized weakness

## 2019-10-01 PROCEDURE — 84100 ASSAY OF PHOSPHORUS: CPT

## 2019-10-01 PROCEDURE — 87086 URINE CULTURE/COLONY COUNT: CPT

## 2019-10-01 PROCEDURE — 99285 EMERGENCY DEPT VISIT HI MDM: CPT | Mod: 25

## 2019-10-01 PROCEDURE — 81001 URINALYSIS AUTO W/SCOPE: CPT

## 2019-10-01 PROCEDURE — 85027 COMPLETE CBC AUTOMATED: CPT

## 2019-10-01 PROCEDURE — 83036 HEMOGLOBIN GLYCOSYLATED A1C: CPT

## 2019-10-01 PROCEDURE — 83735 ASSAY OF MAGNESIUM: CPT

## 2019-10-01 PROCEDURE — 93005 ELECTROCARDIOGRAM TRACING: CPT

## 2019-10-01 PROCEDURE — 84443 ASSAY THYROID STIM HORMONE: CPT

## 2019-10-01 PROCEDURE — 82607 VITAMIN B-12: CPT

## 2019-10-01 PROCEDURE — 80048 BASIC METABOLIC PNL TOTAL CA: CPT

## 2020-08-27 NOTE — ED ADULT NURSE NOTE - NSSUSCREENINGQ1_ED_ALL_ED
IV removed, cath tip intact.  [facility] Will continue to monitor. No

## 2020-12-30 ENCOUNTER — INPATIENT (INPATIENT)
Facility: HOSPITAL | Age: 75
LOS: 6 days | Discharge: NURSING, REHAB OR LONG-TERM CARE | DRG: 558 | End: 2021-01-06
Attending: INTERNAL MEDICINE | Admitting: INTERNAL MEDICINE
Payer: MEDICARE

## 2020-12-30 VITALS
DIASTOLIC BLOOD PRESSURE: 79 MMHG | RESPIRATION RATE: 17 BRPM | TEMPERATURE: 99 F | HEIGHT: 66 IN | OXYGEN SATURATION: 97 % | HEART RATE: 90 BPM | SYSTOLIC BLOOD PRESSURE: 201 MMHG

## 2020-12-30 DIAGNOSIS — Z71.89 OTHER SPECIFIED COUNSELING: ICD-10-CM

## 2020-12-30 DIAGNOSIS — M62.82 RHABDOMYOLYSIS: ICD-10-CM

## 2020-12-30 DIAGNOSIS — R73.9 HYPERGLYCEMIA, UNSPECIFIED: ICD-10-CM
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
## 2020-12-30 DIAGNOSIS — I10 ESSENTIAL (PRIMARY) HYPERTENSION: ICD-10-CM

## 2020-12-30 DIAGNOSIS — Z29.9 ENCOUNTER FOR PROPHYLACTIC MEASURES, UNSPECIFIED: ICD-10-CM

## 2020-12-30 PROBLEM — F20.9 SCHIZOPHRENIA, UNSPECIFIED: Chronic | Status: ACTIVE | Noted: 2019-09-07

## 2020-12-30 LAB
ALBUMIN SERPL ELPH-MCNC: 3.4 G/DL — LOW (ref 3.5–5)
ALP SERPL-CCNC: 107 U/L — SIGNIFICANT CHANGE UP (ref 40–120)
ALT FLD-CCNC: 51 U/L DA — SIGNIFICANT CHANGE UP (ref 10–60)
AMMONIA BLD-MCNC: <10 UMOL/L — LOW (ref 11–32)
ANION GAP SERPL CALC-SCNC: 16 MMOL/L — SIGNIFICANT CHANGE UP (ref 5–17)
ANION GAP SERPL CALC-SCNC: 17 MMOL/L — SIGNIFICANT CHANGE UP (ref 5–17)
APTT BLD: 32.8 SEC — SIGNIFICANT CHANGE UP (ref 27.5–35.5)
AST SERPL-CCNC: 82 U/L — HIGH (ref 10–40)
BASOPHILS # BLD AUTO: 0 K/UL — SIGNIFICANT CHANGE UP (ref 0–0.2)
BASOPHILS NFR BLD AUTO: 0 % — SIGNIFICANT CHANGE UP (ref 0–2)
BILIRUB SERPL-MCNC: 1.1 MG/DL — SIGNIFICANT CHANGE UP (ref 0.2–1.2)
BUN SERPL-MCNC: 17 MG/DL — SIGNIFICANT CHANGE UP (ref 7–18)
BUN SERPL-MCNC: 20 MG/DL — HIGH (ref 7–18)
CALCIUM SERPL-MCNC: 7.6 MG/DL — LOW (ref 8.4–10.5)
CALCIUM SERPL-MCNC: 8.3 MG/DL — LOW (ref 8.4–10.5)
CHLORIDE SERPL-SCNC: 101 MMOL/L — SIGNIFICANT CHANGE UP (ref 96–108)
CHLORIDE SERPL-SCNC: 96 MMOL/L — SIGNIFICANT CHANGE UP (ref 96–108)
CHOLEST SERPL-MCNC: 255 MG/DL — HIGH
CK SERPL-CCNC: 1414 U/L — HIGH (ref 21–215)
CK SERPL-CCNC: 2416 U/L — HIGH (ref 21–215)
CO2 SERPL-SCNC: 14 MMOL/L — LOW (ref 22–31)
CO2 SERPL-SCNC: 18 MMOL/L — LOW (ref 22–31)
CREAT SERPL-MCNC: 0.55 MG/DL — SIGNIFICANT CHANGE UP (ref 0.5–1.3)
CREAT SERPL-MCNC: 0.77 MG/DL — SIGNIFICANT CHANGE UP (ref 0.5–1.3)
EOSINOPHIL # BLD AUTO: 0 K/UL — SIGNIFICANT CHANGE UP (ref 0–0.5)
EOSINOPHIL NFR BLD AUTO: 0 % — SIGNIFICANT CHANGE UP (ref 0–6)
ERYTHROCYTE [SEDIMENTATION RATE] IN BLOOD: 21 MM/HR — HIGH (ref 0–20)
ETHANOL SERPL-MCNC: <3 MG/DL — SIGNIFICANT CHANGE UP (ref 0–10)
GLUCOSE BLDC GLUCOMTR-MCNC: 259 MG/DL — HIGH (ref 70–99)
GLUCOSE SERPL-MCNC: 265 MG/DL — HIGH (ref 70–99)
GLUCOSE SERPL-MCNC: 297 MG/DL — HIGH (ref 70–99)
HCT VFR BLD CALC: 46.2 % — HIGH (ref 34.5–45)
HDLC SERPL-MCNC: 47 MG/DL — LOW
HGB BLD-MCNC: 15.2 G/DL — SIGNIFICANT CHANGE UP (ref 11.5–15.5)
INR BLD: 1.12 RATIO — SIGNIFICANT CHANGE UP (ref 0.88–1.16)
IRON SATN MFR SERPL: 25 UG/DL — LOW (ref 40–150)
IRON SATN MFR SERPL: 9 % — LOW (ref 15–50)
LACTATE SERPL-SCNC: 1.4 MMOL/L — SIGNIFICANT CHANGE UP (ref 0.7–2)
LIPID PNL WITH DIRECT LDL SERPL: 173 MG/DL — HIGH
LYMPHOCYTES # BLD AUTO: 0.62 K/UL — LOW (ref 1–3.3)
LYMPHOCYTES # BLD AUTO: 5 % — LOW (ref 13–44)
MAGNESIUM SERPL-MCNC: 2.2 MG/DL — SIGNIFICANT CHANGE UP (ref 1.6–2.6)
MCHC RBC-ENTMCNC: 29.2 PG — SIGNIFICANT CHANGE UP (ref 27–34)
MCHC RBC-ENTMCNC: 32.9 GM/DL — SIGNIFICANT CHANGE UP (ref 32–36)
MCV RBC AUTO: 88.7 FL — SIGNIFICANT CHANGE UP (ref 80–100)
MONOCYTES # BLD AUTO: 0.25 K/UL — SIGNIFICANT CHANGE UP (ref 0–0.9)
MONOCYTES NFR BLD AUTO: 2 % — SIGNIFICANT CHANGE UP (ref 2–14)
NEUTROPHILS # BLD AUTO: 11.59 K/UL — HIGH (ref 1.8–7.4)
NEUTROPHILS NFR BLD AUTO: 93 % — HIGH (ref 43–77)
NON HDL CHOLESTEROL: 208 MG/DL — HIGH
NT-PROBNP SERPL-SCNC: 3429 PG/ML — HIGH (ref 0–450)
PHOSPHATE SERPL-MCNC: 2.1 MG/DL — LOW (ref 2.5–4.5)
PLATELET # BLD AUTO: 232 K/UL — SIGNIFICANT CHANGE UP (ref 150–400)
POTASSIUM SERPL-MCNC: 4.1 MMOL/L — SIGNIFICANT CHANGE UP (ref 3.5–5.3)
POTASSIUM SERPL-MCNC: 6.3 MMOL/L — CRITICAL HIGH (ref 3.5–5.3)
POTASSIUM SERPL-SCNC: 4.1 MMOL/L — SIGNIFICANT CHANGE UP (ref 3.5–5.3)
POTASSIUM SERPL-SCNC: 6.3 MMOL/L — CRITICAL HIGH (ref 3.5–5.3)
PROT SERPL-MCNC: 8.4 G/DL — HIGH (ref 6–8.3)
PROTHROM AB SERPL-ACNC: 13.2 SEC — SIGNIFICANT CHANGE UP (ref 10.6–13.6)
RAPID RVP RESULT: SIGNIFICANT CHANGE UP
RBC # BLD: 4.82 M/UL — SIGNIFICANT CHANGE UP (ref 3.8–5.2)
RBC # BLD: 5.21 M/UL — HIGH (ref 3.8–5.2)
RBC # FLD: 12.3 % — SIGNIFICANT CHANGE UP (ref 10.3–14.5)
RETICS #: 70.9 K/UL — SIGNIFICANT CHANGE UP (ref 25–125)
RETICS/RBC NFR: 1.5 % — SIGNIFICANT CHANGE UP (ref 0.5–2.5)
SARS-COV-2 RNA SPEC QL NAA+PROBE: SIGNIFICANT CHANGE UP
SODIUM SERPL-SCNC: 130 MMOL/L — LOW (ref 135–145)
SODIUM SERPL-SCNC: 132 MMOL/L — LOW (ref 135–145)
TIBC SERPL-MCNC: 270 UG/DL — SIGNIFICANT CHANGE UP (ref 250–450)
TRIGL SERPL-MCNC: 176 MG/DL — HIGH
TROPONIN I SERPL-MCNC: 0.04 NG/ML — SIGNIFICANT CHANGE UP (ref 0–0.04)
TROPONIN I SERPL-MCNC: 0.12 NG/ML — HIGH (ref 0–0.04)
TSH SERPL-MCNC: 1.2 UU/ML — SIGNIFICANT CHANGE UP (ref 0.34–4.82)
UIBC SERPL-MCNC: 245 UG/DL — SIGNIFICANT CHANGE UP (ref 110–370)
URATE SERPL-MCNC: 6.9 MG/DL — SIGNIFICANT CHANGE UP (ref 2.5–7)
WBC # BLD: 12.46 K/UL — HIGH (ref 3.8–10.5)
WBC # FLD AUTO: 12.46 K/UL — HIGH (ref 3.8–10.5)

## 2020-12-30 PROCEDURE — 73522 X-RAY EXAM HIPS BI 3-4 VIEWS: CPT | Mod: 26

## 2020-12-30 PROCEDURE — 71045 X-RAY EXAM CHEST 1 VIEW: CPT | Mod: 26

## 2020-12-30 PROCEDURE — 72125 CT NECK SPINE W/O DYE: CPT | Mod: 26

## 2020-12-30 PROCEDURE — 93970 EXTREMITY STUDY: CPT | Mod: 26

## 2020-12-30 PROCEDURE — 99285 EMERGENCY DEPT VISIT HI MDM: CPT

## 2020-12-30 PROCEDURE — 70450 CT HEAD/BRAIN W/O DYE: CPT | Mod: 26

## 2020-12-30 RX ORDER — AMLODIPINE BESYLATE 2.5 MG/1
5 TABLET ORAL DAILY
Refills: 0 | Status: DISCONTINUED | OUTPATIENT
Start: 2020-12-30 | End: 2020-12-31

## 2020-12-30 RX ORDER — DEXTROSE 50 % IN WATER 50 %
25 SYRINGE (ML) INTRAVENOUS ONCE
Refills: 0 | Status: DISCONTINUED | OUTPATIENT
Start: 2020-12-30 | End: 2020-12-31

## 2020-12-30 RX ORDER — SODIUM CHLORIDE 9 MG/ML
1000 INJECTION INTRAMUSCULAR; INTRAVENOUS; SUBCUTANEOUS ONCE
Refills: 0 | Status: COMPLETED | OUTPATIENT
Start: 2020-12-30 | End: 2020-12-30

## 2020-12-30 RX ORDER — ENOXAPARIN SODIUM 100 MG/ML
40 INJECTION SUBCUTANEOUS DAILY
Refills: 0 | Status: DISCONTINUED | OUTPATIENT
Start: 2020-12-30 | End: 2021-01-06

## 2020-12-30 RX ORDER — GLUCAGON INJECTION, SOLUTION 0.5 MG/.1ML
1 INJECTION, SOLUTION SUBCUTANEOUS ONCE
Refills: 0 | Status: DISCONTINUED | OUTPATIENT
Start: 2020-12-30 | End: 2021-01-06

## 2020-12-30 RX ORDER — SODIUM CHLORIDE 9 MG/ML
1000 INJECTION INTRAMUSCULAR; INTRAVENOUS; SUBCUTANEOUS
Refills: 0 | Status: DISCONTINUED | OUTPATIENT
Start: 2020-12-30 | End: 2020-12-30

## 2020-12-30 RX ORDER — LABETALOL HCL 100 MG
2.5 TABLET ORAL ONCE
Refills: 0 | Status: COMPLETED | OUTPATIENT
Start: 2020-12-30 | End: 2020-12-30

## 2020-12-30 RX ORDER — DEXTROSE 50 % IN WATER 50 %
12.5 SYRINGE (ML) INTRAVENOUS ONCE
Refills: 0 | Status: DISCONTINUED | OUTPATIENT
Start: 2020-12-30 | End: 2020-12-31

## 2020-12-30 RX ORDER — DEXTROSE 50 % IN WATER 50 %
25 SYRINGE (ML) INTRAVENOUS ONCE
Refills: 0 | Status: DISCONTINUED | OUTPATIENT
Start: 2020-12-30 | End: 2021-01-06

## 2020-12-30 RX ORDER — SODIUM CHLORIDE 9 MG/ML
1000 INJECTION INTRAMUSCULAR; INTRAVENOUS; SUBCUTANEOUS
Refills: 0 | Status: DISCONTINUED | OUTPATIENT
Start: 2020-12-30 | End: 2020-12-31

## 2020-12-30 RX ORDER — SODIUM CHLORIDE 9 MG/ML
1000 INJECTION, SOLUTION INTRAVENOUS
Refills: 0 | Status: DISCONTINUED | OUTPATIENT
Start: 2020-12-30 | End: 2021-01-06

## 2020-12-30 RX ORDER — INSULIN LISPRO 100/ML
VIAL (ML) SUBCUTANEOUS
Refills: 0 | Status: DISCONTINUED | OUTPATIENT
Start: 2020-12-30 | End: 2021-01-06

## 2020-12-30 RX ORDER — DEXTROSE 50 % IN WATER 50 %
15 SYRINGE (ML) INTRAVENOUS ONCE
Refills: 0 | Status: DISCONTINUED | OUTPATIENT
Start: 2020-12-30 | End: 2021-01-06

## 2020-12-30 RX ADMIN — SODIUM CHLORIDE 200 MILLILITER(S): 9 INJECTION INTRAMUSCULAR; INTRAVENOUS; SUBCUTANEOUS at 16:23

## 2020-12-30 RX ADMIN — SODIUM CHLORIDE 1000 MILLILITER(S): 9 INJECTION INTRAMUSCULAR; INTRAVENOUS; SUBCUTANEOUS at 12:02

## 2020-12-30 RX ADMIN — Medication 2.5 MILLIGRAM(S): at 23:53

## 2020-12-30 RX ADMIN — AMLODIPINE BESYLATE 5 MILLIGRAM(S): 2.5 TABLET ORAL at 19:01

## 2020-12-30 RX ADMIN — Medication 3: at 23:07

## 2020-12-30 NOTE — H&P ADULT - PROBLEM SELECTOR PLAN 4
-patient presented with high blood sugar level.  -started on sliding scale  -f/u HBA1c -patient presented with high blood sugar level.  -started on sliding scale  -f/u HBA1c  - endo consulted Dr Wiley.

## 2020-12-30 NOTE — H&P ADULT - PROBLEM SELECTOR PLAN 6
RISK                                                          Points  [] Previous VTE                                           3  [] Thrombophilia                                        2  [] Lower limb paralysis                              2   [] Current Cancer                                       2   [x] Immobilization > 24 hrs                        1  [] ICU/CCU stay > 24 hours                       1  [x] Age > 60                                                   1  score 2   on levonox

## 2020-12-30 NOTE — H&P ADULT - HISTORY OF PRESENT ILLNESS
Patient has 75 year old Female  with no PMHx, presents to the ED after fall yesterday 12/29 and generalized weakness. Patient reports no LOC, chest pain or shortness of breath. Patient endorses she was unable to get up and laid on the floor all day until her super found her on floor. Patient endorses pain to the hip, denies numbness, focal weakness. Pt is unable to ambulate.   75 year old Female  with no PMHx,walks with a walker  presented to the ED after fall yesterday 12/29 and generalized weakness. Patient admits that she was going to get the news paper then she slipped and wasnot able to stand and was left on the floor for almost a day until her super found her on floor . Patient reports no LOC, chest pain or shortness of breath.  Patient endorses pain to the hip, denies numbness, focal weakness. diarrhea, constipation or abdominal pain . she was admitted to the hospital 1 year ago for generalized weakness and was discharged on medications for HTN and schizophrenia that was diagnosed at that time but she didnot pick the medications and never went to a doctor since then . The patient vomited once while interviewing her,   75 year old Female  with  PMHx of scizophrenia not on any meds,walks with a walker  presented to the ED after fall yesterday 12/29 and generalized weakness. Patient admits that she was going to get the news paper then she slipped and wasnot able to stand and was left on the floor for almost a day until her super found her on floor . Patient reports no LOC, chest pain or shortness of breath.  Patient endorses pain to the hip, denies numbness, focal weakness. diarrhea, constipation or abdominal pain . she was admitted to the hospital 1 year ago for generalized weakness and was discharged on medications for HTN and schizophrenia that was diagnosed at that time but she didnot pick the medications and never went to a doctor since then . The patient vomited once while interviewing her,

## 2020-12-30 NOTE — H&P ADULT - PROBLEM SELECTOR PLAN 1
- patient presented with a fall and was left on the floor for aday   - CK 2416   - started on aggressive hydration at 200 cc   - f/u u/s doppler LEs

## 2020-12-30 NOTE — ED PROVIDER NOTE - PROGRESS NOTE DETAILS
patient xray reviewed, no fracture, noting rhabdo on lab, still unable to ambulate, admitted to dr robles

## 2020-12-30 NOTE — H&P ADULT - ATTENDING COMMENTS
Patient has 75 year old Female  with no PMHx, presents to the ED after fall yesterday 12/29 and generalized weakness. Patient reports no LOC, chest pain or shortness of breath. Patient endorses she was unable to get up and laid on the floor all day until her super found her on floor. Patient endorses pain to the hip, denies numbness, focal weakness. Pt is unable to ambulate.       assessment  --  s/p fall, rhabdomyolysis, hyperglycemia r/o dm     plan  --  admit to med, ivf, gi and dvt profilaxis,  cbc, bmp, mg, phos, lipids, tsh, ua, ck, hgba1c      phys tx eval  pain mgmt eval

## 2020-12-30 NOTE — ED PROVIDER NOTE - CROS ED [facility] ALL NEG
If symptoms worsen with fever, rash, increased respiratory effort return to clinic for further evaluation.     Thank you  Sol Dunham CNP    
- - -

## 2020-12-30 NOTE — ED ADULT NURSE NOTE - OBJECTIVE STATEMENT
pt presents to ed for c/o fall. pt endorses she lives at home and ambulates w/ a walker. Endorses feeling weak and falling at home. Denies head trauma or LOC. No deformities noted. Denies any other acute complaints.

## 2020-12-30 NOTE — H&P ADULT - PROBLEM SELECTOR PLAN 2
- patient presented with a fall and was left on the floor for aday   - CK 2416   - started on aggressive hydration at 200 cc   - f/u u/s doppler LEs  - hip xray showed no bone pathology   -CT head no acute event  -f/u echo   - f/u PT eval

## 2020-12-30 NOTE — ED PROVIDER NOTE - CLINICAL SUMMARY MEDICAL DECISION MAKING FREE TEXT BOX
Patient presenting s/p fall. unable to ambulate. will obtain lab, assess for acs, rhabdo. ct head, cxr, pelvis, likely admit for inability to ambulate

## 2020-12-30 NOTE — H&P ADULT - PROBLEM SELECTOR PLAN 3
-patient presented with high blood pressure   -started on amlodipine mg   monitor BP   DASH diet -patient presented with high blood pressure   -started on amlodipine 5mg   monitor BP   DASH diet

## 2020-12-30 NOTE — ED ADULT NURSE NOTE - CHPI ED NUR SYMPTOMS NEG
no abrasion/no bleeding/no confusion/no deformity/no fever/no loss of consciousness/no numbness/no tingling/no vomiting

## 2020-12-30 NOTE — ED PROVIDER NOTE - OBJECTIVE STATEMENT
74 y/o F pt with no PMHx, presents to the ED s/p fall yesterday and generalized weakness. Patient reports no LOC, chest pain or shortness of breath. Patient endorses she was unable to get up and laid on the floor all day until her super found her. Patient endorses pain to the hip, denies numbness, focal weakness or any other complaints.

## 2020-12-31 ENCOUNTER — TRANSCRIPTION ENCOUNTER (OUTPATIENT)
Age: 75
End: 2020-12-31

## 2020-12-31 DIAGNOSIS — E56.9 VITAMIN DEFICIENCY, UNSPECIFIED: ICD-10-CM

## 2020-12-31 DIAGNOSIS — M62.82 RHABDOMYOLYSIS: ICD-10-CM

## 2020-12-31 DIAGNOSIS — E11.9 TYPE 2 DIABETES MELLITUS WITHOUT COMPLICATIONS: ICD-10-CM

## 2020-12-31 DIAGNOSIS — E78.5 HYPERLIPIDEMIA, UNSPECIFIED: ICD-10-CM

## 2020-12-31 DIAGNOSIS — I10 ESSENTIAL (PRIMARY) HYPERTENSION: ICD-10-CM

## 2020-12-31 DIAGNOSIS — E87.8 OTHER DISORDERS OF ELECTROLYTE AND FLUID BALANCE, NOT ELSEWHERE CLASSIFIED: ICD-10-CM

## 2020-12-31 LAB
24R-OH-CALCIDIOL SERPL-MCNC: 14.3 NG/ML — LOW (ref 30–80)
24R-OH-CALCIDIOL SERPL-MCNC: 15.1 NG/ML — LOW (ref 30–80)
A1C WITH ESTIMATED AVERAGE GLUCOSE RESULT: 10.6 % — HIGH (ref 4–5.6)
A1C WITH ESTIMATED AVERAGE GLUCOSE RESULT: 10.8 % — HIGH (ref 4–5.6)
ALBUMIN SERPL ELPH-MCNC: 2.9 G/DL — LOW (ref 3.5–5)
ALP SERPL-CCNC: 91 U/L — SIGNIFICANT CHANGE UP (ref 40–120)
ALT FLD-CCNC: 42 U/L DA — SIGNIFICANT CHANGE UP (ref 10–60)
ANION GAP SERPL CALC-SCNC: 10 MMOL/L — SIGNIFICANT CHANGE UP (ref 5–17)
ANION GAP SERPL CALC-SCNC: 14 MMOL/L — SIGNIFICANT CHANGE UP (ref 5–17)
ANION GAP SERPL CALC-SCNC: 16 MMOL/L — SIGNIFICANT CHANGE UP (ref 5–17)
AST SERPL-CCNC: 41 U/L — HIGH (ref 10–40)
BASOPHILS # BLD AUTO: 0.02 K/UL — SIGNIFICANT CHANGE UP (ref 0–0.2)
BASOPHILS NFR BLD AUTO: 0.2 % — SIGNIFICANT CHANGE UP (ref 0–2)
BILIRUB SERPL-MCNC: 0.7 MG/DL — SIGNIFICANT CHANGE UP (ref 0.2–1.2)
BUN SERPL-MCNC: 20 MG/DL — HIGH (ref 7–18)
BUN SERPL-MCNC: 20 MG/DL — HIGH (ref 7–18)
BUN SERPL-MCNC: 24 MG/DL — HIGH (ref 7–18)
CALCIUM SERPL-MCNC: 7.8 MG/DL — LOW (ref 8.4–10.5)
CALCIUM SERPL-MCNC: 7.9 MG/DL — LOW (ref 8.4–10.5)
CALCIUM SERPL-MCNC: 7.9 MG/DL — LOW (ref 8.4–10.5)
CHLORIDE SERPL-SCNC: 101 MMOL/L — SIGNIFICANT CHANGE UP (ref 96–108)
CHLORIDE SERPL-SCNC: 102 MMOL/L — SIGNIFICANT CHANGE UP (ref 96–108)
CHLORIDE SERPL-SCNC: 103 MMOL/L — SIGNIFICANT CHANGE UP (ref 96–108)
CHOLEST SERPL-MCNC: 256 MG/DL — HIGH
CK SERPL-CCNC: 1048 U/L — HIGH (ref 21–215)
CK SERPL-CCNC: 1068 U/L — HIGH (ref 21–215)
CO2 SERPL-SCNC: 17 MMOL/L — LOW (ref 22–31)
CO2 SERPL-SCNC: 21 MMOL/L — LOW (ref 22–31)
CO2 SERPL-SCNC: 22 MMOL/L — SIGNIFICANT CHANGE UP (ref 22–31)
CREAT SERPL-MCNC: 0.63 MG/DL — SIGNIFICANT CHANGE UP (ref 0.5–1.3)
CREAT SERPL-MCNC: 0.68 MG/DL — SIGNIFICANT CHANGE UP (ref 0.5–1.3)
CREAT SERPL-MCNC: 0.68 MG/DL — SIGNIFICANT CHANGE UP (ref 0.5–1.3)
D DIMER BLD IA.RAPID-MCNC: 529 NG/ML DDU — HIGH
EOSINOPHIL # BLD AUTO: 0.01 K/UL — SIGNIFICANT CHANGE UP (ref 0–0.5)
EOSINOPHIL NFR BLD AUTO: 0.1 % — SIGNIFICANT CHANGE UP (ref 0–6)
ESTIMATED AVERAGE GLUCOSE: 258 MG/DL — HIGH (ref 68–114)
ESTIMATED AVERAGE GLUCOSE: 263 MG/DL — HIGH (ref 68–114)
FERRITIN SERPL-MCNC: 504 NG/ML — HIGH (ref 15–150)
FOLATE SERPL-MCNC: >20 NG/ML — SIGNIFICANT CHANGE UP
GLUCOSE BLDC GLUCOMTR-MCNC: 223 MG/DL — HIGH (ref 70–99)
GLUCOSE BLDC GLUCOMTR-MCNC: 232 MG/DL — HIGH (ref 70–99)
GLUCOSE BLDC GLUCOMTR-MCNC: 262 MG/DL — HIGH (ref 70–99)
GLUCOSE BLDC GLUCOMTR-MCNC: 273 MG/DL — HIGH (ref 70–99)
GLUCOSE SERPL-MCNC: 240 MG/DL — HIGH (ref 70–99)
GLUCOSE SERPL-MCNC: 246 MG/DL — HIGH (ref 70–99)
GLUCOSE SERPL-MCNC: 259 MG/DL — HIGH (ref 70–99)
HCT VFR BLD CALC: 41.7 % — SIGNIFICANT CHANGE UP (ref 34.5–45)
HDLC SERPL-MCNC: 41 MG/DL — LOW
HGB BLD-MCNC: 13.3 G/DL — SIGNIFICANT CHANGE UP (ref 11.5–15.5)
IMM GRANULOCYTES NFR BLD AUTO: 0.5 % — SIGNIFICANT CHANGE UP (ref 0–1.5)
LIPID PNL WITH DIRECT LDL SERPL: 180 MG/DL — HIGH
LYMPHOCYTES # BLD AUTO: 1.03 K/UL — SIGNIFICANT CHANGE UP (ref 1–3.3)
LYMPHOCYTES # BLD AUTO: 9.9 % — LOW (ref 13–44)
MAGNESIUM SERPL-MCNC: 2.5 MG/DL — SIGNIFICANT CHANGE UP (ref 1.6–2.6)
MCHC RBC-ENTMCNC: 28.7 PG — SIGNIFICANT CHANGE UP (ref 27–34)
MCHC RBC-ENTMCNC: 31.9 GM/DL — LOW (ref 32–36)
MCV RBC AUTO: 89.9 FL — SIGNIFICANT CHANGE UP (ref 80–100)
MONOCYTES # BLD AUTO: 0.86 K/UL — SIGNIFICANT CHANGE UP (ref 0–0.9)
MONOCYTES NFR BLD AUTO: 8.3 % — SIGNIFICANT CHANGE UP (ref 2–14)
NEUTROPHILS # BLD AUTO: 8.4 K/UL — HIGH (ref 1.8–7.4)
NEUTROPHILS NFR BLD AUTO: 81 % — HIGH (ref 43–77)
NON HDL CHOLESTEROL: 215 MG/DL — HIGH
NRBC # BLD: 0 /100 WBCS — SIGNIFICANT CHANGE UP (ref 0–0)
PHOSPHATE SERPL-MCNC: 1.3 MG/DL — LOW (ref 2.5–4.5)
PLATELET # BLD AUTO: 251 K/UL — SIGNIFICANT CHANGE UP (ref 150–400)
POTASSIUM SERPL-MCNC: 3.1 MMOL/L — LOW (ref 3.5–5.3)
POTASSIUM SERPL-MCNC: 3.2 MMOL/L — LOW (ref 3.5–5.3)
POTASSIUM SERPL-MCNC: 3.7 MMOL/L — SIGNIFICANT CHANGE UP (ref 3.5–5.3)
POTASSIUM SERPL-SCNC: 3.1 MMOL/L — LOW (ref 3.5–5.3)
POTASSIUM SERPL-SCNC: 3.2 MMOL/L — LOW (ref 3.5–5.3)
POTASSIUM SERPL-SCNC: 3.7 MMOL/L — SIGNIFICANT CHANGE UP (ref 3.5–5.3)
PROT SERPL-MCNC: 7.1 G/DL — SIGNIFICANT CHANGE UP (ref 6–8.3)
RBC # BLD: 4.64 M/UL — SIGNIFICANT CHANGE UP (ref 3.8–5.2)
RBC # FLD: 12.5 % — SIGNIFICANT CHANGE UP (ref 10.3–14.5)
SODIUM SERPL-SCNC: 134 MMOL/L — LOW (ref 135–145)
SODIUM SERPL-SCNC: 136 MMOL/L — SIGNIFICANT CHANGE UP (ref 135–145)
SODIUM SERPL-SCNC: 136 MMOL/L — SIGNIFICANT CHANGE UP (ref 135–145)
TRIGL SERPL-MCNC: 176 MG/DL — HIGH
TROPONIN I SERPL-MCNC: 0.11 NG/ML — HIGH (ref 0–0.04)
TROPONIN I SERPL-MCNC: 0.12 NG/ML — HIGH (ref 0–0.04)
TSH SERPL-MCNC: 1.44 UU/ML — SIGNIFICANT CHANGE UP (ref 0.34–4.82)
VIT B12 SERPL-MCNC: 488 PG/ML — SIGNIFICANT CHANGE UP (ref 232–1245)
VIT B12 SERPL-MCNC: 683 PG/ML — SIGNIFICANT CHANGE UP (ref 232–1245)
WBC # BLD: 10.37 K/UL — SIGNIFICANT CHANGE UP (ref 3.8–10.5)
WBC # FLD AUTO: 10.37 K/UL — SIGNIFICANT CHANGE UP (ref 3.8–10.5)

## 2020-12-31 PROCEDURE — 99222 1ST HOSP IP/OBS MODERATE 55: CPT

## 2020-12-31 RX ORDER — INSULIN GLARGINE 100 [IU]/ML
10 INJECTION, SOLUTION SUBCUTANEOUS AT BEDTIME
Refills: 0 | Status: DISCONTINUED | OUTPATIENT
Start: 2020-12-31 | End: 2021-01-06

## 2020-12-31 RX ORDER — INSULIN LISPRO 100/ML
3 VIAL (ML) SUBCUTANEOUS
Refills: 0 | Status: DISCONTINUED | OUTPATIENT
Start: 2020-12-31 | End: 2021-01-06

## 2020-12-31 RX ORDER — POTASSIUM CHLORIDE 20 MEQ
40 PACKET (EA) ORAL EVERY 4 HOURS
Refills: 0 | Status: DISCONTINUED | OUTPATIENT
Start: 2020-12-31 | End: 2020-12-31

## 2020-12-31 RX ORDER — ERGOCALCIFEROL 1.25 MG/1
50000 CAPSULE ORAL
Refills: 0 | Status: DISCONTINUED | OUTPATIENT
Start: 2020-12-31 | End: 2021-01-06

## 2020-12-31 RX ORDER — POTASSIUM PHOSPHATE, MONOBASIC POTASSIUM PHOSPHATE, DIBASIC 236; 224 MG/ML; MG/ML
15 INJECTION, SOLUTION INTRAVENOUS ONCE
Refills: 0 | Status: COMPLETED | OUTPATIENT
Start: 2020-12-31 | End: 2020-12-31

## 2020-12-31 RX ORDER — METOPROLOL TARTRATE 50 MG
2.5 TABLET ORAL ONCE
Refills: 0 | Status: COMPLETED | OUTPATIENT
Start: 2020-12-31 | End: 2020-12-31

## 2020-12-31 RX ORDER — ATORVASTATIN CALCIUM 80 MG/1
40 TABLET, FILM COATED ORAL AT BEDTIME
Refills: 0 | Status: DISCONTINUED | OUTPATIENT
Start: 2020-12-31 | End: 2021-01-06

## 2020-12-31 RX ORDER — SODIUM CHLORIDE 9 MG/ML
1000 INJECTION INTRAMUSCULAR; INTRAVENOUS; SUBCUTANEOUS
Refills: 0 | Status: COMPLETED | OUTPATIENT
Start: 2020-12-31 | End: 2021-01-01

## 2020-12-31 RX ORDER — AMLODIPINE BESYLATE 2.5 MG/1
5 TABLET ORAL ONCE
Refills: 0 | Status: COMPLETED | OUTPATIENT
Start: 2020-12-31 | End: 2020-12-31

## 2020-12-31 RX ORDER — ACETAMINOPHEN 500 MG
650 TABLET ORAL EVERY 6 HOURS
Refills: 0 | Status: DISCONTINUED | OUTPATIENT
Start: 2020-12-31 | End: 2021-01-02

## 2020-12-31 RX ORDER — TRAMADOL HYDROCHLORIDE 50 MG/1
25 TABLET ORAL EVERY 8 HOURS
Refills: 0 | Status: DISCONTINUED | OUTPATIENT
Start: 2020-12-31 | End: 2021-01-06

## 2020-12-31 RX ORDER — CYCLOBENZAPRINE HYDROCHLORIDE 10 MG/1
5 TABLET, FILM COATED ORAL THREE TIMES A DAY
Refills: 0 | Status: DISCONTINUED | OUTPATIENT
Start: 2020-12-31 | End: 2021-01-02

## 2020-12-31 RX ORDER — CHOLECALCIFEROL (VITAMIN D3) 125 MCG
50000 CAPSULE ORAL
Refills: 0 | Status: DISCONTINUED | OUTPATIENT
Start: 2020-12-31 | End: 2020-12-31

## 2020-12-31 RX ORDER — LOSARTAN POTASSIUM 100 MG/1
25 TABLET, FILM COATED ORAL DAILY
Refills: 0 | Status: DISCONTINUED | OUTPATIENT
Start: 2020-12-31 | End: 2021-01-06

## 2020-12-31 RX ORDER — AMLODIPINE BESYLATE 2.5 MG/1
10 TABLET ORAL DAILY
Refills: 0 | Status: DISCONTINUED | OUTPATIENT
Start: 2020-12-31 | End: 2020-12-31

## 2020-12-31 RX ORDER — PANTOPRAZOLE SODIUM 20 MG/1
40 TABLET, DELAYED RELEASE ORAL
Refills: 0 | Status: DISCONTINUED | OUTPATIENT
Start: 2020-12-31 | End: 2021-01-06

## 2020-12-31 RX ORDER — SODIUM CHLORIDE 9 MG/ML
1000 INJECTION INTRAMUSCULAR; INTRAVENOUS; SUBCUTANEOUS
Refills: 0 | Status: DISCONTINUED | OUTPATIENT
Start: 2020-12-31 | End: 2020-12-31

## 2020-12-31 RX ORDER — POLYETHYLENE GLYCOL 3350 17 G/17G
17 POWDER, FOR SOLUTION ORAL DAILY
Refills: 0 | Status: DISCONTINUED | OUTPATIENT
Start: 2020-12-31 | End: 2021-01-06

## 2020-12-31 RX ORDER — LABETALOL HCL 100 MG
100 TABLET ORAL
Refills: 0 | Status: DISCONTINUED | OUTPATIENT
Start: 2020-12-31 | End: 2021-01-06

## 2020-12-31 RX ADMIN — Medication 2: at 17:40

## 2020-12-31 RX ADMIN — Medication 2: at 08:11

## 2020-12-31 RX ADMIN — Medication 2.5 MILLIGRAM(S): at 06:57

## 2020-12-31 RX ADMIN — Medication 3 UNIT(S): at 17:40

## 2020-12-31 RX ADMIN — Medication 3: at 21:41

## 2020-12-31 RX ADMIN — AMLODIPINE BESYLATE 5 MILLIGRAM(S): 2.5 TABLET ORAL at 08:11

## 2020-12-31 RX ADMIN — SODIUM CHLORIDE 75 MILLILITER(S): 9 INJECTION INTRAMUSCULAR; INTRAVENOUS; SUBCUTANEOUS at 17:35

## 2020-12-31 RX ADMIN — Medication 3: at 12:11

## 2020-12-31 RX ADMIN — INSULIN GLARGINE 10 UNIT(S): 100 INJECTION, SOLUTION SUBCUTANEOUS at 21:42

## 2020-12-31 RX ADMIN — AMLODIPINE BESYLATE 5 MILLIGRAM(S): 2.5 TABLET ORAL at 05:36

## 2020-12-31 RX ADMIN — SODIUM CHLORIDE 75 MILLILITER(S): 9 INJECTION INTRAMUSCULAR; INTRAVENOUS; SUBCUTANEOUS at 02:13

## 2020-12-31 RX ADMIN — ATORVASTATIN CALCIUM 40 MILLIGRAM(S): 80 TABLET, FILM COATED ORAL at 21:41

## 2020-12-31 RX ADMIN — Medication 650 MILLIGRAM(S): at 13:00

## 2020-12-31 RX ADMIN — SODIUM CHLORIDE 75 MILLILITER(S): 9 INJECTION INTRAMUSCULAR; INTRAVENOUS; SUBCUTANEOUS at 21:42

## 2020-12-31 RX ADMIN — Medication 100 MILLIGRAM(S): at 12:10

## 2020-12-31 RX ADMIN — Medication 40 MILLIEQUIVALENT(S): at 05:36

## 2020-12-31 RX ADMIN — ENOXAPARIN SODIUM 40 MILLIGRAM(S): 100 INJECTION SUBCUTANEOUS at 12:12

## 2020-12-31 RX ADMIN — ERGOCALCIFEROL 50000 UNIT(S): 1.25 CAPSULE ORAL at 12:09

## 2020-12-31 RX ADMIN — Medication 650 MILLIGRAM(S): at 12:11

## 2020-12-31 RX ADMIN — Medication 100 MILLIGRAM(S): at 17:37

## 2020-12-31 RX ADMIN — POTASSIUM PHOSPHATE, MONOBASIC POTASSIUM PHOSPHATE, DIBASIC 62.5 MILLIMOLE(S): 236; 224 INJECTION, SOLUTION INTRAVENOUS at 12:10

## 2020-12-31 NOTE — CONSULT NOTE ADULT - ASSESSMENT
75 year old Female  with  PMHx of schizophrenia not on any meds,walks with a walker  presented to the ED after fall on 12/29 and generalized weakness,new onset Dm,uncontrolled HTN and abnormal EKG.  1.Tele monitoring.  2.DM-Insulin,Endo eval.  3.Echocardiogram.  4.HTN-d/c norvasc,add labetalol 100mg bid and cozaar 25mg qd.  5.Lipid d/o-statin.  6.GI and DVT prophylaxis.

## 2020-12-31 NOTE — CONSULT NOTE ADULT - SUBJECTIVE AND OBJECTIVE BOX
Patient is a 75y old  Female who presents with a chief complaint of Rhabdomyolysis (31 Dec 2020 10:11)      HPI:   75 year old Female  with  PMHx of scizophrenia not on any meds,walks with a walker  presented to the ED after fall yesterday 12/29 and generalized weakness. Patient admits that she was going to get the news paper then she slipped and wasnot able to stand and was left on the floor for almost a day until her super found her on floor . Patient reports no LOC, chest pain or shortness of breath.  Patient endorses pain to the hip, denies numbness, focal weakness. diarrhea, constipation or abdominal pain . she was admitted to the hospital 1 year ago for generalized weakness and was discharged on medications for HTN and schizophrenia that was diagnosed at that time but she didnot pick the medications and never went to a doctor since then . The patient vomited once while interviewing her,  (30 Dec 2020 15:34)      Endo is consulted for new diagnosis of DM.       PAST MEDICAL & SURGICAL HISTORY:  Schizophrenia    No significant past surgical history           MEDICATIONS  (STANDING):  atorvastatin 40 milliGRAM(s) Oral at bedtime  dextrose 40% Gel 15 Gram(s) Oral once  dextrose 5%. 1000 milliLiter(s) (50 mL/Hr) IV Continuous <Continuous>  dextrose 5%. 1000 milliLiter(s) (100 mL/Hr) IV Continuous <Continuous>  dextrose 50% Injectable 25 Gram(s) IV Push once  enoxaparin Injectable 40 milliGRAM(s) SubCutaneous daily  ergocalciferol 20528 Unit(s) Oral <User Schedule>  glucagon  Injectable 1 milliGRAM(s) IntraMuscular once  insulin lispro (ADMELOG) corrective regimen sliding scale   SubCutaneous Before meals and at bedtime  labetalol 100 milliGRAM(s) Oral two times a day  losartan 25 milliGRAM(s) Oral daily  pantoprazole    Tablet 40 milliGRAM(s) Oral before breakfast  potassium phosphate IVPB 15 milliMole(s) IV Intermittent once  sodium chloride 0.9%. 1000 milliLiter(s) (75 mL/Hr) IV Continuous <Continuous>    MEDICATIONS  (PRN):  acetaminophen   Tablet .. 650 milliGRAM(s) Oral every 6 hours PRN Temp greater or equal to 38C (100.4F), Mild Pain (1 - 3)  cyclobenzaprine 5 milliGRAM(s) Oral three times a day PRN Muscle Spasm  traMADol 25 milliGRAM(s) Oral every 8 hours PRN Severe Pain (7 - 10)      FAMILY HISTORY:  No pertinent family history in first degree relatives        SOCIAL HISTORY:      REVIEW OF SYSTEMS:  CONSTITUTIONAL: No fever, weight loss, or fatigue  EYES: No eye pain, visual disturbances, or discharge  ENT:  No difficulty hearing, tinnitus, vertigo; No sinus or throat pain  NECK: No pain or stiffness  RESPIRATORY: No cough, wheezing, chills or hemoptysis; No Shortness of Breath  CARDIOVASCULAR: No chest pain, palpitations, passing out, dizziness, or leg swelling  GASTROINTESTINAL: No abdominal or epigastric pain. No nausea, vomiting, or hematemesis; No diarrhea or constipation. No melena or hematochezia.  GENITOURINARY: No dysuria, frequency, hematuria, or incontinence  NEUROLOGICAL: No headaches, memory loss, loss of strength, numbness, or tremors  SKIN: No itching, burning, rashes, or lesions   LYMPH Nodes: No enlarged glands  ENDOCRINE: No heat or cold intolerance; No hair loss  MUSCULOSKELETAL: No joint pain or swelling; No muscle, back, or extremity pain  PSYCHIATRIC: No depression, anxiety, mood swings, or difficulty sleeping  HEME/LYMPH: No easy bruising, or bleeding gums  ALLERGY AND IMMUNOLOGIC: No hives or eczema	        Vital Signs Last 24 Hrs  T(C): 37.1 (31 Dec 2020 07:15), Max: 37.4 (30 Dec 2020 11:07)  T(F): 98.8 (31 Dec 2020 07:15), Max: 99.4 (30 Dec 2020 11:07)  HR: 71 (31 Dec 2020 07:15) (70 - 97)  BP: 179/69 (31 Dec 2020 07:15) (164/66 - 201/79)  BP(mean): --  RR: 18 (31 Dec 2020 07:15) (17 - 19)  SpO2: 97% (31 Dec 2020 07:15) (96% - 100%)      Constitutional:    NC/AT:    HEENT:    Neck:  No JVD, bruits or thyromegaly    Respiratory:  Clear without rales or rhonchi    Cardiovascular:  RR without murmur, rub or gallop.    Gastrointestinal: Soft without hepatosplenomegaly.    Extremities: without cyanosis, clubbing or edema.    Neurological:  Oriented   x   3   . No gross sensory or motor defects.        LABS:                        13.3   10.37 )-----------( 251      ( 31 Dec 2020 06:47 )             41.7     12-31    136  |  101  |  20<H>  ----------------------------<  240<H>  3.2<L>   |  21<L>  |  0.63    Ca    7.9<L>      31 Dec 2020 06:47  Phos  1.3     12-31  Mg     2.5     12-31    TPro  7.1  /  Alb  2.9<L>  /  TBili  0.7  /  DBili  x   /  AST  41<H>  /  ALT  42  /  AlkPhos  91  12-31    CARDIAC MARKERS ( 31 Dec 2020 06:47 )  0.108 ng/mL / x     / 1068 U/L / x     / x      CARDIAC MARKERS ( 31 Dec 2020 01:17 )  0.125 ng/mL / x     / 1048 U/L / x     / x      CARDIAC MARKERS ( 30 Dec 2020 19:30 )  0.116 ng/mL / x     / 1414 U/L / x     / x      CARDIAC MARKERS ( 30 Dec 2020 12:02 )  0.043 ng/mL / x     / 2416 U/L / x     / x          PT/INR - ( 30 Dec 2020 12:02 )   PT: 13.2 sec;   INR: 1.12 ratio         PTT - ( 30 Dec 2020 12:02 )  PTT:32.8 sec    CAPILLARY BLOOD GLUCOSE      POCT Blood Glucose.: 232 mg/dL (31 Dec 2020 07:59)  POCT Blood Glucose.: 259 mg/dL (30 Dec 2020 23:05)  POCT Blood Glucose.: 309 mg/dL (30 Dec 2020 11:19)

## 2020-12-31 NOTE — CONSULT NOTE ADULT - SUBJECTIVE AND OBJECTIVE BOX
CHIEF COMPLAINT:Patient is a 75y old  Female who presents with a chief complaint of fall.      HPI:   75 year old Female  with  PMHx of schizophrenia not on any meds,walks with a walker  presented to the ED after fall on 12/29 and generalized weakness. Patient admits that she was going to get the news paper then she slipped and was not able to stand and was left on the floor for almost a day until her super found her on floor . Patient reports no LOC, chest pain or shortness of breath.  Patient endorses pain to the hip, denies numbness, focal weakness. diarrhea, constipation or abdominal pain . she was admitted to the hospital 1 year ago for generalized weakness and was discharged on medications for HTN and schizophrenia that was diagnosed at that time but she did not pick the medications and never went to a doctor since then . The patient vomited once while interviewing her,  (30 Dec 2020 15:34)      PAST MEDICAL & SURGICAL HISTORY:  Schizophrenia    HTN        MEDICATIONS  (STANDING):  atorvastatin 40 milliGRAM(s) Oral at bedtime  dextrose 40% Gel 15 Gram(s) Oral once  dextrose 5%. 1000 milliLiter(s) (50 mL/Hr) IV Continuous <Continuous>  dextrose 5%. 1000 milliLiter(s) (100 mL/Hr) IV Continuous <Continuous>  dextrose 50% Injectable 25 Gram(s) IV Push once  enoxaparin Injectable 40 milliGRAM(s) SubCutaneous daily  ergocalciferol 18831 Unit(s) Oral <User Schedule>  glucagon  Injectable 1 milliGRAM(s) IntraMuscular once  insulin lispro (ADMELOG) corrective regimen sliding scale   SubCutaneous Before meals and at bedtime  labetalol 100 milliGRAM(s) Oral two times a day  losartan 25 milliGRAM(s) Oral daily  pantoprazole    Tablet 40 milliGRAM(s) Oral before breakfast  potassium phosphate IVPB 15 milliMole(s) IV Intermittent once  sodium chloride 0.9%. 1000 milliLiter(s) (75 mL/Hr) IV Continuous <Continuous>    MEDICATIONS  (PRN):  acetaminophen   Tablet .. 650 milliGRAM(s) Oral every 6 hours PRN Temp greater or equal to 38C (100.4F), Mild Pain (1 - 3)  cyclobenzaprine 5 milliGRAM(s) Oral three times a day PRN Muscle Spasm  traMADol 25 milliGRAM(s) Oral every 8 hours PRN Severe Pain (7 - 10)      FAMILY HISTORY:  No pertinent family history in first degree relatives        SOCIAL HISTORY:    [x ] Non-smoker    [x ] Alcohol-denies    Allergies    Allergy Status Unknown    Intolerances    	    REVIEW OF SYSTEMS:  CONSTITUTIONAL: No fever, weight loss, or fatigue  EYES: No eye pain, visual disturbances, or discharge  ENT:  No difficulty hearing, tinnitus, vertigo; No sinus or throat pain  NECK: No pain or stiffness  RESPIRATORY: No cough, wheezing, chills or hemoptysis; No Shortness of Breath  CARDIOVASCULAR: No chest pain, palpitations, passing out, dizziness, or leg swelling  GASTROINTESTINAL: No abdominal or epigastric pain. No nausea, vomiting, or hematemesis; No diarrhea or constipation. No melena or hematochezia.  GENITOURINARY: No dysuria, frequency, hematuria, or incontinence  NEUROLOGICAL: No headaches, memory loss, loss of strength, numbness, or tremors  SKIN: No itching, burning, rashes, or lesions   LYMPH Nodes: No enlarged glands  ENDOCRINE: No heat or cold intolerance; No hair loss  MUSCULOSKELETAL: No joint pain or swelling; No muscle, back, or extremity pain  PSYCHIATRIC: No depression, anxiety, mood swings, or difficulty sleeping  HEME/LYMPH: No easy bruising, or bleeding gums  ALLERGY AND IMMUNOLOGIC: No hives or eczema	        PHYSICAL EXAM:  T(C): 37.1 (12-31-20 @ 07:15), Max: 37.4 (12-30-20 @ 11:07)  HR: 71 (12-31-20 @ 07:15) (70 - 97)  BP: 179/69 (12-31-20 @ 07:15) (164/66 - 201/79)  RR: 18 (12-31-20 @ 07:15) (17 - 19)  SpO2: 97% (12-31-20 @ 07:15) (96% - 100%)  Wt(kg): --  I&O's Summary      Appearance: Normal	  HEENT:   Normal oral mucosa, PERRL, EOMI	  Lymphatic: No lymphadenopathy  Cardiovascular: Normal S1 S2, No JVD, No murmurs, No edema  Respiratory: Lungs clear to auscultation	  Psychiatry: A & O x 3, Mood & affect appropriate  Gastrointestinal:  Soft, Non-tender, + BS	  Skin: No rashes, No ecchymoses, No cyanosis	  Neurologic: Non-focal  Extremities: Normal range of motion, No clubbing, cyanosis or edema  Vascular: Peripheral pulses palpable 2+ bilaterally    	    ECG:  	NSR,lad,poor r wave progression    	  	  LABS:	 	      CARDIAC MARKERS ( 31 Dec 2020 06:47 )  0.108 ng/mL / x     / 1068 U/L / x     / x      CARDIAC MARKERS ( 31 Dec 2020 01:17 )  0.125 ng/mL / x     / 1048 U/L / x     / x      CARDIAC MARKERS ( 30 Dec 2020 19:30 )  0.116 ng/mL / x     / 1414 U/L / x     / x      CARDIAC MARKERS ( 30 Dec 2020 12:02 )  0.043 ng/mL / x     / 2416 U/L / x     / x                                  13.3   10.37 )-----------( 251      ( 31 Dec 2020 06:47 )             41.7     12-31    136  |  101  |  20<H>  ----------------------------<  240<H>  3.2<L>   |  21<L>  |  0.63    Ca    7.9<L>      31 Dec 2020 06:47  Phos  1.3     12-31  Mg     2.5     12-31    TPro  7.1  /  Alb  2.9<L>  /  TBili  0.7  /  DBili  x   /  AST  41<H>  /  ALT  42  /  AlkPhos  91  12-31    proBNP: Serum Pro-Brain Natriuretic Peptide: 3429 pg/mL (12-30 @ 19:30)    Lipid Profile: Cholesterol 256  LDL --  HDL 41    Cholesterol 255  LDL --  HDL 47      HgA1c: A1C with Estimated Average Glucose Result: 10.8  TSH: Thyroid Stimulating Hormone, Serum: 1.44 uU/mL (12-31 @ 06:47)  Thyroid Stimulating Hormone, Serum: 1.20 uU/mL (12-30 @ 19:30)        lt< from: US Duplex Venous Lower Ext Complete, Bilateral (12.30.20 @ 20:49) >  EXAM:  US DPLX LWR EXT VEINS COMPL BI                            PROCEDURE DATE:  12/30/2020          INTERPRETATION:  CLINICAL INFORMATION: Lower extremity pain    COMPARISON: None available.    TECHNIQUE: Duplex sonography of the BILATERAL LOWER extremity veins with color and spectral Doppler, with and without compression.    FINDINGS:    There is normal compressibility of the bilateral common femoral, femoral and popliteal veins.  Doppler examination shows normal spontaneous and phasic flow.    No calf vein thrombosis is detected.    IMPRESSION:  No evidence of deep venous thrombosis in either lower extremity.

## 2020-12-31 NOTE — PROGRESS NOTE ADULT - PROBLEM SELECTOR PLAN 4
-patient presented with high blood sugar level.  -started on sliding scale  -f/u HBA1c  - endo consulted Dr Wiley. - New onset DM  - Endo recommends Lantus 10 units at bedtime and Admelog 3 units before meals   - [employer] -180  - FS before meals and at bedtime  - Nutritionist consulted

## 2020-12-31 NOTE — CHART NOTE - NSCHARTNOTEFT_GEN_A_CORE
On call MD notified that patient's BP remains consistently high (180 SBP) even after AM BP meds. I stat dose of 2.5 Metoprolol ordered. Primary team to adjust and increase BP medication regimen.

## 2020-12-31 NOTE — CONSULT NOTE ADULT - ASSESSMENT
75 year old Female  with  PMHx of scizophrenia not on any meds,walks with a walker  presented to the ED after fall yesterday 12/29 and generalized weakness.    Endo is consulted for new diagnosis of DM.     1. New diagnosis of DM   A1c: 10.8   Lantus   Ademlog   TID pre meals with SS coverage.   Diabetic education   Pt educated on low carb diet, exercise  d/w hs      75 year old Female  with  PMHx of scizophrenia not on any meds,walks with a walker  presented to the ED after fall yesterday 12/29 and generalized weakness.    Endo is consulted for new diagnosis of DM.     1. New diagnosis of DM   A1c: 10.8   Lantus 10 units hs   Ademlog  3units  TID pre meals with SS coverage.   Diabetic education   Pt educated on low carb diet, exercise  d/w hs      75 year old Female  with  PMHx of scizophrenia not on any meds,walks with a walker  presented to the ED after fall yesterday 12/29 and generalized weakness.    Endo is consulted for new diagnosis of DM.     1. New diagnosis of DM   A1c: 10.8   Lantus 10 units hs   Ademlog  3units  TID pre meals with SS coverage.   Diabetic education   nutrition eval  d/w hs

## 2020-12-31 NOTE — PROGRESS NOTE ADULT - PROBLEM SELECTOR PLAN 1
- patient presented with a fall and was left on the floor for aday   - CK 2416   - started on aggressive hydration at 200 cc   - f/u u/s doppler LEs - patient presented with a fall and was left on the floor for a day   - On admission CK 2416, trending down   - S/p IVF   - f/u u/s doppler LEs - patient presented with a fall and was left on the floor for a day   - On admission CK 2416, trending down   - EKG NSR  - S/p IVF 1 L NS bolus   - C/w IVF hydration 75 cc/hr

## 2020-12-31 NOTE — DIETITIAN INITIAL EVALUATION ADULT. - PERTINENT LABORATORY DATA
12-31 Na136 mmol/L Glu 240 mg/dL<H> K+ 3.2 mmol/L<L> Cr  0.63 mg/dL BUN 20 mg/dL<H> 12-31 Phos 1.3 mg/dL<L> 12-31 Alb 2.9 g/dL<L> 12-31 Chol 256 mg/dL<H> LDL --    HDL 41 mg/dL<L> Trig 176 mg/dL<H>

## 2020-12-31 NOTE — DISCHARGE NOTE PROVIDER - CARE PROVIDER_API CALL
Selma Wiley  ENDOCRINOLOGY/METAB/DIABETES  [address] [place] [address]  Phone: [phone]  Fax: [phone]  Follow Up Time:     Guillermo Waggoner)  Internal Medicine  [address] [place] [address]  Phone: [phone]  Fax: [phone]  Follow Up Time:

## 2020-12-31 NOTE — PROGRESS NOTE ADULT - SUBJECTIVE AND OBJECTIVE BOX
Patient is a 75y old  Female who presents with a chief complaint of Rhabdomyolysis (30 Dec 2020 15:34)    pt seen in icu [  ], reg med floor [   ], bed [  ], chair at bedside [   ], a+o x3 [  ], lethargic [  ],  nad [  ]    lopez [  ], ngt [  ], peg [  ], et tube [  ], cent line [  ], picc line [  ]        Allergies    Allergy Status Unknown        Vitals    T(F): 98.1 (12-31-20 @ 04:59), Max: 99.4 (12-30-20 @ 11:07)  HR: 78 (12-31-20 @ 02:01) (70 - 97)  BP: 187/50 (12-31-20 @ 04:59) (164/66 - 201/79)  RR: 18 (12-31-20 @ 04:59) (17 - 19)  SpO2: 96% (12-31-20 @ 04:59) (96% - 100%)  Wt(kg): --  CAPILLARY BLOOD GLUCOSE      POCT Blood Glucose.: 259 mg/dL (30 Dec 2020 23:05)      Labs                          15.2   12.46 )-----------( 232      ( 30 Dec 2020 12:02 )             46.2       12-31    136  |  103  |  20<H>  ----------------------------<  246<H>  3.1<L>   |  17<L>  |  0.68    Ca    7.9<L>      31 Dec 2020 01:20  Phos  2.1     12-30  Mg     2.2     12-30    TPro  7.1  /  Alb  2.9<L>  /  TBili  0.7  /  DBili  x   /  AST  41<H>  /  ALT  42  /  AlkPhos  91  12-31      CARDIAC MARKERS ( 31 Dec 2020 01:17 )  0.125 ng/mL / x     / 1048 U/L / x     / x      CARDIAC MARKERS ( 30 Dec 2020 19:30 )  0.116 ng/mL / x     / 1414 U/L / x     / x      CARDIAC MARKERS ( 30 Dec 2020 12:02 )  0.043 ng/mL / x     / 2416 U/L / x     / x                Radiology Results      Meds    MEDICATIONS  (STANDING):  amLODIPine   Tablet 5 milliGRAM(s) Oral daily  dextrose 40% Gel 15 Gram(s) Oral once  dextrose 5%. 1000 milliLiter(s) (50 mL/Hr) IV Continuous <Continuous>  dextrose 5%. 1000 milliLiter(s) (100 mL/Hr) IV Continuous <Continuous>  dextrose 50% Injectable 25 Gram(s) IV Push once  dextrose 50% Injectable 12.5 Gram(s) IV Push once  dextrose 50% Injectable 25 Gram(s) IV Push once  enoxaparin Injectable 40 milliGRAM(s) SubCutaneous daily  glucagon  Injectable 1 milliGRAM(s) IntraMuscular once  insulin lispro (ADMELOG) corrective regimen sliding scale   SubCutaneous Before meals and at bedtime  potassium chloride    Tablet ER 40 milliEquivalent(s) Oral every 4 hours  sodium chloride 0.9%. 1000 milliLiter(s) (75 mL/Hr) IV Continuous <Continuous>      MEDICATIONS  (PRN):      Physical Exam    Neuro :  no focal deficits  Respiratory: CTA B/L  CV: RRR, S1S2, no murmurs,   Abdominal: Soft, NT, ND +BS,  Extremities: No edema, + peripheral pulses    ASSESSMENT    Rhabdomyolysis    Schizophrenia    No significant past surgical history        PLAN     Patient is a 75y old  Female who presents with a chief complaint of Rhabdomyolysis (30 Dec 2020 15:34)    pt seen in tele [ x ], reg med floor [   ], bed [x  ], chair at bedside [   ], a+o x3 [ x ], lethargic [  ],  nad [x  ]        Allergies    Allergy Status Unknown        Vitals    T(F): 98.1 (12-31-20 @ 04:59), Max: 99.4 (12-30-20 @ 11:07)  HR: 78 (12-31-20 @ 02:01) (70 - 97)  BP: 187/50 (12-31-20 @ 04:59) (164/66 - 201/79)  RR: 18 (12-31-20 @ 04:59) (17 - 19)  SpO2: 96% (12-31-20 @ 04:59) (96% - 100%)  Wt(kg): --  CAPILLARY BLOOD GLUCOSE      POCT Blood Glucose.: 259 mg/dL (30 Dec 2020 23:05)      Labs                          15.2   12.46 )-----------( 232      ( 30 Dec 2020 12:02 )             46.2       12-31    136  |  103  |  20<H>  ----------------------------<  246<H>  3.1<L>   |  17<L>  |  0.68    Ca    7.9<L>      31 Dec 2020 01:20  Phos  2.1     12-30  Mg     2.2     12-30    TPro  7.1  /  Alb  2.9<L>  /  TBili  0.7  /  DBili  x   /  AST  41<H>  /  ALT  42  /  AlkPhos  91  12-31      CARDIAC MARKERS ( 31 Dec 2020 01:17 )  0.125 ng/mL / x     / 1048 U/L / x     / x      CARDIAC MARKERS ( 30 Dec 2020 19:30 )  0.116 ng/mL / x     / 1414 U/L / x     / x      CARDIAC MARKERS ( 30 Dec 2020 12:02 )  0.043 ng/mL / x     / 2416 U/L / x     / x                Radiology Results      Meds    MEDICATIONS  (STANDING):  amLODIPine   Tablet 5 milliGRAM(s) Oral daily  dextrose 40% Gel 15 Gram(s) Oral once  dextrose 5%. 1000 milliLiter(s) (50 mL/Hr) IV Continuous <Continuous>  dextrose 5%. 1000 milliLiter(s) (100 mL/Hr) IV Continuous <Continuous>  dextrose 50% Injectable 25 Gram(s) IV Push once  dextrose 50% Injectable 12.5 Gram(s) IV Push once  dextrose 50% Injectable 25 Gram(s) IV Push once  enoxaparin Injectable 40 milliGRAM(s) SubCutaneous daily  glucagon  Injectable 1 milliGRAM(s) IntraMuscular once  insulin lispro (ADMELOG) corrective regimen sliding scale   SubCutaneous Before meals and at bedtime  potassium chloride    Tablet ER 40 milliEquivalent(s) Oral every 4 hours  sodium chloride 0.9%. 1000 milliLiter(s) (75 mL/Hr) IV Continuous <Continuous>      MEDICATIONS  (PRN):      Physical Exam    Neuro :  no focal deficits  Respiratory: CTA B/L  CV: RRR, S1S2, no murmurs,   Abdominal: Soft, NT, ND +BS,  Extremities: No edema, + peripheral pulses    ASSESSMENT      s/p fall  Rhabdomyolysis   uncontrolled htn  r/o acs  hyperglycemia   r/o dm   myalgia   hypokalemia  h/o Schizophrenia        PLAN      cont tele,   acs protocol,   trop x 3 noted above  start labetalol, aspirin, statin,   cardio cons  f/u echo  f/u ck  cont  ivf,   phys tx eval  f/u hgba1c  cont hss  endo cons  phys tx eval  pain mgmt eval  add flexeril   supplement potassium as needed  cont current meds

## 2020-12-31 NOTE — PROGRESS NOTE ADULT - PROBLEM SELECTOR PLAN 6
RISK                                                          Points  [] Previous VTE                                           3  [] Thrombophilia                                        2  [] Lower limb paralysis                              2   [] Current Cancer                                       2   [x] Immobilization > 24 hrs                        1  [] ICU/CCU stay > 24 hours                       1  [x] Age > 60                                                   1  score 2   on levonox - Low potassium and low phosphorus  - IV replacement  - F/u repeat BMP

## 2020-12-31 NOTE — PROGRESS NOTE ADULT - ASSESSMENT
75 year old Female with no PMHx walks with a walker presented to the ED after fall, admitted to tele for rhabdomyolysis  75 year old Female from home walks w/ walker has PMHx of schizophrenia on no meds, HTN walks presented to ED after fall, admitted to tele for rhabdomyolysis

## 2020-12-31 NOTE — PHYSICAL THERAPY INITIAL EVALUATION ADULT - BED MOBILITY LIMITATIONS, REHAB EVAL
reports a modified Misael RPE of 8 when performing bed mobility to supine-sit task/decreased ability to use arms for pushing/pulling/decreased ability to use legs for bridging/pushing/impaired ability to control trunk for mobility

## 2020-12-31 NOTE — CONSULT NOTE ADULT - SUBJECTIVE AND OBJECTIVE BOX
Source of information: , Chart review, patient  Patient language: English  : n/a    CC: Patient is a 75y old  Female who presents with a chief complaint of Rhabdomyolysis (31 Dec 2020 15:48)      HPI:   75 year old Female  with  PMHx of scizophrenia not on any meds,walks with a walker  presented to the ED after fall yesterday 12/29 and generalized weakness. Patient admits that she was going to get the news paper then she slipped and wasnot able to stand and was left on the floor for almost a day until her super found her on floor . Patient reports no LOC, chest pain or shortness of breath.  Patient endorses pain to the hip, denies numbness, focal weakness. diarrhea, constipation or abdominal pain . she was admitted to the hospital 1 year ago for generalized weakness and was discharged on medications for HTN and schizophrenia that was diagnosed at that time but she didnot pick the medications and never went to a doctor since then . The patient vomited once while interviewing her,  (30 Dec 2020 15:34)      Patient stated goal for pain control: to be able to take deep breaths, utilize incentive spirometer, get out of bed to chair and ambulate with tolerable pain control.     PAIN SCORE:       SCALE USED: (1-10 VNRS)    PAST MEDICAL & SURGICAL HISTORY:  Schizophrenia    No significant past surgical history        FAMILY HISTORY:  No pertinent family history in first degree relatives          SOCIAL HISTORY:  [ ] Denies Smoking, Alcohol, or Drug Use    Allergies    Allergy Status Unknown    Intolerances        MEDICATIONS:    MEDICATIONS  (STANDING):  atorvastatin 40 milliGRAM(s) Oral at bedtime  dextrose 40% Gel 15 Gram(s) Oral once  dextrose 5%. 1000 milliLiter(s) (50 mL/Hr) IV Continuous <Continuous>  dextrose 5%. 1000 milliLiter(s) (100 mL/Hr) IV Continuous <Continuous>  dextrose 50% Injectable 25 Gram(s) IV Push once  enoxaparin Injectable 40 milliGRAM(s) SubCutaneous daily  ergocalciferol 35476 Unit(s) Oral <User Schedule>  glucagon  Injectable 1 milliGRAM(s) IntraMuscular once  insulin glargine Injectable (LANTUS) 10 Unit(s) SubCutaneous at bedtime  insulin lispro (ADMELOG) corrective regimen sliding scale   SubCutaneous Before meals and at bedtime  insulin lispro Injectable (ADMELOG) 3 Unit(s) SubCutaneous three times a day before meals  labetalol 100 milliGRAM(s) Oral two times a day  losartan 25 milliGRAM(s) Oral daily  pantoprazole    Tablet 40 milliGRAM(s) Oral before breakfast  sodium chloride 0.9%. 1000 milliLiter(s) (75 mL/Hr) IV Continuous <Continuous>    MEDICATIONS  (PRN):  acetaminophen   Tablet .. 650 milliGRAM(s) Oral every 6 hours PRN Temp greater or equal to 38C (100.4F), Mild Pain (1 - 3)  cyclobenzaprine 5 milliGRAM(s) Oral three times a day PRN Muscle Spasm  traMADol 25 milliGRAM(s) Oral every 8 hours PRN Severe Pain (7 - 10)      Vital Signs Last 24 Hrs  T(C): 36.5 (31 Dec 2020 16:13), Max: 37.5 (31 Dec 2020 11:03)  T(F): 97.7 (31 Dec 2020 16:13), Max: 99.5 (31 Dec 2020 11:03)  HR: 63 (31 Dec 2020 16:13) (63 - 97)  BP: 131/38 (31 Dec 2020 16:13) (131/38 - 189/73)  BP(mean): 76 (31 Dec 2020 11:04) (76 - 76)  RR: 18 (31 Dec 2020 16:13) (18 - 19)  SpO2: 93% (31 Dec 2020 16:13) (93% - 100%)    LABS:                          13.3   10.37 )-----------( 251      ( 31 Dec 2020 06:47 )             41.7     12-31    134<L>  |  102  |  24<H>  ----------------------------<  259<H>  3.7   |  22  |  0.68    Ca    7.8<L>      31 Dec 2020 15:37  Phos  1.3     12-31  Mg     2.5     12-31    TPro  7.1  /  Alb  2.9<L>  /  TBili  0.7  /  DBili  x   /  AST  41<H>  /  ALT  42  /  AlkPhos  91  12-31    PT/INR - ( 30 Dec 2020 12:02 )   PT: 13.2 sec;   INR: 1.12 ratio         PTT - ( 30 Dec 2020 12:02 )  PTT:32.8 sec  LIVER FUNCTIONS - ( 31 Dec 2020 01:20 )  Alb: 2.9 g/dL / Pro: 7.1 g/dL / ALK PHOS: 91 U/L / ALT: 42 U/L DA / AST: 41 U/L / GGT: x             CAPILLARY BLOOD GLUCOSE      POCT Blood Glucose.: 262 mg/dL (31 Dec 2020 11:29)  POCT Blood Glucose.: 232 mg/dL (31 Dec 2020 07:59)  POCT Blood Glucose.: 259 mg/dL (30 Dec 2020 23:05)      REVIEW OF SYSTEMS:  CONSTITUTIONAL: No fever or fatigue  RESPIRATORY: No cough, wheezing, chills or hemoptysis; No shortness of breath  CARDIOVASCULAR: No chest pain, palpitations, dizziness, or leg swelling  GASTROINTESTINAL: No abdominal or epigastric pain. No nausea, vomiting; No diarrhea or constipation.   GENITOURINARY: No dysuria, frequency, hematuria, retention or incontinence  MUSCULOSKELETAL: No joint pain or swelling; No muscle, back, or extremity pain, no upper or lower motor strength weakness, no saddle anesthesia, bowel/bladder incontinence, no falls   NEURO: No weakness, no numbness   PSYCHIATRIC: No depression, anxiety, mood swings, or difficulty sleeping    PHYSICAL EXAM:  GENERAL:  Alert & Oriented X3, NAD, Good concentration  CHEST/LUNG: Clear to auscultation bilaterally; No rales, rhonchi, wheezing, or rubs  HEART: Regular rate and rhythm; No murmurs, rubs, or gallops  ABDOMEN: Soft, Nontender, Nondistended; Bowel sounds present  : no incontinence, no flank pain  EXTREMITIES:  2+ Peripheral Pulses, No cyanosis, or edema  MUSCULOSKELETAL: Motor Strength 5/5 B/L upper and lower extremities; moves all extremities equally against gravity; ROM intact; negative SRL  NEUROLOGICAL: awake and alert and oriented   SKIN: No rashes or lesions    Radiology:    Drug Screen:  enoxaparin Injectable 40 milliGRAM(s) SubCutaneous daily          ORT Score   Family Hx of substance abuse	Female	Male  Alcohol 	                                              1              3  Illegal drugs	                                      2              3  Rx drugs                                               4	      4    Personal Hx of substance abuse		  Alcohol 	                                               3	      3  Illegal drugs                                  	       4	      4  Rx drugs                                                5	      5  Age between 16- 45 years	               1             1  hx preadolescent sexual abuse	       3	      0  Psychological disease		  ADD, OCD, bipolar, schizophrenia	2	      2  Depression                                    	1	      1  Score totals 		  		  a score of 3 or lower indicates low risk for opioid abuse		  a score of 4-7 indicates moderate risk for opioid abuse		  a score of 8 or higher indicates high risk for opioid abuse	    Risk factors associated with adverse outcomes related to opioid treatment  [ ]  Concurrent benzodiazepine use  [ ]  History/ Active substance use or alcohol use disorder  [ ] Psychiatric co-morbidity  [ ] Sleep apnea  [ ] COPD  [ ] BMI> 35  [ ] Liver dysfunction  [ ] Renal dysfunction  [ ] CHF  [ ] Smoker  [ ]  Age > 60 years      [ ]  [place]  Reviewed and Copied to Chart. See below.           Source of information: , Chart review, patient  Patient language: English  : n/a    CC: Patient is a 75y old  Female who presents with a chief complaint of Rhabdomyolysis (31 Dec 2020 15:48)      HPI:   75 year old Female  with  PMHx of scizophrenia not on any meds,walks with a walker  presented to the ED after fall yesterday 12/29 and generalized weakness. Patient admits that she was going to get the news paper then she slipped and wasnot able to stand and was left on the floor for almost a day until her super found her on floor . Patient reports no LOC, chest pain or shortness of breath.  Patient endorses pain to the hip, denies numbness, focal weakness. diarrhea, constipation or abdominal pain . she was admitted to the hospital 1 year ago for generalized weakness and was discharged on medications for HTN and schizophrenia that was diagnosed at that time but she didnot pick the medications and never went to a doctor since then . The patient vomited once while interviewing her,  (30 Dec 2020 15:34)      Pt with generalized body pain and weakness.  pt states that she feels much better today.  Pt s/p fall and was on floor for some time. No chest pain or sob.  Pt was sleeping during interview.     PAIN SCORE: 3/10      SCALE USED: (1-10 VNRS)    PAST MEDICAL & SURGICAL HISTORY:  Schizophrenia    No significant past surgical history        FAMILY HISTORY:  No pertinent family history in first degree relatives          SOCIAL HISTORY:  [x ] Denies Smoking, Alcohol, or Drug Use    Allergies    Allergy Status Unknown    Intolerances        MEDICATIONS:    MEDICATIONS  (STANDING):  atorvastatin 40 milliGRAM(s) Oral at bedtime  dextrose 40% Gel 15 Gram(s) Oral once  dextrose 5%. 1000 milliLiter(s) (50 mL/Hr) IV Continuous <Continuous>  dextrose 5%. 1000 milliLiter(s) (100 mL/Hr) IV Continuous <Continuous>  dextrose 50% Injectable 25 Gram(s) IV Push once  enoxaparin Injectable 40 milliGRAM(s) SubCutaneous daily  ergocalciferol 60380 Unit(s) Oral <User Schedule>  glucagon  Injectable 1 milliGRAM(s) IntraMuscular once  insulin glargine Injectable (LANTUS) 10 Unit(s) SubCutaneous at bedtime  insulin lispro (ADMELOG) corrective regimen sliding scale   SubCutaneous Before meals and at bedtime  insulin lispro Injectable (ADMELOG) 3 Unit(s) SubCutaneous three times a day before meals  labetalol 100 milliGRAM(s) Oral two times a day  losartan 25 milliGRAM(s) Oral daily  pantoprazole    Tablet 40 milliGRAM(s) Oral before breakfast  sodium chloride 0.9%. 1000 milliLiter(s) (75 mL/Hr) IV Continuous <Continuous>    MEDICATIONS  (PRN):  acetaminophen   Tablet .. 650 milliGRAM(s) Oral every 6 hours PRN Temp greater or equal to 38C (100.4F), Mild Pain (1 - 3)  cyclobenzaprine 5 milliGRAM(s) Oral three times a day PRN Muscle Spasm  traMADol 25 milliGRAM(s) Oral every 8 hours PRN Severe Pain (7 - 10)      Vital Signs Last 24 Hrs  T(C): 36.5 (31 Dec 2020 16:13), Max: 37.5 (31 Dec 2020 11:03)  T(F): 97.7 (31 Dec 2020 16:13), Max: 99.5 (31 Dec 2020 11:03)  HR: 63 (31 Dec 2020 16:13) (63 - 97)  BP: 131/38 (31 Dec 2020 16:13) (131/38 - 189/73)  BP(mean): 76 (31 Dec 2020 11:04) (76 - 76)  RR: 18 (31 Dec 2020 16:13) (18 - 19)  SpO2: 93% (31 Dec 2020 16:13) (93% - 100%)    LABS:                          13.3   10.37 )-----------( 251      ( 31 Dec 2020 06:47 )             41.7     12-31    134<L>  |  102  |  24<H>  ----------------------------<  259<H>  3.7   |  22  |  0.68    Ca    7.8<L>      31 Dec 2020 15:37  Phos  1.3     12-31  Mg     2.5     12-31    TPro  7.1  /  Alb  2.9<L>  /  TBili  0.7  /  DBili  x   /  AST  41<H>  /  ALT  42  /  AlkPhos  91  12-31    PT/INR - ( 30 Dec 2020 12:02 )   PT: 13.2 sec;   INR: 1.12 ratio         PTT - ( 30 Dec 2020 12:02 )  PTT:32.8 sec  LIVER FUNCTIONS - ( 31 Dec 2020 01:20 )  Alb: 2.9 g/dL / Pro: 7.1 g/dL / ALK PHOS: 91 U/L / ALT: 42 U/L DA / AST: 41 U/L / GGT: x             CAPILLARY BLOOD GLUCOSE      POCT Blood Glucose.: 262 mg/dL (31 Dec 2020 11:29)  POCT Blood Glucose.: 232 mg/dL (31 Dec 2020 07:59)  POCT Blood Glucose.: 259 mg/dL (30 Dec 2020 23:05)      REVIEW OF SYSTEMS:  CONSTITUTIONAL: No fever or fatigue  RESPIRATORY: No cough, wheezing, chills or hemoptysis; No shortness of breath  CARDIOVASCULAR: No chest pain, palpitations, dizziness, or leg swelling  GASTROINTESTINAL: No abdominal or epigastric pain. No nausea, vomiting; No diarrhea or constipation.   GENITOURINARY: No dysuria, frequency, hematuria, retention or incontinence  MUSCULOSKELETAL: + generalized body aches  NEURO: No weakness, no numbness   PSYCHIATRIC: + schizophrenia    PHYSICAL EXAM:  GENERAL:  Alert & Oriented X3, NAD drowsy   CHEST/LUNG: decreased breath sounds  HEART: Regular rate and rhythm; No murmurs, rubs, or gallops  ABDOMEN: Soft, Nontender, Nondistended; Bowel sounds present  : no incontinence, no flank pain  EXTREMITIES:  2+ Peripheral Pulses, No cyanosis, or edema  MUSCULOSKELETAL: + decreased rom   NEUROLOGICAL: awake and alert and oriented       Radiology:    Drug Screen:  enoxaparin Injectable 40 milliGRAM(s) SubCutaneous daily          ORT Score   Family Hx of substance abuse	Female	Male  Alcohol 	                                              1              3  Illegal drugs	                                      2              3  Rx drugs                                               4	      4    Personal Hx of substance abuse		  Alcohol 	                                               3	      3  Illegal drugs                                  	       4	      4  Rx drugs                                                5	      5  Age between 16- 45 years	               1             1  hx preadolescent sexual abuse	       3	      0  Psychological disease		  ADD, OCD, bipolar, schizophrenia	2	      2  Depression                                    	1	      1  Score totals 		  		  a score of 3 or lower indicates low risk for opioid abuse		  a score of 4-7 indicates moderate risk for opioid abuse		  a score of 8 or higher indicates high risk for opioid abuse	    Risk factors associated with adverse outcomes related to opioid treatment  [ ]  Concurrent benzodiazepine use  [x ]  History/ Active substance use or alcohol use disorder  [ ] Psychiatric co-morbidity  [ ] Sleep apnea  [ ] COPD  [ ] BMI> 35  [ ] Liver dysfunction  [ ] Renal dysfunction  [ ] CHF  [ ] Smoker  [x ]  Age > 60 years      [ x]  [place]  Reviewed and Copied to Chart. See below.

## 2020-12-31 NOTE — PROGRESS NOTE ADULT - PROBLEM SELECTOR PLAN 2
- patient presented with a fall and was left on the floor for aday   - CK 2416   - started on aggressive hydration at 200 cc   - f/u u/s doppler LEs  - hip xray showed no bone pathology   -CT head no acute event  -f/u echo   - f/u PT eval - Patient presented with a fall   - CK 2416 trending down   - C/w IVF   - US doppler LE negative DVT   - Bilateral hip x ray showed no bone pathology   - CT head no acute event  - CT cervical spine no fractures   - Tylenol, tramadol and flexeril for pain management   - F/u echo   - PT eval

## 2020-12-31 NOTE — PHYSICAL THERAPY INITIAL EVALUATION ADULT - PATIENT/FAMILY/SIGNIFICANT OTHER GOALS STATEMENT, PT EVAL
return home; ambulate, transfer, and perform ADLs independently and without difficulty using a rolling walker

## 2020-12-31 NOTE — DIETITIAN INITIAL EVALUATION ADULT. - PROBLEM SELECTOR PLAN 4
-patient presented with high blood sugar level.  -started on sliding scale  -f/u HBA1c  - endo consulted Dr Wiley.

## 2020-12-31 NOTE — PHYSICAL THERAPY INITIAL EVALUATION ADULT - ACTIVE RANGE OF MOTION EXAMINATION, REHAB EVAL
bilateral shoulder AROM flexion 0-170 degrees/no Active ROM deficits were identified/deficits as listed below

## 2020-12-31 NOTE — DIETITIAN INITIAL EVALUATION ADULT. - OTHER INFO
pt admitted after fall. pt visited, confused at times. reports ok appetite at home, eating mainly pureed foods past few months while awaiting dental repair. MD notified to change diet to puree. pt states UBW ~155#. bed scale indicated 159#. A1C noted 10.8; new onset. edu deferred given cognitive limitations. followed by endo. pt pending PT eval; may require placement.

## 2020-12-31 NOTE — DISCHARGE NOTE PROVIDER - NSDCCPCAREPLAN_GEN_ALL_CORE_FT
PRINCIPAL DISCHARGE DIAGNOSIS  Diagnosis: Non-traumatic rhabdomyolysis  Assessment and Plan of Treatment:       SECONDARY DISCHARGE DIAGNOSES  Diagnosis: Fall  Assessment and Plan of Treatment: Fall    Diagnosis: Electrolyte abnormality  Assessment and Plan of Treatment: Electrolyte abnormality    Diagnosis: Diabetes mellitus  Assessment and Plan of Treatment: Diabetes mellitus    Diagnosis: Hyperlipidemia  Assessment and Plan of Treatment: Hyperlipidemia     PRINCIPAL DISCHARGE DIAGNOSIS  Diagnosis: Rhabdomyolysis  Assessment and Plan of Treatment:       SECONDARY DISCHARGE DIAGNOSES  Diagnosis: Fall  Assessment and Plan of Treatment: Fall    Diagnosis: Diabetes mellitus  Assessment and Plan of Treatment: You have a history of diabetes.   Your HbA1c was 10.6 during this admission.  You need to continue monitoring your blood sugar levels closely and maintain healthy lifestyle by eating healthy diabetic regimen, weight loss and exercise regularly as tolerated.  Please continue to take your medications as prescribed.   Please follow up with your PCP/Endocrinologist within a week of discharge.      Diagnosis: Hyperlipidemia  Assessment and Plan of Treatment: You were diagnosed with Hyperlipidemia.     PRINCIPAL DISCHARGE DIAGNOSIS  Diagnosis: Rhabdomyolysis  Assessment and Plan of Treatment: You came to the hospital after a mechanical fall. You were found to have elevated creatine kinase. You were started on fluids until your creatine kinase levels decreased.  Please follow up with your PCP in a week from discharge.      SECONDARY DISCHARGE DIAGNOSES  Diagnosis: Diabetes mellitus  Assessment and Plan of Treatment: You have been diagnosed with new onset diabetes.  Your HbA1c was 10.6 during this admission.  You need to continue monitoring your blood sugar levels closely and maintain healthy lifestyle by eating healthy diabetic regimen, weight loss and exercise regularly as tolerated.  You were seen by endocrinologist who recommended insulin. Please continue to take insulin as prescribed and follow up with your PCP/Endocrinologist within a week of discharge.      Diagnosis: Fall  Assessment and Plan of Treatment: You came after a mechanical fall. Your CT head was negative, bilateral hip x ray negative for fractures, CT spine negative for fractures, doppler of lower extremities was negative for clots.   Please follow up with PCP in a week from discharge.      Diagnosis: Hyperlipidemia  Assessment and Plan of Treatment: You were diagnosed with Hyperlipidemia which is elevated fat levels in your blood. Please take Atorvastatin as prescribed and follow up with PCP in a week from discharge.    Diagnosis: Hypovitaminosis  Assessment and Plan of Treatment: You were diagnosed with low vitamin D levels. Please continue to take vitamin D as prescribed and follow up with PCP in a week from discharge.     PRINCIPAL DISCHARGE DIAGNOSIS  Diagnosis: Rhabdomyolysis  Assessment and Plan of Treatment: You came to the hospital after a mechanical fall. You were found to have elevated creatine kinase which is secondary to muscle breakdown and dehydration. You were started on fluids until your creatine kinase levels decreased.   Please follow up with your PCP in a week from discharge.      SECONDARY DISCHARGE DIAGNOSES  Diagnosis: Diabetes mellitus  Assessment and Plan of Treatment: You have been diagnosed with new onset diabetes.  Your HbA1c was 10.6 during this admission.  You need to continue monitoring your blood sugar levels closely and maintain healthy lifestyle by eating healthy diabetic regimen, weight loss and exercise regularly as tolerated.  You were seen by endocrinologist who recommended insulin. Please continue to take insulin as prescribed and follow up with your PCP/Endocrinologist within a week of discharge.      Diagnosis: Fall  Assessment and Plan of Treatment: You came after a mechanical fall. Your CT head was negative, bilateral hip x ray negative for fractures, CT spine negative for fractures, doppler of lower extremities was negative for clots, EKG normal. You were on pain medications for pain management.  Please follow up with PCP in a week from discharge.      Diagnosis: Hyperlipidemia  Assessment and Plan of Treatment: You were diagnosed with Hyperlipidemia which is elevated fat levels in your blood. Please take Atorvastatin as prescribed and follow up with PCP in a week from discharge.    Diagnosis: Hypertension  Assessment and Plan of Treatment: You have a history of Hypertension.  On this admission, your Blood Pressure was adequately controlled with Labetalol and Losartan.   Your blood pressure target is 120-140/80-90, maintain healthy lifestyle, low salt diet, avoid fatty food, weight loss, exercise regularly as tolerated 30 mins X 3 times per week.  Notify your doctor if you have any of the following symptoms:   (Dizziness, Lightheadedness, Blurry vision, Headache, Chest pain, Shortness of breath.)  Please continue to take medications as prescribed and follow-up with your PCP in 1 week from discharge.      Diagnosis: Hypovitaminosis  Assessment and Plan of Treatment: You were diagnosed with low vitamin D levels. Please continue to take vitamin D as prescribed and follow up with PCP in a week from discharge.     PRINCIPAL DISCHARGE DIAGNOSIS  Diagnosis: Rhabdomyolysis  Assessment and Plan of Treatment: You came to the hospital after a mechanical fall. You were found to have elevated creatine kinase which is secondary to muscle breakdown and dehydration. You were started on fluids until your creatine kinase levels decreased and normalized.   You were monitored on Telemetry floor, no cardiac events.  Please follow up with your PCP in a week from discharge.      SECONDARY DISCHARGE DIAGNOSES  Diagnosis: Diabetes mellitus  Assessment and Plan of Treatment: You have been diagnosed with new onset diabetes.  Your HbA1c was 10.6 during this admission.  You need to continue monitoring your blood sugar levels closely and maintain healthy lifestyle by eating healthy diabetic regimen, weight loss and exercise regularly as tolerated.  You were seen by endocrinologist who recommended insulin and also given recommendations by nutritionist. Please continue to take insulin as prescribed and follow up with your PCP/Endocrinologist within a week of discharge.      Diagnosis: Fall  Assessment and Plan of Treatment: You came after a mechanical fall. Your CT head was negative, bilateral hip x ray negative for fractures, CT spine negative for fractures, doppler of lower extremities was negative for clots, EKG normal. You were on pain medications for pain management.  Please follow up with PCP in a week from discharge.      Diagnosis: Anemia  Assessment and Plan of Treatment: You were found to have hemoglobin drop from 13 to 10. CT abdomen pelvis showed XXXX, low iron, no active bleeding.   Please follow up PCP in a week from discharge.    Diagnosis: Hyperlipidemia  Assessment and Plan of Treatment: You were diagnosed with Hyperlipidemia which is elevated fat levels in your blood. Please take Atorvastatin as prescribed and follow up with PCP in a week from discharge.    Diagnosis: Hypertension  Assessment and Plan of Treatment: You have a history of Hypertension.  On this admission, your Blood Pressure was adequately controlled with Labetalol and Losartan.   Your blood pressure target is 120-140/80-90, maintain healthy lifestyle, low salt diet, avoid fatty food, weight loss, exercise regularly as tolerated 30 mins X 3 times per week.  Notify your doctor if you have any of the following symptoms:   (Dizziness, Lightheadedness, Blurry vision, Headache, Chest pain, Shortness of breath.)  Please continue to take medications as prescribed and follow-up with your PCP in 1 week from discharge.      Diagnosis: Hypovitaminosis  Assessment and Plan of Treatment: You were diagnosed with low vitamin D levels. Please continue to take vitamin D as prescribed and follow up with PCP in a week from discharge.

## 2020-12-31 NOTE — DISCHARGE NOTE PROVIDER - HOSPITAL COURSE
75 year old Female with PMHx of schizophrenia not on any meds, HTN, walks with a walker  presented to the ED after fall on 12/29 and generalized weakness. Patient admits that she was going to get the news paper then she slipped and was not able to stand up by herself, staying on the floor for almost a day until her super found her on floor. Patient reports no LOC, chest pain or shortness of breath. Patient endorses pain to the hip, denies numbness, focal weakness, diarrhea, constipation or abdominal pain, respiratory or urinary symptoms.  Patient admitted for fall and rhabdomyolysis   On admission, CK 2416, started on IVF, trending down, EKG NSR, A1c 10.8 endo on board, vitamin D low, started on oral supplements, abnormal high lipid profile, started on Statin, CTH negative, CT cervical spine no fractures, doppler LE negative for DVT, CXR negative for active pathology.   BP has been around 180s, was on amlodipine, changed by cardio to labetalol and losartan.   Patient with low potassium and phosphorus replaced as needed.    Pending Echo, nutritionist and PT evaluation. 75 year old Female with PMHx of schizophrenia not on any meds, HTN, walks with a walker  presented to the ED after fall on 12/29 and generalized weakness. Patient admits that she was going to get the news paper then she slipped and was not able to stand up by herself, staying on the floor for almost a day until her super found her on floor. Patient reports no LOC, chest pain or shortness of breath. Patient endorses pain to the hip, denies numbness, focal weakness, diarrhea, constipation or abdominal pain, respiratory or urinary symptoms.  Patient admitted for fall and rhabdomyolysis   On admission, CK 2416, started on IVF, trending down, EKG NSR, A1c 10.8 endo on board, vitamin D low, started on oral supplements, abnormal high lipid profile, started on Statin, CTH negative, CT cervical spine no fractures, doppler LE negative for DVT, CXR negative for active pathology.   BP has been around 180s, was on amlodipine, changed by cardio to labetalol and losartan.   Patient with low potassium and phosphorus replaced as needed.    Pending Echo, nutritionist and PT recommends DAVID. 75 year old Female with PMHx of schizophrenia not on any meds, HTN, walks with a walker  presented to the ED after fall on 12/29 and generalized weakness. Patient admits that she was going to get the news paper then she slipped and was not able to stand up by herself, staying on the floor for almost a day until her super found her on floor. Patient reports no LOC, chest pain or shortness of breath. Patient endorses pain to the hip, denies numbness, focal weakness, diarrhea, constipation or abdominal pain, respiratory or urinary symptoms.  Patient admitted for fall and rhabdomyolysis   On admission, CK 2416, started on IVF, trending down, EKG NSR, A1c 10.8 endo on board, new onset DM started on insulin, low vitamin D, started on oral supplements, abnormal high lipid profile, started on Statin, CTH negative, CT cervical spine no fractures, doppler LE negative for DVT, CXR negative for active pathology.   BP has been around 180s, was on amlodipine, changed by cardio to labetalol and losartan.   Patient with low potassium and phosphorus replaced as needed.    Pending Echo, nutritionist and PT recommends DAVID. 75 year old Female with PMHx of schizophrenia not on any meds, HTN, walks with a walker  presented to the ED after fall on 12/29 and generalized weakness. Patient admits that she was going to get the news paper then she slipped and was not able to stand up by herself, staying on the floor for almost a day until her super found her on floor. Patient reports no LOC, chest pain or shortness of breath. Patient endorses pain to the hip, denies numbness, focal weakness, diarrhea, constipation or abdominal pain, respiratory or urinary symptoms.  Patient admitted for fall and rhabdomyolysis   On admission, CK 2416, started on IVF, trending down, EKG NSR, A1c 10.8 endo on board, new onset DM started on insulin, low vitamin D, started on oral supplements, abnormal high lipid profile, started on Statin, CTH negative, CT cervical spine no fractures, doppler LE negative for DVT, CXR negative for active pathology.   BP has been around 180s, was on amlodipine, changed by cardio to labetalol and losartan.   Patient with low potassium and phosphorus replaced as needed.    Echo GIDD and EF 55%, patient seen by nutritionist and PT recommends DAVID.  Patient noted to have drop in Hb from 13 to 10, CT abdomen pelvis [id number]  Patient is stable for discharge and is advised to follow up with PCP as outpatient  Please refer to patient's complete medical chart with documents for a full hospital course, for this is only a brief summary.   75 year old Female with PMHx of schizophrenia not on any meds, HTN, walks with a walker  presented to the ED after fall on 12/29 and generalized weakness. Patient admits that she was going to get the news paper then she slipped and was not able to stand up by herself, staying on the floor for almost a day until her super found her on floor. Patient reports no LOC, chest pain or shortness of breath. Patient endorses pain to the hip, denies numbness, focal weakness, diarrhea, constipation or abdominal pain, respiratory or urinary symptoms.  Patient admitted for fall and rhabdomyolysis   On admission, CK 2416, started on IVF, trending down, EKG NSR, A1c 10.8 endo on board, new onset DM started on insulin, low vitamin D, started on oral supplements, abnormal high lipid profile, started on Statin, CTH negative, CT cervical spine no fractures, doppler LE negative for DVT, CXR negative for active pathology.   BP has been around 180s, was on amlodipine, changed by cardio to labetalol and losartan.   Patient with low potassium and phosphorus replaced as needed.    Echo GIDD and EF 55%, patient seen by nutritionist and PT recommends DAVID.  Patient noted to have drop in Hb from 13 to 10, CT abdomen pelvis showed No bowel obstruction or grossly thickened bowel wall. No CT evidence for a colonic mass. Appendix within normal limits. If clinically indicated, colonoscopy may be pursued for further evaluation. 1.4 cm cystic lesion in the left adnexa, suggestive of a left ovarian cyst. Mild bilateral pleural effusions.        Patient is stable for discharge and is advised to follow up with PCP as outpatient  Please refer to patient's complete medical chart with documents for a full hospital course, for this is only a brief summary.

## 2020-12-31 NOTE — PHYSICAL THERAPY INITIAL EVALUATION ADULT - GENERAL OBSERVATIONS, REHAB EVAL
awake, alert, oriented to person, place, time, and situation but with occasional confusion; +peripheral IV access on left antecubital area

## 2020-12-31 NOTE — CONSULT NOTE ADULT - PROBLEM SELECTOR RECOMMENDATION 9
- pt s/p fall.  CK trending down  - pt with fever today  - Pain is generalized but better today  -Nonopioid Recommendations  - naproxen 250mg po q 12 hours 3 days only  - gabapentin 100mg po q 12 hours  - monitor bs, hgaic - 10  opioid recommendation  - tramadol po prn  Bowel Regimen   - miralax

## 2020-12-31 NOTE — DIETITIAN INITIAL EVALUATION ADULT. - PERTINENT MEDS FT
MEDICATIONS  (STANDING):  atorvastatin 40 milliGRAM(s) Oral at bedtime  dextrose 40% Gel 15 Gram(s) Oral once  dextrose 5%. 1000 milliLiter(s) (50 mL/Hr) IV Continuous <Continuous>  dextrose 5%. 1000 milliLiter(s) (100 mL/Hr) IV Continuous <Continuous>  dextrose 50% Injectable 25 Gram(s) IV Push once  enoxaparin Injectable 40 milliGRAM(s) SubCutaneous daily  ergocalciferol 85743 Unit(s) Oral <User Schedule>  glucagon  Injectable 1 milliGRAM(s) IntraMuscular once  insulin lispro (ADMELOG) corrective regimen sliding scale   SubCutaneous Before meals and at bedtime  labetalol 100 milliGRAM(s) Oral two times a day  losartan 25 milliGRAM(s) Oral daily  pantoprazole    Tablet 40 milliGRAM(s) Oral before breakfast  potassium phosphate IVPB 15 milliMole(s) IV Intermittent once  sodium chloride 0.9%. 1000 milliLiter(s) (75 mL/Hr) IV Continuous <Continuous>    MEDICATIONS  (PRN):  acetaminophen   Tablet .. 650 milliGRAM(s) Oral every 6 hours PRN Temp greater or equal to 38C (100.4F), Mild Pain (1 - 3)  cyclobenzaprine 5 milliGRAM(s) Oral three times a day PRN Muscle Spasm  traMADol 25 milliGRAM(s) Oral every 8 hours PRN Severe Pain (7 - 10)

## 2020-12-31 NOTE — DIETITIAN INITIAL EVALUATION ADULT. - DIET TYPE
DASH/TLC (sodium and cholesterol restricted diet)/consistent carbohydrate (evening snack)/dysphagia 1, pureed, thin liquids

## 2020-12-31 NOTE — PHYSICAL THERAPY INITIAL EVALUATION ADULT - MANUAL MUSCLE TESTING RESULTS, REHAB EVAL
MMT on both upper extremities are grossly graded 3/5; left lower extremity grossly graded 3-/5, right lower extremity grossly graded 2+/5

## 2020-12-31 NOTE — PROGRESS NOTE ADULT - SUBJECTIVE AND OBJECTIVE BOX
PGY-1 Progress Note discussed with attending    PAGER #: [[phone]] TILL 5:00 PM  PLEASE CONTACT ON CALL TEAM:  - On Call Team (Please refer to Elisa) FROM 5:00 PM - 8:30PM  - Nightfloat Team FROM 8:30 -7:30 AM    CHIEF COMPLAINT & BRIEF HOSPITAL COURSE:  75 year old Female with PMHx of scizophrenia not on any meds,walks with a walker  presented to the ED after fall on 12/29 and generalized weakness. Patient admits that she was going to get the news paper then she slipped and was not able to stand up by herself, staying on the floor for almost a day until her super found her on floor. Patient reports no LOC, chest pain or shortness of breath. Patient endorses pain to the hip, denies numbness, focal weakness, diarrhea, constipation or abdominal pain, respiratory or urinary symptoms.  Patient admitted for fall and rhabdomyolysis   On admission, CK 2416, started on IVF, elevated WBC count, A1c 10.8, vitamin D low, started on oral supplements, abnormal high lipid profile, started on Statin,CTH negative, CT cervical spine no fractures, doppler LE negative for DVT, CXR negative for active pathology. Pending Echo and PT.  BP has been around 180    INTERVAL HPI/OVERNIGHT EVENTS:   patient c/o bilateral hip pain and back pain, pain medication ordered for pain control and pain management consulted     MEDICATIONS  (STANDING):  amLODIPine   Tablet 10 milliGRAM(s) Oral daily  atorvastatin 40 milliGRAM(s) Oral at bedtime  dextrose 40% Gel 15 Gram(s) Oral once  dextrose 5%. 1000 milliLiter(s) (50 mL/Hr) IV Continuous <Continuous>  dextrose 5%. 1000 milliLiter(s) (100 mL/Hr) IV Continuous <Continuous>  dextrose 50% Injectable 25 Gram(s) IV Push once  enoxaparin Injectable 40 milliGRAM(s) SubCutaneous daily  ergocalciferol 88188 Unit(s) Oral <User Schedule>  glucagon  Injectable 1 milliGRAM(s) IntraMuscular once  insulin lispro (ADMELOG) corrective regimen sliding scale   SubCutaneous Before meals and at bedtime  potassium phosphate IVPB 15 milliMole(s) IV Intermittent once  sodium chloride 0.9%. 1000 milliLiter(s) (75 mL/Hr) IV Continuous <Continuous>    MEDICATIONS  (PRN):  acetaminophen   Tablet .. 650 milliGRAM(s) Oral every 6 hours PRN Temp greater or equal to 38C (100.4F), Mild Pain (1 - 3)  cyclobenzaprine 5 milliGRAM(s) Oral three times a day PRN Muscle Spasm  traMADol 25 milliGRAM(s) Oral every 8 hours PRN Severe Pain (7 - 10)      Vital Signs Last 24 Hrs  T(C): 37.1 (31 Dec 2020 07:15), Max: 37.4 (30 Dec 2020 11:07)  T(F): 98.8 (31 Dec 2020 07:15), Max: 99.4 (30 Dec 2020 11:07)  HR: 71 (31 Dec 2020 07:15) (70 - 97)  BP: 179/69 (31 Dec 2020 07:15) (164/66 - 201/79)  BP(mean): --  RR: 18 (31 Dec 2020 07:15) (17 - 19)  SpO2: 97% (31 Dec 2020 07:15) (96% - 100%)    PHYSICAL EXAMINATION:  GENERAL: NAD, well built  HEAD:  Atraumatic, Normocephalic  EYES:  conjunctiva and sclera clear  NECK: Supple, No JVD, Normal thyroid  CHEST/LUNG: Clear to auscultation. Clear to percussion bilaterally; No rales, rhonchi, wheezing, or rubs  HEART: Regular rate and rhythm; No murmurs, rubs, or gallops  ABDOMEN: Soft, Nontender, Nondistended; Bowel sounds present  NERVOUS SYSTEM:  Alert & Oriented X3,    EXTREMITIES:  2+ Peripheral Pulses, No clubbing, cyanosis, or edema  SKIN: warm dry                          13.3   10.37 )-----------( 251      ( 31 Dec 2020 06:47 )             41.7     12-31    136  |  101  |  20<H>  ----------------------------<  240<H>  3.2<L>   |  21<L>  |  0.63    Ca    7.9<L>      31 Dec 2020 06:47  Phos  1.3     12-31  Mg     2.5     12-31    TPro  7.1  /  Alb  2.9<L>  /  TBili  0.7  /  DBili  x   /  AST  41<H>  /  ALT  42  /  AlkPhos  91  12-31    LIVER FUNCTIONS - ( 31 Dec 2020 01:20 )  Alb: 2.9 g/dL / Pro: 7.1 g/dL / ALK PHOS: 91 U/L / ALT: 42 U/L DA / AST: 41 U/L / GGT: x           CARDIAC MARKERS ( 31 Dec 2020 06:47 )  0.108 ng/mL / x     / 1068 U/L / x     / x      CARDIAC MARKERS ( 31 Dec 2020 01:17 )  0.125 ng/mL / x     / 1048 U/L / x     / x      CARDIAC MARKERS ( 30 Dec 2020 19:30 )  0.116 ng/mL / x     / 1414 U/L / x     / x      CARDIAC MARKERS ( 30 Dec 2020 12:02 )  0.043 ng/mL / x     / 2416 U/L / x     / x          PT/INR - ( 30 Dec 2020 12:02 )   PT: 13.2 sec;   INR: 1.12 ratio         PTT - ( 30 Dec 2020 12:02 )  PTT:32.8 sec    I&O's Summary                   PGY-1 Progress Note discussed with attending    PAGER #: [[phone]] TILL 5:00 PM  PLEASE CONTACT ON CALL TEAM:  - On Call Team (Please refer to Elisa) FROM 5:00 PM - 8:30PM  - Nightfloat Team FROM 8:30 -7:30 AM    CHIEF COMPLAINT & BRIEF HOSPITAL COURSE:  75 year old Female with PMHx of schizophrenia not on any meds, HTN, walks with a walker  presented to the ED after fall on 12/29 and generalized weakness. Patient admits that she was going to get the news paper then she slipped and was not able to stand up by herself, staying on the floor for almost a day until her super found her on floor. Patient reports no LOC, chest pain or shortness of breath. Patient endorses pain to the hip, denies numbness, focal weakness, diarrhea, constipation or abdominal pain, respiratory or urinary symptoms.  Patient admitted for fall and rhabdomyolysis   On admission, CK 2416, started on IVF, trending down, EKG NSR, A1c 10.8 endo on board, vitamin D low, started on oral supplements, abnormal high lipid profile, started on Statin, CTH negative, CT cervical spine no fractures, doppler LE negative for DVT, CXR negative for active pathology.   BP has been around 180s, was on amlodipine, changed by cardio to labetalol and losartan.   Patient with low potassium and phosphorus replaced as needed.    Pending Echo, nutritionist and PT evaluation.    INTERVAL HPI/OVERNIGHT EVENTS: patient c/o bilateral hip pain and back pain, pain medication ordered for pain control and pain management consulted     MEDICATIONS  (STANDING):  amLODIPine   Tablet 10 milliGRAM(s) Oral daily  atorvastatin 40 milliGRAM(s) Oral at bedtime  dextrose 40% Gel 15 Gram(s) Oral once  dextrose 5%. 1000 milliLiter(s) (50 mL/Hr) IV Continuous <Continuous>  dextrose 5%. 1000 milliLiter(s) (100 mL/Hr) IV Continuous <Continuous>  dextrose 50% Injectable 25 Gram(s) IV Push once  enoxaparin Injectable 40 milliGRAM(s) SubCutaneous daily  ergocalciferol 18876 Unit(s) Oral <User Schedule>  glucagon  Injectable 1 milliGRAM(s) IntraMuscular once  insulin lispro (ADMELOG) corrective regimen sliding scale   SubCutaneous Before meals and at bedtime  potassium phosphate IVPB 15 milliMole(s) IV Intermittent once  sodium chloride 0.9%. 1000 milliLiter(s) (75 mL/Hr) IV Continuous <Continuous>    MEDICATIONS  (PRN):  acetaminophen   Tablet .. 650 milliGRAM(s) Oral every 6 hours PRN Temp greater or equal to 38C (100.4F), Mild Pain (1 - 3)  cyclobenzaprine 5 milliGRAM(s) Oral three times a day PRN Muscle Spasm  traMADol 25 milliGRAM(s) Oral every 8 hours PRN Severe Pain (7 - 10)      Vital Signs Last 24 Hrs  T(C): 37.1 (31 Dec 2020 07:15), Max: 37.4 (30 Dec 2020 11:07)  T(F): 98.8 (31 Dec 2020 07:15), Max: 99.4 (30 Dec 2020 11:07)  HR: 71 (31 Dec 2020 07:15) (70 - 97)  BP: 179/69 (31 Dec 2020 07:15) (164/66 - 201/79)  BP(mean): --  RR: 18 (31 Dec 2020 07:15) (17 - 19)  SpO2: 97% (31 Dec 2020 07:15) (96% - 100%)    PHYSICAL EXAMINATION:  GENERAL: NAD, average weight   HEAD:  Atraumatic, Normocephalic  EYES:  conjunctiva and sclera clear  NECK: Supple, No JVD, Normal thyroid  CHEST/LUNG: Clear to auscultation. Clear to percussion bilaterally; No rales, rhonchi, wheezing, or rubs  HEART: Regular rate and rhythm; No murmurs, rubs, or gallops  ABDOMEN: Soft, Nontender, Nondistended; Bowel sounds present, no masses or pain on palpation   NERVOUS SYSTEM:  Alert & Oriented X3  EXTREMITIES:  2+ Peripheral Pulses, No clubbing, cyanosis, or edema. Bilateral hip pain and back pain without deformities   SKIN: warm dry                          13.3   10.37 )-----------( 251      ( 31 Dec 2020 06:47 )             41.7     12-31    136  |  101  |  20<H>  ----------------------------<  240<H>  3.2<L>   |  21<L>  |  0.63    Ca    7.9<L>      31 Dec 2020 06:47  Phos  1.3     12-31  Mg     2.5     12-31    TPro  7.1  /  Alb  2.9<L>  /  TBili  0.7  /  DBili  x   /  AST  41<H>  /  ALT  42  /  AlkPhos  91  12-31    LIVER FUNCTIONS - ( 31 Dec 2020 01:20 )  Alb: 2.9 g/dL / Pro: 7.1 g/dL / ALK PHOS: 91 U/L / ALT: 42 U/L DA / AST: 41 U/L / GGT: x           CARDIAC MARKERS ( 31 Dec 2020 06:47 )  0.108 ng/mL / x     / 1068 U/L / x     / x      CARDIAC MARKERS ( 31 Dec 2020 01:17 )  0.125 ng/mL / x     / 1048 U/L / x     / x      CARDIAC MARKERS ( 30 Dec 2020 19:30 )  0.116 ng/mL / x     / 1414 U/L / x     / x      CARDIAC MARKERS ( 30 Dec 2020 12:02 )  0.043 ng/mL / x     / 2416 U/L / x     / x          PT/INR - ( 30 Dec 2020 12:02 )   PT: 13.2 sec;   INR: 1.12 ratio         PTT - ( 30 Dec 2020 12:02 )  PTT:32.8 sec                       PGY-1 Progress Note discussed with attending    PAGER #: [[phone]] TILL 5:00 PM  PLEASE CONTACT ON CALL TEAM:  - On Call Team (Please refer to Elisa) FROM 5:00 PM - 8:30PM  - Nightfloat Team FROM 8:30 -7:30 AM    CHIEF COMPLAINT & BRIEF HOSPITAL COURSE:  75 year old Female with PMHx of schizophrenia not on any meds, HTN, walks with a walker  presented to the ED after fall on 12/29 and generalized weakness. Patient admits that she was going to get the news paper then she slipped and was not able to stand up by herself, staying on the floor for almost a day until her super found her on floor. Patient reports no LOC, chest pain or shortness of breath. Patient endorses pain to the hip, denies numbness, focal weakness, diarrhea, constipation or abdominal pain, respiratory or urinary symptoms.  Patient admitted for fall and rhabdomyolysis   On admission, CK 2416, started on IVF, trending down, EKG NSR, A1c 10.8 endo on board, vitamin D low, started on oral supplements, abnormal high lipid profile, started on Statin, CTH negative, CT cervical spine no fractures, doppler LE negative for DVT, CXR negative for active pathology.   BP has been around 180s, was on amlodipine, changed by cardio to labetalol and losartan.   Patient with low potassium and phosphorus replaced as needed.    Pending Echo, nutritionist and PT evaluation.    INTERVAL HPI/OVERNIGHT EVENTS: patient c/o bilateral hip pain and back pain, pain medication ordered for pain control and pain management consulted     MEDICATIONS  (STANDING):  amLODIPine   Tablet 10 milliGRAM(s) Oral daily  atorvastatin 40 milliGRAM(s) Oral at bedtime  dextrose 40% Gel 15 Gram(s) Oral once  dextrose 5%. 1000 milliLiter(s) (50 mL/Hr) IV Continuous <Continuous>  dextrose 5%. 1000 milliLiter(s) (100 mL/Hr) IV Continuous <Continuous>  dextrose 50% Injectable 25 Gram(s) IV Push once  enoxaparin Injectable 40 milliGRAM(s) SubCutaneous daily  ergocalciferol 47385 Unit(s) Oral <User Schedule>  glucagon  Injectable 1 milliGRAM(s) IntraMuscular once  insulin lispro (ADMELOG) corrective regimen sliding scale   SubCutaneous Before meals and at bedtime  potassium phosphate IVPB 15 milliMole(s) IV Intermittent once  sodium chloride 0.9%. 1000 milliLiter(s) (75 mL/Hr) IV Continuous <Continuous>    MEDICATIONS  (PRN):  acetaminophen   Tablet .. 650 milliGRAM(s) Oral every 6 hours PRN Temp greater or equal to 38C (100.4F), Mild Pain (1 - 3)  cyclobenzaprine 5 milliGRAM(s) Oral three times a day PRN Muscle Spasm  traMADol 25 milliGRAM(s) Oral every 8 hours PRN Severe Pain (7 - 10)      Vital Signs Last 24 Hrs  T(C): 37.1 (31 Dec 2020 07:15), Max: 37.4 (30 Dec 2020 11:07)  T(F): 98.8 (31 Dec 2020 07:15), Max: 99.4 (30 Dec 2020 11:07)  HR: 71 (31 Dec 2020 07:15) (70 - 97)  BP: 179/69 (31 Dec 2020 07:15) (164/66 - 201/79)  BP(mean): --  RR: 18 (31 Dec 2020 07:15) (17 - 19)  SpO2: 97% (31 Dec 2020 07:15) (96% - 100%)    PHYSICAL EXAMINATION:  GENERAL: NAD, overweight   HEAD:  Atraumatic, Normocephalic  EYES:  conjunctiva and sclera clear  NECK: Supple, No JVD, Normal thyroid  CHEST/LUNG: Clear to auscultation. Clear to percussion bilaterally; No rales, rhonchi, wheezing, or rubs  HEART: Regular rate and rhythm; No murmurs, rubs, or gallops  ABDOMEN: Soft, Nontender, Nondistended; Bowel sounds present, no masses or pain on palpation   NERVOUS SYSTEM:  Alert & Oriented X3  EXTREMITIES:  2+ Peripheral Pulses, No clubbing, cyanosis, or edema. Bilateral hip pain and back pain without deformities   SKIN: warm dry                          13.3   10.37 )-----------( 251      ( 31 Dec 2020 06:47 )             41.7     12-31    136  |  101  |  20<H>  ----------------------------<  240<H>  3.2<L>   |  21<L>  |  0.63    Ca    7.9<L>      31 Dec 2020 06:47  Phos  1.3     12-31  Mg     2.5     12-31    TPro  7.1  /  Alb  2.9<L>  /  TBili  0.7  /  DBili  x   /  AST  41<H>  /  ALT  42  /  AlkPhos  91  12-31    LIVER FUNCTIONS - ( 31 Dec 2020 01:20 )  Alb: 2.9 g/dL / Pro: 7.1 g/dL / ALK PHOS: 91 U/L / ALT: 42 U/L DA / AST: 41 U/L / GGT: x           CARDIAC MARKERS ( 31 Dec 2020 06:47 )  0.108 ng/mL / x     / 1068 U/L / x     / x      CARDIAC MARKERS ( 31 Dec 2020 01:17 )  0.125 ng/mL / x     / 1048 U/L / x     / x      CARDIAC MARKERS ( 30 Dec 2020 19:30 )  0.116 ng/mL / x     / 1414 U/L / x     / x      CARDIAC MARKERS ( 30 Dec 2020 12:02 )  0.043 ng/mL / x     / 2416 U/L / x     / x          PT/INR - ( 30 Dec 2020 12:02 )   PT: 13.2 sec;   INR: 1.12 ratio         PTT - ( 30 Dec 2020 12:02 )  PTT:32.8 sec

## 2020-12-31 NOTE — DISCHARGE NOTE PROVIDER - NSDCQMAMI_CARD_ALL_CORE
Continuity of Care Form    Patient Name: Yari Quijano   :  1983  MRN:  [id number]    Admit date:  2019  Discharge date:  2019    Code Status Order: Full Code   Advance Directives:   Advance Care Flowsheet Documentation     Date/Time Healthcare Directive Type of Healthcare Directive Copy in [address] Agent's Name Healthcare Agent's Phone Number    19 [phone]  Yes, patient has an advance directive for healthcare treatment  Living will  No, copy requested from family  [facility]  [phone]          Admitting Physician:  Francisca Dick DO  PCP: ALANIS Obrien - CNP    Discharging Nurse: Daiana Wayne [address] Unit/Room#: 6K-10/010-A  Discharging Unit Phone Number: [phone]    Emergency Contact:   Extended Emergency Contact Information  Primary Emergency Contact: Chriss Contreras  Address: [address] Phone: [phone]  Relation: Spouse    Past Surgical History:  Past Surgical History:   Procedure Laterality Date    BURN TREATMENT      CHOLECYSTECTOMY      NASAL FRACTURE SURGERY      PARACENTESIS      [place] EGD TRANSORAL BIOPSY SINGLE/MULTIPLE N/A 3/20/2018    EGD  POSS BANDING performed by Toma Ferrari MD at [facility] Left 2018    EGD BAND LIGATION performed by Toma Ferrari MD at [address] [place] [address] Left 2018    EGD BAND LIGATION performed by Mindy Chavez MD at [address] [place] [address] N/A 2018    EGD BAND LIGATION performed by Toma Ferrari MD at [address] [place] [address] N/A 10/23/2018    EGD BAND LIGATION performed by Toma Ferrari MD at [address] [place] [address]  10/23/2018    EGD ESOPHAGOGASTRODUODENOSCOPY performed by [facility] MD at [facility]    UPPER GASTROINTESTINAL ENDOSCOPY N/A 1/29/2019    EGD WITH BANDING performed by Sadie Conde MD at [facility]       Immunization History:   Immunization History   Administered Date(s) Administered    Influenza, Quadv, 6 mo and older, IM, PF (Flulaval, Fluarix) 01/19/2019    Influenza, Triv, 3 Years and older, IM (Carolene Settler (5 yrs and older) 01/24/2017    Tdap (Boostrix, Adacel) 08/06/2013       Active Problems:  Patient Active Problem List   Diagnosis Code    Pancreatitis K85.90    Acute pancreatitis K85.90    Anxiety F41.9    Acute blood loss anemia C77    Alcoholic cirrhosis ([address] D64.06    Hypocalcemia E83.51    Diarrhea R19.7    Bloody stool K92.1    Essential hypertension I10    S/P gastric bypass Z98.84    Bipolar disorder ([address] F31.9    Esophageal varices ([address] I85.00    Hypothyroidism S16.3    Alcoholic cirrhosis of liver without ascites ([address] K70.30    Hematemesis K92.0    Moderate malnutrition ([address] E44.0    Hematemesis with nausea K92.0    GI bleed K92.2    Hypokalemia E87.6    Alcohol abuse F10.10    Cirrhosis of liver ([address] K74.60    Acute hyponatremia E87.1    Hypothyroidism E03.9    Leukocytosis D72.829    Moderate malnutrition (HCC) E44.0    Hypotension I95.9    Abdominal pain R10.9    Anemia D64.9    Hypocalcemia E83.51    Elevated transaminase level R74.0    Elevated INR R79.1       Isolation/Infection:   Isolation          Contact        Patient Infection Status     Infection Encounter Level?  Added Added By Resolved Resolved By Review Date Onset Date    MRSA No 01/24/17 MRSA by PCR        [place] 1/17, 2/2018, 1/2019          Nurse Assessment:  Last Vital Signs: /63   Pulse 91   Temp 98.8 °F (37.1 °C) (Oral)   Resp 16   Ht 5' 5\" (1.651 m)   Wt 154 lb 14.4 oz (70.3 kg)   SpO2 99%   BMI 25.78 kg/m²     Last documented pain score (0-10 scale): Pain Level: 5  Last Weight:   Wt Readings from Last 1 Encounters:   01/30/19 154 lb 14.4 Worker signature: Electronically signed by KIMBERLY Sanz on 1/30/19 at 10:01 AM    PHYSICIAN SECTION    Prognosis: Good    Condition at Discharge: Stable    Rehab Potential (if transferring to Rehab): Good    Recommended Labs or Other Treatments After Discharge:     Physician Certification: I certify the above information and transfer of Melissa Carrillo  is necessary for the continuing treatment of the diagnosis listed and that she requires [place] for less 30 days.      Update Admission H&P: No change in H&P    PHYSICIAN SIGNATURE:  Electronically signed by Gail Adkins MD on 1/30/2019 at 1:34 PM No

## 2020-12-31 NOTE — CHART NOTE - NSCHARTNOTEFT_GEN_A_CORE
EVENT: Hypertension: Patient admitted for rhabdomyolysis s/p mechanical remains hypertensive s/p amlodipine administration. Labetalol 2.5mg IVP x 1 dose ordered. IVF rate decreased from 200 ml/hr to 75 ml/hr as pro BNP elevated. Repeat troponin @ 01:00    OBJECTIVE:  Vital Signs Last 24 Hrs  T(C): 36.5 (30 Dec 2020 22:29), Max: 37.4 (30 Dec 2020 11:07)  T(F): 97.7 (30 Dec 2020 22:29), Max: 99.4 (30 Dec 2020 11:07)  HR: 97 (30 Dec 2020 22:29) (76 - 97)  BP: 183/62 (30 Dec 2020 22:29) (183/62 - 201/79)  BP(mean): --  RR: 18 (30 Dec 2020 22:29) (17 - 18)  SpO2: 100% (30 Dec 2020 22:29) (97% - 100%)    FOCUSED PHYSICAL EXAM: A&OX3. No neuro deficits noted. Denies pain at this time. Breathing unlaboured.    LABS:  Serum Pro-Brain Natriuretic Peptide (12.30.20 @ 19:30)   Serum Pro-Brain Natriuretic Peptide: 3429: Interpretive guide for NT PRO-BNP   Rule out Acute CHF < 300 pg/mL (All ages).   Rule in Acute CHF (80-90% predictive value)   < 50 years old > 450 pg/mL.   50-75 years old > 900 pg/mL.   > 75 years old > 1800 pg/mL. pg/mL                         15.2   12.46 )-----------( 232      ( 30 Dec 2020 12:02 )             46.2   CARDIAC MARKERS ( 30 Dec 2020 19:30 )  0.116 ng/mL / x     / 1414 U/L / x     / x      CARDIAC MARKERS ( 30 Dec 2020 12:02 )  0.043 ng/mL / x     / 2416 U/L / x     / x        12-30    132<L>  |  101  |  17  ----------------------------<  265<H>  4.1   |  14<L>  |  0.55    Ca    7.6<L>      30 Dec 2020 13:29  Phos  2.1     12-30  Mg     2.2     12-30    TPro  8.4<H>  /  Alb  3.4<L>  /  TBili  1.1  /  DBili  x   /  AST  82<H>  /  ALT  51  /  AlkPhos  107  12-30      EKG: NSR  IMAGING: < from: Xray Chest 1 View- PORTABLE-Urgent (Xray Chest 1 View- PORTABLE-Urgent .) (12.30.20 @ 15:16) >        PROCEDURE DATE:  12/30/2020          INTERPRETATION:  Portable chest radiograph    CLINICAL INFORMATION: Polytrauma. Fall. Pain    TECHNIQUE:  Portable  AP view of the chest was obtained.    COMPARISON: 9/6/2019 chest radiograph available for review.    FINDINGS:  The lungs are clear of airspace consolidations or effusions. No pneumothorax.    The heart and mediastinum are within normal limits.    Visualized osseous structures are intact.        IMPRESSION:   No evidence of active chest disease.      < end of copied text >        ASSESSMENT:  HPI:   75 year old Female  with  PMHx of scizophrenia not on any meds,walks with a walker  presented to the ED after fall yesterday 12/29 and generalized weakness. Patient admits that she was going to get the news paper then she slipped and wasnot able to stand and was left on the floor for almost a day until her super found her on floor . Patient reports no LOC, chest pain or shortness of breath.  Patient endorses pain to the hip, denies numbness, focal weakness. diarrhea, constipation or abdominal pain . she was admitted to the hospital 1 year ago for generalized weakness and was discharged on medications for HTN and schizophrenia that was diagnosed at that time but she didnot pick the medications and never went to a doctor since then . The patient vomited once while interviewing her,  (30 Dec 2020 15:34)      PLAN: Hypertension likely 2/2 poorly controlled underlying hypertension vs new onset CHF( CHF exacerbation)   - Labetalol 2.5 mg IVP x 1 dose  - IVF rate decreased to 75 ml/hr in setting of possible new onset or undiagnosed CHF   - Follow up troponin  -Echo   -Continue Amlodipine 5mg and titrate as needed    FOLLOW UP / RESULT:

## 2020-12-31 NOTE — DISCHARGE NOTE PROVIDER - NSDCMRMEDTOKEN_GEN_ALL_CORE_FT
amLODIPine 5 mg oral tablet: 1 tab(s) orally once a day   atorvastatin 40 mg oral tablet: 1 tab(s) orally once a day (at bedtime)  bisacodyl 5 mg oral delayed release tablet: 1 tab(s) orally every 12 hours, As needed, Constipation  gabapentin 100 mg oral capsule: 2 cap(s) orally every 12 hours  labetalol 100 mg oral tablet: 1 tab(s) orally 2 times a day  losartan 25 mg oral tablet: 1 tab(s) orally once a day  pantoprazole 40 mg oral delayed release tablet: 1 tab(s) orally once a day (before a meal)  Vitamin D3 1000 intl units (25 mcg) oral capsule: 1 cap(s) orally once a day

## 2020-12-31 NOTE — PROGRESS NOTE ADULT - PROBLEM SELECTOR PLAN 3
-patient presented with high blood pressure   -started on amlodipine 5mg   monitor BP   DASH diet - patient presented with a fall and was left on the floor for aday   - CK 2416   - started on aggressive hydration at 200 cc   - f/u u/s doppler LEs  - hip xray showed no bone pathology   -CT head no acute event  -f/u echo   - f/u PT eval - Started on Losartan and Labetalol  - Monitor VS  - Cardio Dr Pretty

## 2020-12-31 NOTE — DIETITIAN INITIAL EVALUATION ADULT. - ADD RECOMMEND
change diet to aimee. MD notified. DM edu deferred given cog limitations. add Glucerna BID if inadequate PO intake.

## 2020-12-31 NOTE — PROGRESS NOTE ADULT - PROBLEM SELECTOR PLAN 5
discussed with the patient and she needs more time to think about it. - Low vitamin D  - Started on oral supplements

## 2020-12-31 NOTE — PHYSICAL THERAPY INITIAL EVALUATION ADULT - ASR WT BEARING STATUS EVAL
no complaints of pain noted; recent x-ray of bilateral hips negative for fracture; recent CT of head and cervical spine negative for intracranial hemorrhage or fracture respectively/no weight-bearing restrictions

## 2021-01-01 DIAGNOSIS — R52 PAIN, UNSPECIFIED: ICD-10-CM

## 2021-01-01 LAB
ANION GAP SERPL CALC-SCNC: 9 MMOL/L — SIGNIFICANT CHANGE UP (ref 5–17)
BUN SERPL-MCNC: 21 MG/DL — HIGH (ref 7–18)
CALCIUM SERPL-MCNC: 7.5 MG/DL — LOW (ref 8.4–10.5)
CHLORIDE SERPL-SCNC: 101 MMOL/L — SIGNIFICANT CHANGE UP (ref 96–108)
CK SERPL-CCNC: 641 U/L — HIGH (ref 21–215)
CO2 SERPL-SCNC: 22 MMOL/L — SIGNIFICANT CHANGE UP (ref 22–31)
CREAT SERPL-MCNC: 0.6 MG/DL — SIGNIFICANT CHANGE UP (ref 0.5–1.3)
GLUCOSE BLDC GLUCOMTR-MCNC: 180 MG/DL — HIGH (ref 70–99)
GLUCOSE BLDC GLUCOMTR-MCNC: 220 MG/DL — HIGH (ref 70–99)
GLUCOSE BLDC GLUCOMTR-MCNC: 248 MG/DL — HIGH (ref 70–99)
GLUCOSE BLDC GLUCOMTR-MCNC: 292 MG/DL — HIGH (ref 70–99)
GLUCOSE SERPL-MCNC: 285 MG/DL — HIGH (ref 70–99)
HCT VFR BLD CALC: 35.3 % — SIGNIFICANT CHANGE UP (ref 34.5–45)
HGB BLD-MCNC: 11.4 G/DL — LOW (ref 11.5–15.5)
MCHC RBC-ENTMCNC: 28.6 PG — SIGNIFICANT CHANGE UP (ref 27–34)
MCHC RBC-ENTMCNC: 32.3 GM/DL — SIGNIFICANT CHANGE UP (ref 32–36)
MCV RBC AUTO: 88.7 FL — SIGNIFICANT CHANGE UP (ref 80–100)
NRBC # BLD: 0 /100 WBCS — SIGNIFICANT CHANGE UP (ref 0–0)
PLATELET # BLD AUTO: 198 K/UL — SIGNIFICANT CHANGE UP (ref 150–400)
POTASSIUM SERPL-MCNC: 3.8 MMOL/L — SIGNIFICANT CHANGE UP (ref 3.5–5.3)
POTASSIUM SERPL-SCNC: 3.8 MMOL/L — SIGNIFICANT CHANGE UP (ref 3.5–5.3)
RBC # BLD: 3.98 M/UL — SIGNIFICANT CHANGE UP (ref 3.8–5.2)
RBC # FLD: 12.5 % — SIGNIFICANT CHANGE UP (ref 10.3–14.5)
SARS-COV-2 IGG SERPL QL IA: NEGATIVE — SIGNIFICANT CHANGE UP
SARS-COV-2 IGM SERPL IA-ACNC: <0.1 INDEX — SIGNIFICANT CHANGE UP
SODIUM SERPL-SCNC: 132 MMOL/L — LOW (ref 135–145)
WBC # BLD: 6.39 K/UL — SIGNIFICANT CHANGE UP (ref 3.8–10.5)
WBC # FLD AUTO: 6.39 K/UL — SIGNIFICANT CHANGE UP (ref 3.8–10.5)

## 2021-01-01 RX ORDER — GABAPENTIN 400 MG/1
100 CAPSULE ORAL EVERY 12 HOURS
Refills: 0 | Status: DISCONTINUED | OUTPATIENT
Start: 2021-01-01 | End: 2021-01-02

## 2021-01-01 RX ORDER — SODIUM CHLORIDE 9 MG/ML
1000 INJECTION INTRAMUSCULAR; INTRAVENOUS; SUBCUTANEOUS
Refills: 0 | Status: DISCONTINUED | OUTPATIENT
Start: 2021-01-01 | End: 2021-01-02

## 2021-01-01 RX ADMIN — Medication 650 MILLIGRAM(S): at 00:48

## 2021-01-01 RX ADMIN — SODIUM CHLORIDE 70 MILLILITER(S): 9 INJECTION INTRAMUSCULAR; INTRAVENOUS; SUBCUTANEOUS at 09:28

## 2021-01-01 RX ADMIN — Medication 100 MILLIGRAM(S): at 06:11

## 2021-01-01 RX ADMIN — INSULIN GLARGINE 10 UNIT(S): 100 INJECTION, SOLUTION SUBCUTANEOUS at 21:34

## 2021-01-01 RX ADMIN — Medication 1: at 21:35

## 2021-01-01 RX ADMIN — GABAPENTIN 100 MILLIGRAM(S): 400 CAPSULE ORAL at 17:28

## 2021-01-01 RX ADMIN — SODIUM CHLORIDE 70 MILLILITER(S): 9 INJECTION INTRAMUSCULAR; INTRAVENOUS; SUBCUTANEOUS at 21:24

## 2021-01-01 RX ADMIN — Medication 2: at 08:40

## 2021-01-01 RX ADMIN — Medication 250 MILLIGRAM(S): at 17:27

## 2021-01-01 RX ADMIN — Medication 3 UNIT(S): at 12:39

## 2021-01-01 RX ADMIN — ATORVASTATIN CALCIUM 40 MILLIGRAM(S): 80 TABLET, FILM COATED ORAL at 21:24

## 2021-01-01 RX ADMIN — ENOXAPARIN SODIUM 40 MILLIGRAM(S): 100 INJECTION SUBCUTANEOUS at 12:40

## 2021-01-01 RX ADMIN — PANTOPRAZOLE SODIUM 40 MILLIGRAM(S): 20 TABLET, DELAYED RELEASE ORAL at 06:11

## 2021-01-01 RX ADMIN — LOSARTAN POTASSIUM 25 MILLIGRAM(S): 100 TABLET, FILM COATED ORAL at 06:11

## 2021-01-01 RX ADMIN — TRAMADOL HYDROCHLORIDE 25 MILLIGRAM(S): 50 TABLET ORAL at 14:18

## 2021-01-01 RX ADMIN — Medication 650 MILLIGRAM(S): at 00:09

## 2021-01-01 RX ADMIN — Medication 3 UNIT(S): at 08:39

## 2021-01-01 RX ADMIN — Medication 5 MILLIGRAM(S): at 00:42

## 2021-01-01 RX ADMIN — Medication 2: at 17:25

## 2021-01-01 RX ADMIN — Medication 3 UNIT(S): at 17:25

## 2021-01-01 RX ADMIN — TRAMADOL HYDROCHLORIDE 25 MILLIGRAM(S): 50 TABLET ORAL at 17:31

## 2021-01-01 RX ADMIN — POLYETHYLENE GLYCOL 3350 17 GRAM(S): 17 POWDER, FOR SOLUTION ORAL at 01:30

## 2021-01-01 RX ADMIN — POLYETHYLENE GLYCOL 3350 17 GRAM(S): 17 POWDER, FOR SOLUTION ORAL at 17:26

## 2021-01-01 RX ADMIN — Medication 100 MILLIGRAM(S): at 17:26

## 2021-01-01 RX ADMIN — Medication 3: at 12:40

## 2021-01-01 RX ADMIN — SODIUM CHLORIDE 75 MILLILITER(S): 9 INJECTION INTRAMUSCULAR; INTRAVENOUS; SUBCUTANEOUS at 06:12

## 2021-01-01 NOTE — PROGRESS NOTE ADULT - SUBJECTIVE AND OBJECTIVE BOX
Patient is a 75y old  Female who presents with a chief complaint of Rhabdomyolysis (31 Dec 2020 16:49)    pt seen in tele [ x ], reg med floor [   ], bed [x  ], chair at bedside [   ], a+o x3 [ x ], lethargic [  ],    nad [x  ]      Allergies    Allergy Status Unknown        Vitals    T(F): 98.6 (01-01-21 @ 05:06), Max: 100.5 (12-31-20 @ 23:53)  HR: 72 (01-01-21 @ 05:06) (63 - 87)  BP: 139/65 (01-01-21 @ 05:06) (123/34 - 181/72)  RR: 18 (01-01-21 @ 05:06) (18 - 19)  SpO2: 98% (01-01-21 @ 05:06) (93% - 100%)  Wt(kg): --  CAPILLARY BLOOD GLUCOSE      POCT Blood Glucose.: 273 mg/dL (31 Dec 2020 21:10)      Labs                          13.3   10.37 )-----------( 251      ( 31 Dec 2020 06:47 )             41.7       12-31    134<L>  |  102  |  24<H>  ----------------------------<  259<H>  3.7   |  22  |  0.68    Ca    7.8<L>      31 Dec 2020 15:37  Phos  1.3     12-31  Mg     2.5     12-31    TPro  7.1  /  Alb  2.9<L>  /  TBili  0.7  /  DBili  x   /  AST  41<H>  /  ALT  42  /  AlkPhos  91  12-31      CARDIAC MARKERS ( 31 Dec 2020 06:47 )  0.108 ng/mL / x     / 1068 U/L / x     / x      CARDIAC MARKERS ( 31 Dec 2020 01:17 )  0.125 ng/mL / x     / 1048 U/L / x     / x      CARDIAC MARKERS ( 30 Dec 2020 19:30 )  0.116 ng/mL / x     / 1414 U/L / x     / x      CARDIAC MARKERS ( 30 Dec 2020 12:02 )  0.043 ng/mL / x     / 2416 U/L / x     / x                Radiology Results      Meds    MEDICATIONS  (STANDING):  atorvastatin 40 milliGRAM(s) Oral at bedtime  dextrose 40% Gel 15 Gram(s) Oral once  dextrose 5%. 1000 milliLiter(s) (50 mL/Hr) IV Continuous <Continuous>  dextrose 5%. 1000 milliLiter(s) (100 mL/Hr) IV Continuous <Continuous>  dextrose 50% Injectable 25 Gram(s) IV Push once  enoxaparin Injectable 40 milliGRAM(s) SubCutaneous daily  ergocalciferol 12217 Unit(s) Oral <User Schedule>  glucagon  Injectable 1 milliGRAM(s) IntraMuscular once  insulin glargine Injectable (LANTUS) 10 Unit(s) SubCutaneous at bedtime  insulin lispro (ADMELOG) corrective regimen sliding scale   SubCutaneous Before meals and at bedtime  insulin lispro Injectable (ADMELOG) 3 Unit(s) SubCutaneous three times a day before meals  labetalol 100 milliGRAM(s) Oral two times a day  losartan 25 milliGRAM(s) Oral daily  pantoprazole    Tablet 40 milliGRAM(s) Oral before breakfast  polyethylene glycol 3350 17 Gram(s) Oral daily      MEDICATIONS  (PRN):  acetaminophen   Tablet .. 650 milliGRAM(s) Oral every 6 hours PRN Temp greater or equal to 38C (100.4F), Mild Pain (1 - 3)  bisacodyl 5 milliGRAM(s) Oral every 12 hours PRN Constipation  cyclobenzaprine 5 milliGRAM(s) Oral three times a day PRN Muscle Spasm  traMADol 25 milliGRAM(s) Oral every 8 hours PRN Severe Pain (7 - 10)      Physical Exam    Neuro :  no focal deficits  Respiratory: CTA B/L  CV: RRR, S1S2, no murmurs,   Abdominal: Soft, NT, ND +BS,  Extremities: No edema, + peripheral pulses    ASSESSMENT      s/p fall  Rhabdomyolysis   uncontrolled htn  r/o acs  hyperglycemia   r/o dm   myalgia   hypokalemia  h/o Schizophrenia        PLAN      cont tele,   acs protocol,   trop x 3 noted above  start labetalol, aspirin, statin,   cardio cons  f/u echo  f/u ck  cont  ivf,   phys tx eval  f/u hgba1c  cont hss  endo cons  phys tx eval  pain mgmt eval  add flexeril   supplement potassium as needed  cont current meds         Patient is a 75y old  Female who presents with a chief complaint of Rhabdomyolysis (31 Dec 2020 16:49)    pt seen in tele [ x ], reg med floor [   ], bed [x  ], chair at bedside [   ], a+o x3 [ x ], lethargic [  ],    nad [x  ]      Allergies    Allergy Status Unknown        Vitals    T(F): 98.6 (01-01-21 @ 05:06), Max: 100.5 (12-31-20 @ 23:53)  HR: 72 (01-01-21 @ 05:06) (63 - 87)  BP: 139/65 (01-01-21 @ 05:06) (123/34 - 181/72)  RR: 18 (01-01-21 @ 05:06) (18 - 19)  SpO2: 98% (01-01-21 @ 05:06) (93% - 100%)  Wt(kg): --  CAPILLARY BLOOD GLUCOSE      POCT Blood Glucose.: 273 mg/dL (31 Dec 2020 21:10)      Labs                          13.3   10.37 )-----------( 251      ( 31 Dec 2020 06:47 )             41.7       12-31    134<L>  |  102  |  24<H>  ----------------------------<  259<H>  3.7   |  22  |  0.68    Ca    7.8<L>      31 Dec 2020 15:37  Phos  1.3     12-31  Mg     2.5     12-31    TPro  7.1  /  Alb  2.9<L>  /  TBili  0.7  /  DBili  x   /  AST  41<H>  /  ALT  42  /  AlkPhos  91  12-31      CARDIAC MARKERS ( 31 Dec 2020 06:47 )  0.108 ng/mL / x     / 1068 U/L / x     / x      CARDIAC MARKERS ( 31 Dec 2020 01:17 )  0.125 ng/mL / x     / 1048 U/L / x     / x      CARDIAC MARKERS ( 30 Dec 2020 19:30 )  0.116 ng/mL / x     / 1414 U/L / x     / x      CARDIAC MARKERS ( 30 Dec 2020 12:02 )  0.043 ng/mL / x     / 2416 U/L / x     / x          A1C with Estimated Average Glucose Result: 10.6: Method: Immunoassay       Radiology Results      Meds    MEDICATIONS  (STANDING):  atorvastatin 40 milliGRAM(s) Oral at bedtime  dextrose 40% Gel 15 Gram(s) Oral once  dextrose 5%. 1000 milliLiter(s) (50 mL/Hr) IV Continuous <Continuous>  dextrose 5%. 1000 milliLiter(s) (100 mL/Hr) IV Continuous <Continuous>  dextrose 50% Injectable 25 Gram(s) IV Push once  enoxaparin Injectable 40 milliGRAM(s) SubCutaneous daily  ergocalciferol 31554 Unit(s) Oral <User Schedule>  glucagon  Injectable 1 milliGRAM(s) IntraMuscular once  insulin glargine Injectable (LANTUS) 10 Unit(s) SubCutaneous at bedtime  insulin lispro (ADMELOG) corrective regimen sliding scale   SubCutaneous Before meals and at bedtime  insulin lispro Injectable (ADMELOG) 3 Unit(s) SubCutaneous three times a day before meals  labetalol 100 milliGRAM(s) Oral two times a day  losartan 25 milliGRAM(s) Oral daily  pantoprazole    Tablet 40 milliGRAM(s) Oral before breakfast  polyethylene glycol 3350 17 Gram(s) Oral daily      MEDICATIONS  (PRN):  acetaminophen   Tablet .. 650 milliGRAM(s) Oral every 6 hours PRN Temp greater or equal to 38C (100.4F), Mild Pain (1 - 3)  bisacodyl 5 milliGRAM(s) Oral every 12 hours PRN Constipation  cyclobenzaprine 5 milliGRAM(s) Oral three times a day PRN Muscle Spasm  traMADol 25 milliGRAM(s) Oral every 8 hours PRN Severe Pain (7 - 10)      Physical Exam    Neuro :  no focal deficits  Respiratory: CTA B/L  CV: RRR, S1S2, no murmurs,   Abdominal: Soft, NT, ND +BS,  Extremities: No edema, + peripheral pulses    ASSESSMENT      s/p fall  Rhabdomyolysis   uncontrolled htn  r/o acs  hyperglycemia   r/o dm   myalgia   hypokalemia  h/o Schizophrenia        PLAN      cont tele,   acs protocol,   trop x 3 noted above  cont aspirin, statin,   cardio f/u   cont labetalol and losartan  f/u echo   f/u ck  cont  ivf,   hgba1c 10.6 noted above  cont hss  endo f/u   Lantus 10 units hs   Ademlog  3units  TID pre meals with SS coverage.   Diabetic education   nutrition eval  phys tx eval noted and rec brunilda  pain mgmt eval  cont flexeril   supplement potassium as needed  cont current meds

## 2021-01-01 NOTE — PROGRESS NOTE ADULT - PROBLEM SELECTOR PLAN 6
- Low potassium and low phosphorus resolved*  - S/p IV replacement - Low potassium and low phosphorus resolved*  - S/p IV replacement  - Mild hyponatremia on IVF

## 2021-01-01 NOTE — PROGRESS NOTE ADULT - ASSESSMENT
75 year old Female  with  PMHx of schizophrenia not on any meds,walks with a walker  presented to the ED after fall on 12/29 and generalized weakness,new onset Dm,uncontrolled HTN and abnormal EKG.  1.Tele monitoring.  2.DM-Insulin,Endo eval.  3.Echocardiogram.  4.HTN-cont labetalol 100mg bid and cozaar 25mg qd.  5.Lipid d/o-statin.  6.GI and DVT prophylaxis.

## 2021-01-01 NOTE — PROGRESS NOTE ADULT - SUBJECTIVE AND OBJECTIVE BOX
CHIEF COMPLAINT:Patient is a 75y old  Female who presents with a chief complaint of Rhabdomyolysis.Pt appears comfortable.    	  REVIEW OF SYSTEMS:  CONSTITUTIONAL: No fever, weight loss, or fatigue  EYES: No eye pain, visual disturbances, or discharge  ENT:  No difficulty hearing, tinnitus, vertigo; No sinus or throat pain  NECK: No pain or stiffness  RESPIRATORY: No cough, wheezing, chills or hemoptysis; No Shortness of Breath  CARDIOVASCULAR: No chest pain, palpitations, passing out, dizziness, or leg swelling  GASTROINTESTINAL: No abdominal or epigastric pain. No nausea, vomiting, or hematemesis; No diarrhea or constipation. No melena or hematochezia.  GENITOURINARY: No dysuria, frequency, hematuria, or incontinence  NEUROLOGICAL: No headaches, memory loss, loss of strength, numbness, or tremors  SKIN: No itching, burning, rashes, or lesions   LYMPH Nodes: No enlarged glands  ENDOCRINE: No heat or cold intolerance; No hair loss  MUSCULOSKELETAL: No joint pain or swelling; No muscle, back, or extremity pain  PSYCHIATRIC: No depression, anxiety, mood swings, or difficulty sleeping  HEME/LYMPH: No easy bruising, or bleeding gums  ALLERGY AND IMMUNOLOGIC: No hives or eczema	        PHYSICAL EXAM:  T(C): 37.3 (01-01-21 @ 07:41), Max: 38.1 (12-31-20 @ 23:53)  HR: 65 (01-01-21 @ 07:41) (63 - 83)  BP: 120/53 (01-01-21 @ 07:41) (120/53 - 162/61)  RR: 18 (01-01-21 @ 07:41) (18 - 19)  SpO2: 95% (01-01-21 @ 07:41) (93% - 100%)        Appearance: Normal	  HEENT:   Normal oral mucosa, PERRL, EOMI	  Lymphatic: No lymphadenopathy  Cardiovascular: Normal S1 S2, No JVD, No murmurs, No edema  Respiratory: Lungs clear to auscultation	  Psychiatry: A & O x 3, Mood & affect appropriate  Gastrointestinal:  Soft, Non-tender, + BS	  Skin: No rashes, No ecchymoses, No cyanosis	  Neurologic: Non-focal  Extremities: Normal range of motion, No clubbing, cyanosis or edema  Vascular: Peripheral pulses palpable 2+ bilaterally    MEDICATIONS  (STANDING):  atorvastatin 40 milliGRAM(s) Oral at bedtime  dextrose 40% Gel 15 Gram(s) Oral once  dextrose 5%. 1000 milliLiter(s) (50 mL/Hr) IV Continuous <Continuous>  dextrose 5%. 1000 milliLiter(s) (100 mL/Hr) IV Continuous <Continuous>  dextrose 50% Injectable 25 Gram(s) IV Push once  enoxaparin Injectable 40 milliGRAM(s) SubCutaneous daily  ergocalciferol 18684 Unit(s) Oral <User Schedule>  gabapentin 100 milliGRAM(s) Oral every 12 hours  glucagon  Injectable 1 milliGRAM(s) IntraMuscular once  insulin glargine Injectable (LANTUS) 10 Unit(s) SubCutaneous at bedtime  insulin lispro (ADMELOG) corrective regimen sliding scale   SubCutaneous Before meals and at bedtime  insulin lispro Injectable (ADMELOG) 3 Unit(s) SubCutaneous three times a day before meals  labetalol 100 milliGRAM(s) Oral two times a day  losartan 25 milliGRAM(s) Oral daily  naproxen 250 milliGRAM(s) Oral every 12 hours  pantoprazole    Tablet 40 milliGRAM(s) Oral before breakfast  polyethylene glycol 3350 17 Gram(s) Oral daily  sodium chloride 0.9%. 1000 milliLiter(s) (70 mL/Hr) IV Continuous <Continuous>      	  	  LABS:	 	    CARDIAC MARKERS:  CARDIAC MARKERS ( 31 Dec 2020 06:47 )  0.108 ng/mL / x     / 1068 U/L / x     / x      CARDIAC MARKERS ( 31 Dec 2020 01:17 )  0.125 ng/mL / x     / 1048 U/L / x     / x      CARDIAC MARKERS ( 30 Dec 2020 19:30 )  0.116 ng/mL / x     / 1414 U/L / x     / x      CARDIAC MARKERS ( 30 Dec 2020 12:02 )  0.043 ng/mL / x     / 2416 U/L / x     / x                                    11.4   6.39  )-----------( 198      ( 01 Jan 2021 10:44 )             35.3     12-31    134<L>  |  102  |  24<H>  ----------------------------<  259<H>  3.7   |  22  |  0.68    Ca    7.8<L>      31 Dec 2020 15:37  Phos  1.3     12-31  Mg     2.5     12-31    TPro  7.1  /  Alb  2.9<L>  /  TBili  0.7  /  DBili  x   /  AST  41<H>  /  ALT  42  /  AlkPhos  91  12-31    proBNP: Serum Pro-Brain Natriuretic Peptide: 3429 pg/mL (12-30 @ 19:30)    Lipid Profile: Cholesterol 256  LDL --  HDL 41    Cholesterol 255  LDL --  HDL 47      HgA1c:   TSH: Thyroid Stimulating Hormone, Serum: 1.44 uU/mL (12-31 @ 06:47)  Thyroid Stimulating Hormone, Serum: 1.20 uU/mL (12-30 @ 19:30)      	      chVitamin D, 25-Hydroxy: 14.3: VITD Interpretive Data Result:

## 2021-01-01 NOTE — PROGRESS NOTE ADULT - PROBLEM SELECTOR PLAN 4
- New onset DM  - Endo recommends Lantus 10 units at bedtime and Admelog 3 units before meals   - [employer] -180  - FS before meals and at bedtime  - Nutritionist consulted - New onset DM  - C/w Lantus 10 units at bedtime and Admelog 3 units before meals   - [employer] -180  - FS before meals and at bedtime  - Nutritionist consulted  - Pina Wiley

## 2021-01-01 NOTE — PROGRESS NOTE ADULT - ASSESSMENT
75 year old Female from home walks w/ walker has PMHx of schizophrenia on no meds, HTN walks presented to ED after fall, admitted to tele for rhabdomyolysis

## 2021-01-01 NOTE — PROGRESS NOTE ADULT - SUBJECTIVE AND OBJECTIVE BOX
PGY-1 Progress Note discussed with attending    PAGER #: [[phone]] TILL 5:00 PM  PLEASE CONTACT ON CALL TEAM:  - On Call Team (Please refer to Elisa) FROM 5:00 PM - 8:30PM  - Nightfloat Team FROM 8:30 -7:30 AM    CHIEF COMPLAINT & BRIEF HOSPITAL COURSE:  75 year old Female with PMHx of schizophrenia not on any meds, HTN, walks with a walker  presented to the ED after fall on 12/29 and generalized weakness. Patient admits that she was going to get the news paper then she slipped and was not able to stand up by herself, staying on the floor for almost a day until her super found her on floor. Patient reports no LOC, chest pain or shortness of breath. Patient endorses pain to the hip, denies numbness, focal weakness, diarrhea, constipation or abdominal pain, respiratory or urinary symptoms.  Patient admitted for fall and rhabdomyolysis   On admission, CK 2416, started on IVF, trending down, EKG NSR, A1c 10.8 endo on board, vitamin D low, started on oral supplements, abnormal high lipid profile, started on Statin, CTH negative, CT cervical spine no fractures, doppler LE negative for DVT, CXR negative for active pathology.   BP has been around 180s, was on amlodipine, changed by cardio to labetalol and losartan.   Patient with low potassium and phosphorus replaced as needed.    Pending Echo, nutritionist and PT evaluation.    INTERVAL HPI/OVERNIGHT EVENTS: patient denies any overnight events       MEDICATIONS  (STANDING):  atorvastatin 40 milliGRAM(s) Oral at bedtime  dextrose 40% Gel 15 Gram(s) Oral once  dextrose 5%. 1000 milliLiter(s) (50 mL/Hr) IV Continuous <Continuous>  dextrose 5%. 1000 milliLiter(s) (100 mL/Hr) IV Continuous <Continuous>  dextrose 50% Injectable 25 Gram(s) IV Push once  enoxaparin Injectable 40 milliGRAM(s) SubCutaneous daily  ergocalciferol 09512 Unit(s) Oral <User Schedule>  gabapentin 100 milliGRAM(s) Oral every 12 hours  glucagon  Injectable 1 milliGRAM(s) IntraMuscular once  insulin glargine Injectable (LANTUS) 10 Unit(s) SubCutaneous at bedtime  insulin lispro (ADMELOG) corrective regimen sliding scale   SubCutaneous Before meals and at bedtime  insulin lispro Injectable (ADMELOG) 3 Unit(s) SubCutaneous three times a day before meals  labetalol 100 milliGRAM(s) Oral two times a day  losartan 25 milliGRAM(s) Oral daily  naproxen 250 milliGRAM(s) Oral every 12 hours  pantoprazole    Tablet 40 milliGRAM(s) Oral before breakfast  polyethylene glycol 3350 17 Gram(s) Oral daily  sodium chloride 0.9%. 1000 milliLiter(s) (70 mL/Hr) IV Continuous <Continuous>    MEDICATIONS  (PRN):  acetaminophen   Tablet .. 650 milliGRAM(s) Oral every 6 hours PRN Temp greater or equal to 38C (100.4F), Mild Pain (1 - 3)  bisacodyl 5 milliGRAM(s) Oral every 12 hours PRN Constipation  cyclobenzaprine 5 milliGRAM(s) Oral three times a day PRN Muscle Spasm  traMADol 25 milliGRAM(s) Oral every 8 hours PRN Severe Pain (7 - 10)      Vital Signs Last 24 Hrs  T(C): 37.1 (01 Jan 2021 11:30), Max: 38.1 (31 Dec 2020 23:53)  T(F): 98.7 (01 Jan 2021 11:30), Max: 100.5 (31 Dec 2020 23:53)  HR: 59 (01 Jan 2021 11:30) (59 - 82)  BP: 116/54 (01 Jan 2021 11:30) (116/54 - 162/61)  BP(mean): --  RR: 18 (01 Jan 2021 11:30) (18 - 18)  SpO2: 97% (01 Jan 2021 11:30) (93% - 98%)    PHYSICAL EXAMINATION:  GENERAL: NAD, overweight   HEAD:  Atraumatic, Normocephalic  EYES:  conjunctiva and sclera clear  NECK: Supple, No JVD, Normal thyroid  CHEST/LUNG: Clear to auscultation. Clear to percussion bilaterally; No rales, rhonchi, wheezing, or rubs  HEART: Regular rate and rhythm; No murmurs, rubs, or gallops  ABDOMEN: Soft, Nontender, Nondistended; Bowel sounds present, no masses or pain on palpation   NERVOUS SYSTEM:  Alert & Oriented X3  EXTREMITIES:  2+ Peripheral Pulses, No clubbing, cyanosis, or edema. Bilateral hip pain and back pain without deformities   SKIN: warm dry                          11.4   6.39  )-----------( 198      ( 01 Jan 2021 10:44 )             35.3     01-01    132<L>  |  101  |  21<H>  ----------------------------<  285<H>  3.8   |  22  |  0.60    Ca    7.5<L>      01 Jan 2021 10:44  Phos  1.3     12-31  Mg     2.5     12-31    TPro  7.1  /  Alb  2.9<L>  /  TBili  0.7  /  DBili  x   /  AST  41<H>  /  ALT  42  /  AlkPhos  91  12-31    LIVER FUNCTIONS - ( 31 Dec 2020 01:20 )  Alb: 2.9 g/dL / Pro: 7.1 g/dL / ALK PHOS: 91 U/L / ALT: 42 U/L DA / AST: 41 U/L / GGT: x           CARDIAC MARKERS ( 01 Jan 2021 10:44 )  x     / x     / 641 U/L / x     / x      CARDIAC MARKERS ( 31 Dec 2020 06:47 )  0.108 ng/mL / x     / 1068 U/L / x     / x      CARDIAC MARKERS ( 31 Dec 2020 01:17 )  0.125 ng/mL / x     / 1048 U/L / x     / x      CARDIAC MARKERS ( 30 Dec 2020 19:30 )  0.116 ng/mL / x     / 1414 U/L / x     / x      CARDIAC MARKERS ( 30 Dec 2020 12:02 )  0.043 ng/mL / x     / 2416 U/L / x     / x          PT/INR - ( 30 Dec 2020 12:02 )   PT: 13.2 sec;   INR: 1.12 ratio         PTT - ( 30 Dec 2020 12:02 )  PTT:32.8 sec    I&O's Summary        Culture - Blood (collected 31 Dec 2020 06:37)  Source: .Blood Blood-Peripheral  Preliminary Report (01 Jan 2021 07:01):    No growth to date.    Culture - Blood (collected 31 Dec 2020 06:37)  Source: .Blood Blood-Peripheral  Preliminary Report (01 Jan 2021 07:01):    No growth to date.                         PGY-1 Progress Note discussed with attending    PAGER #: [[phone]] TILL 5:00 PM  PLEASE CONTACT ON CALL TEAM:  - On Call Team (Please refer to Elisa) FROM 5:00 PM - 8:30PM  - Nightfloat Team FROM 8:30 -7:30 AM    CHIEF COMPLAINT & BRIEF HOSPITAL COURSE:  75 year old Female with PMHx of schizophrenia not on any meds, HTN, walks with a walker  presented to the ED after fall on 12/29 and generalized weakness. Patient admits that she was going to get the news paper then she slipped and was not able to stand up by herself, staying on the floor for almost a day until her super found her on floor. Patient reports no LOC, chest pain or shortness of breath. Patient endorses pain to the hip, denies numbness, focal weakness, diarrhea, constipation or abdominal pain, respiratory or urinary symptoms.  Patient admitted for fall and rhabdomyolysis   On admission, CK 2416, started on IVF, trending down, EKG NSR, A1c 10.8 endo on board, vitamin D low, started on oral supplements, abnormal high lipid profile, started on Statin, CTH negative, CT cervical spine no fractures, doppler LE negative for DVT, CXR negative for active pathology.   BP has been around 180s, was on amlodipine, changed by cardio to labetalol and losartan.   Patient with low potassium and phosphorus replaced as needed.    Pending Echo, nutritionist and PT recommends DAVID.    INTERVAL HPI/OVERNIGHT EVENTS: patient denies any overnight events, pain is better controlled       MEDICATIONS  (STANDING):  atorvastatin 40 milliGRAM(s) Oral at bedtime  dextrose 40% Gel 15 Gram(s) Oral once  dextrose 5%. 1000 milliLiter(s) (50 mL/Hr) IV Continuous <Continuous>  dextrose 5%. 1000 milliLiter(s) (100 mL/Hr) IV Continuous <Continuous>  dextrose 50% Injectable 25 Gram(s) IV Push once  enoxaparin Injectable 40 milliGRAM(s) SubCutaneous daily  ergocalciferol 86953 Unit(s) Oral <User Schedule>  gabapentin 100 milliGRAM(s) Oral every 12 hours  glucagon  Injectable 1 milliGRAM(s) IntraMuscular once  insulin glargine Injectable (LANTUS) 10 Unit(s) SubCutaneous at bedtime  insulin lispro (ADMELOG) corrective regimen sliding scale   SubCutaneous Before meals and at bedtime  insulin lispro Injectable (ADMELOG) 3 Unit(s) SubCutaneous three times a day before meals  labetalol 100 milliGRAM(s) Oral two times a day  losartan 25 milliGRAM(s) Oral daily  naproxen 250 milliGRAM(s) Oral every 12 hours  pantoprazole    Tablet 40 milliGRAM(s) Oral before breakfast  polyethylene glycol 3350 17 Gram(s) Oral daily  sodium chloride 0.9%. 1000 milliLiter(s) (70 mL/Hr) IV Continuous <Continuous>    MEDICATIONS  (PRN):  acetaminophen   Tablet .. 650 milliGRAM(s) Oral every 6 hours PRN Temp greater or equal to 38C (100.4F), Mild Pain (1 - 3)  bisacodyl 5 milliGRAM(s) Oral every 12 hours PRN Constipation  cyclobenzaprine 5 milliGRAM(s) Oral three times a day PRN Muscle Spasm  traMADol 25 milliGRAM(s) Oral every 8 hours PRN Severe Pain (7 - 10)      Vital Signs Last 24 Hrs  T(C): 37.1 (01 Jan 2021 11:30), Max: 38.1 (31 Dec 2020 23:53)  T(F): 98.7 (01 Jan 2021 11:30), Max: 100.5 (31 Dec 2020 23:53)  HR: 59 (01 Jan 2021 11:30) (59 - 82)  BP: 116/54 (01 Jan 2021 11:30) (116/54 - 162/61)  BP(mean): --  RR: 18 (01 Jan 2021 11:30) (18 - 18)  SpO2: 97% (01 Jan 2021 11:30) (93% - 98%)    PHYSICAL EXAMINATION:  GENERAL: NAD, overweight   HEAD:  Atraumatic, Normocephalic  EYES:  conjunctiva and sclera clear  NECK: Supple, No JVD, Normal thyroid  CHEST/LUNG: Clear to auscultation. Clear to percussion bilaterally; No rales, rhonchi, wheezing, or rubs  HEART: Regular rate and rhythm; No murmurs, rubs, or gallops  ABDOMEN: Soft, Nontender, Nondistended; Bowel sounds present, no masses or pain on palpation   NERVOUS SYSTEM:  Alert & Oriented X3  EXTREMITIES:  2+ Peripheral Pulses, No clubbing, cyanosis, or edema. Bilateral hip pain and back pain without deformities   SKIN: warm dry                          11.4   6.39  )-----------( 198      ( 01 Jan 2021 10:44 )             35.3     01-01    132<L>  |  101  |  21<H>  ----------------------------<  285<H>  3.8   |  22  |  0.60    Ca    7.5<L>      01 Jan 2021 10:44  Phos  1.3     12-31  Mg     2.5     12-31    TPro  7.1  /  Alb  2.9<L>  /  TBili  0.7  /  DBili  x   /  AST  41<H>  /  ALT  42  /  AlkPhos  91  12-31    LIVER FUNCTIONS - ( 31 Dec 2020 01:20 )  Alb: 2.9 g/dL / Pro: 7.1 g/dL / ALK PHOS: 91 U/L / ALT: 42 U/L DA / AST: 41 U/L / GGT: x           CARDIAC MARKERS ( 01 Jan 2021 10:44 )  x     / x     / 641 U/L / x     / x      CARDIAC MARKERS ( 31 Dec 2020 06:47 )  0.108 ng/mL / x     / 1068 U/L / x     / x      CARDIAC MARKERS ( 31 Dec 2020 01:17 )  0.125 ng/mL / x     / 1048 U/L / x     / x      CARDIAC MARKERS ( 30 Dec 2020 19:30 )  0.116 ng/mL / x     / 1414 U/L / x     / x      CARDIAC MARKERS ( 30 Dec 2020 12:02 )  0.043 ng/mL / x     / 2416 U/L / x     / x          PT/INR - ( 30 Dec 2020 12:02 )   PT: 13.2 sec;   INR: 1.12 ratio         PTT - ( 30 Dec 2020 12:02 )  PTT:32.8 sec    I&O's Summary        Culture - Blood (collected 31 Dec 2020 06:37)  Source: .Blood Blood-Peripheral  Preliminary Report (01 Jan 2021 07:01):    No growth to date.    Culture - Blood (collected 31 Dec 2020 06:37)  Source: .Blood Blood-Peripheral  Preliminary Report (01 Jan 2021 07:01):    No growth to date.

## 2021-01-02 LAB
ANION GAP SERPL CALC-SCNC: 9 MMOL/L — SIGNIFICANT CHANGE UP (ref 5–17)
BUN SERPL-MCNC: 14 MG/DL — SIGNIFICANT CHANGE UP (ref 7–18)
CALCIUM SERPL-MCNC: 7.5 MG/DL — LOW (ref 8.4–10.5)
CHLORIDE SERPL-SCNC: 108 MMOL/L — SIGNIFICANT CHANGE UP (ref 96–108)
CK SERPL-CCNC: 319 U/L — HIGH (ref 21–215)
CO2 SERPL-SCNC: 21 MMOL/L — LOW (ref 22–31)
CREAT SERPL-MCNC: 0.49 MG/DL — LOW (ref 0.5–1.3)
GLUCOSE BLDC GLUCOMTR-MCNC: 141 MG/DL — HIGH (ref 70–99)
GLUCOSE BLDC GLUCOMTR-MCNC: 155 MG/DL — HIGH (ref 70–99)
GLUCOSE BLDC GLUCOMTR-MCNC: 160 MG/DL — HIGH (ref 70–99)
GLUCOSE BLDC GLUCOMTR-MCNC: 204 MG/DL — HIGH (ref 70–99)
GLUCOSE SERPL-MCNC: 137 MG/DL — HIGH (ref 70–99)
HCT VFR BLD CALC: 34.4 % — LOW (ref 34.5–45)
HGB BLD-MCNC: 11 G/DL — LOW (ref 11.5–15.5)
MAGNESIUM SERPL-MCNC: 2.2 MG/DL — SIGNIFICANT CHANGE UP (ref 1.6–2.6)
MCHC RBC-ENTMCNC: 28.9 PG — SIGNIFICANT CHANGE UP (ref 27–34)
MCHC RBC-ENTMCNC: 32 GM/DL — SIGNIFICANT CHANGE UP (ref 32–36)
MCV RBC AUTO: 90.3 FL — SIGNIFICANT CHANGE UP (ref 80–100)
NRBC # BLD: 0 /100 WBCS — SIGNIFICANT CHANGE UP (ref 0–0)
PHOSPHATE SERPL-MCNC: 1.6 MG/DL — LOW (ref 2.5–4.5)
PLATELET # BLD AUTO: 173 K/UL — SIGNIFICANT CHANGE UP (ref 150–400)
POTASSIUM SERPL-MCNC: 4 MMOL/L — SIGNIFICANT CHANGE UP (ref 3.5–5.3)
POTASSIUM SERPL-SCNC: 4 MMOL/L — SIGNIFICANT CHANGE UP (ref 3.5–5.3)
RBC # BLD: 3.81 M/UL — SIGNIFICANT CHANGE UP (ref 3.8–5.2)
RBC # FLD: 12.7 % — SIGNIFICANT CHANGE UP (ref 10.3–14.5)
SODIUM SERPL-SCNC: 138 MMOL/L — SIGNIFICANT CHANGE UP (ref 135–145)
WBC # BLD: 6.25 K/UL — SIGNIFICANT CHANGE UP (ref 3.8–10.5)
WBC # FLD AUTO: 6.25 K/UL — SIGNIFICANT CHANGE UP (ref 3.8–10.5)

## 2021-01-02 PROCEDURE — 99232 SBSQ HOSP IP/OBS MODERATE 35: CPT

## 2021-01-02 RX ORDER — ACETAMINOPHEN 500 MG
1000 TABLET ORAL EVERY 8 HOURS
Refills: 0 | Status: COMPLETED | OUTPATIENT
Start: 2021-01-02 | End: 2021-01-05

## 2021-01-02 RX ORDER — GABAPENTIN 400 MG/1
200 CAPSULE ORAL EVERY 12 HOURS
Refills: 0 | Status: DISCONTINUED | OUTPATIENT
Start: 2021-01-02 | End: 2021-01-06

## 2021-01-02 RX ADMIN — INSULIN GLARGINE 10 UNIT(S): 100 INJECTION, SOLUTION SUBCUTANEOUS at 21:31

## 2021-01-02 RX ADMIN — ATORVASTATIN CALCIUM 40 MILLIGRAM(S): 80 TABLET, FILM COATED ORAL at 21:31

## 2021-01-02 RX ADMIN — Medication 3 UNIT(S): at 08:19

## 2021-01-02 RX ADMIN — Medication 250 MILLIGRAM(S): at 17:06

## 2021-01-02 RX ADMIN — TRAMADOL HYDROCHLORIDE 25 MILLIGRAM(S): 50 TABLET ORAL at 12:30

## 2021-01-02 RX ADMIN — PANTOPRAZOLE SODIUM 40 MILLIGRAM(S): 20 TABLET, DELAYED RELEASE ORAL at 06:41

## 2021-01-02 RX ADMIN — TRAMADOL HYDROCHLORIDE 25 MILLIGRAM(S): 50 TABLET ORAL at 18:52

## 2021-01-02 RX ADMIN — Medication 5 MILLIGRAM(S): at 21:34

## 2021-01-02 RX ADMIN — GABAPENTIN 100 MILLIGRAM(S): 400 CAPSULE ORAL at 06:42

## 2021-01-02 RX ADMIN — Medication 1: at 21:31

## 2021-01-02 RX ADMIN — Medication 3 UNIT(S): at 12:00

## 2021-01-02 RX ADMIN — Medication 250 MILLIGRAM(S): at 17:30

## 2021-01-02 RX ADMIN — LOSARTAN POTASSIUM 25 MILLIGRAM(S): 100 TABLET, FILM COATED ORAL at 06:41

## 2021-01-02 RX ADMIN — Medication 2: at 11:59

## 2021-01-02 RX ADMIN — TRAMADOL HYDROCHLORIDE 25 MILLIGRAM(S): 50 TABLET ORAL at 11:58

## 2021-01-02 RX ADMIN — GABAPENTIN 200 MILLIGRAM(S): 400 CAPSULE ORAL at 21:29

## 2021-01-02 RX ADMIN — Medication 1: at 17:06

## 2021-01-02 RX ADMIN — Medication 100 MILLIGRAM(S): at 17:06

## 2021-01-02 RX ADMIN — ENOXAPARIN SODIUM 40 MILLIGRAM(S): 100 INJECTION SUBCUTANEOUS at 12:00

## 2021-01-02 RX ADMIN — Medication 100 MILLIGRAM(S): at 06:41

## 2021-01-02 RX ADMIN — Medication 1000 MILLIGRAM(S): at 21:30

## 2021-01-02 RX ADMIN — TRAMADOL HYDROCHLORIDE 25 MILLIGRAM(S): 50 TABLET ORAL at 18:39

## 2021-01-02 RX ADMIN — POLYETHYLENE GLYCOL 3350 17 GRAM(S): 17 POWDER, FOR SOLUTION ORAL at 11:58

## 2021-01-02 RX ADMIN — Medication 250 MILLIGRAM(S): at 06:41

## 2021-01-02 RX ADMIN — Medication 1000 MILLIGRAM(S): at 22:30

## 2021-01-02 RX ADMIN — Medication 250 MILLIGRAM(S): at 07:34

## 2021-01-02 RX ADMIN — Medication 3 UNIT(S): at 17:07

## 2021-01-02 NOTE — PROGRESS NOTE ADULT - SUBJECTIVE AND OBJECTIVE BOX
PGY-1 Progress Note discussed with attending    PAGER #: [[phone]] TILL 5:00 PM  PLEASE CONTACT ON CALL TEAM:  - On Call Team (Please refer to Eilsa) FROM 5:00 PM - 8:30PM  - Nightfloat Team FROM 8:30 -7:30 AM    CHIEF COMPLAINT & BRIEF HOSPITAL COURSE:    INTERVAL HPI/OVERNIGHT EVENTS:     REVIEW OF SYSTEMS:  CONSTITUTIONAL: No fever, weight loss, or fatigue  RESPIRATORY: No cough, wheezing, chills or hemoptysis; No shortness of breath  CARDIOVASCULAR: No chest pain, palpitations, dizziness, or leg swelling  GASTROINTESTINAL: No abdominal pain. No nausea, vomiting, or hematemesis; No diarrhea or constipation. No melena or hematochezia.  GENITOURINARY: No dysuria or hematuria, urinary frequency  NEUROLOGICAL: No headaches, memory loss, loss of strength, numbness, or tremors  SKIN: No itching, burning, rashes, or lesions     Vital Signs Last 24 Hrs  T(C): 37 (02 Jan 2021 19:13), Max: 37 (02 Jan 2021 11:03)  T(F): 98.6 (02 Jan 2021 19:13), Max: 98.6 (02 Jan 2021 11:03)  HR: 60 (02 Jan 2021 19:13) (57 - 71)  BP: 122/63 (02 Jan 2021 19:13) (115/58 - 145/64)  BP(mean): --  RR: 20 (02 Jan 2021 19:13) (18 - 20)  SpO2: 95% (02 Jan 2021 19:13) (94% - 99%)    PHYSICAL EXAMINATION:  GENERAL: NAD, well built  HEAD:  Atraumatic, Normocephalic  EYES:  conjunctiva and sclera clear  NECK: Supple, No JVD, Normal thyroid  CHEST/LUNG: Clear to auscultation. Clear to percussion bilaterally; No rales, rhonchi, wheezing, or rubs  HEART: Regular rate and rhythm; No murmurs, rubs, or gallops  ABDOMEN: Soft, Nontender, Nondistended; Bowel sounds present  NERVOUS SYSTEM:  Alert & Oriented X3,    EXTREMITIES:  2+ Peripheral Pulses, No clubbing, cyanosis, or edema  SKIN: warm dry                          11.0   6.25  )-----------( 173      ( 02 Jan 2021 07:07 )             34.4     01-02    138  |  108  |  14  ----------------------------<  137<H>  4.0   |  21<L>  |  0.49<L>    Ca    7.5<L>      02 Jan 2021 07:07  Phos  1.6     01-02  Mg     2.2     01-02        CARDIAC MARKERS ( 02 Jan 2021 07:07 )  x     / x     / 319 U/L / x     / x      CARDIAC MARKERS ( 01 Jan 2021 10:44 )  x     / x     / 641 U/L / x     / x              CAPILLARY BLOOD GLUCOSE      RADIOLOGY & ADDITIONAL TESTS:                   PGY-1 Progress Note discussed with attending    PAGER #: [[phone]] TILL 5:00 PM  PLEASE CONTACT ON CALL TEAM:  - On Call Team (Please refer to Elisa) FROM 5:00 PM - 8:30PM  - Nightfloat Team FROM 8:30 -7:30 AM    CHIEF COMPLAINT & BRIEF HOSPITAL COURSE: 75 year old Female with PMHx of schizophrenia not on any meds, HTN, walks with a walker  presented to the ED after fall on 12/29 and generalized weakness. Patient admits that she was going to get the news paper then she slipped and was not able to stand up by herself, staying on the floor for almost a day until her super found her on floor. Patient reports no LOC, chest pain or shortness of breath. Patient endorses pain to the hip, denies numbness, focal weakness, diarrhea, constipation or abdominal pain, respiratory or urinary symptoms.  Patient admitted for fall and rhabdomyolysis   On admission, CK 2416, s/p IVF, trending down (319 on 1/2), EKG NSR, A1c 10.8 endo on board, vitamin D low, started on oral supplements, abnormal high lipid profile, started on Statin, CTH negative, CT cervical spine no fractures, doppler LE negative for DVT, CXR negative for active pathology.   BP has been around 180s, was on amlodipine, changed by cardio to labetalol and losartan.   Patient with low potassium and phosphorus replaced as needed.    Echo showed EF>55%, mild concentric LVH, mild DDF, trace AR and MR  Pending nutritionist and PT recommends DAVID.    REVIEW OF SYSTEMS:  CONSTITUTIONAL: No fever, weight loss, or fatigue  RESPIRATORY: No cough, wheezing, chills or hemoptysis; No shortness of breath  CARDIOVASCULAR: No chest pain, palpitations, dizziness, or leg swelling  GASTROINTESTINAL: No abdominal pain. No nausea, vomiting, or hematemesis; No diarrhea or constipation. No melena or hematochezia.  GENITOURINARY: No dysuria or hematuria, urinary frequency  NEUROLOGICAL: No headaches, memory loss, loss of strength, numbness, or tremors  SKIN: No itching, burning, rashes, or lesions     Vital Signs Last 24 Hrs  T(C): 37 (02 Jan 2021 19:13), Max: 37 (02 Jan 2021 11:03)  T(F): 98.6 (02 Jan 2021 19:13), Max: 98.6 (02 Jan 2021 11:03)  HR: 60 (02 Jan 2021 19:13) (57 - 71)  BP: 122/63 (02 Jan 2021 19:13) (115/58 - 145/64)  BP(mean): --  RR: 20 (02 Jan 2021 19:13) (18 - 20)  SpO2: 95% (02 Jan 2021 19:13) (94% - 99%)    PHYSICAL EXAMINATION:  GENERAL: NAD, well built  HEAD:  Atraumatic, Normocephalic  EYES:  conjunctiva and sclera clear  NECK: Supple, No JVD, Normal thyroid  CHEST/LUNG: Clear to auscultation. Clear to percussion bilaterally; No rales, rhonchi, wheezing, or rubs  HEART: Regular rate and rhythm; No murmurs, rubs, or gallops  ABDOMEN: Soft, Nontender, Nondistended; Bowel sounds present  NERVOUS SYSTEM:  Alert & Oriented X3,    EXTREMITIES:  2+ Peripheral Pulses, No clubbing, cyanosis, or edema  SKIN: warm dry                          11.0   6.25  )-----------( 173      ( 02 Jan 2021 07:07 )             34.4     01-02    138  |  108  |  14  ----------------------------<  137<H>  4.0   |  21<L>  |  0.49<L>    Ca    7.5<L>      02 Jan 2021 07:07  Phos  1.6     01-02  Mg     2.2     01-02        CARDIAC MARKERS ( 02 Jan 2021 07:07 )  x     / x     / 319 U/L / x     / x      CARDIAC MARKERS ( 01 Jan 2021 10:44 )  x     / x     / 641 U/L / x     / x              CAPILLARY BLOOD GLUCOSE      RADIOLOGY & ADDITIONAL TESTS:

## 2021-01-02 NOTE — PROGRESS NOTE ADULT - PROBLEM SELECTOR PLAN 1
Rhabdomyolysis. Recommendation: - pt s/p fall.  CK trending down  - pt with fever today  - Pain is generalized but mainly on right side of body  -Nonopioid Recommendations  - naproxen 250mg po q 12 hours 3 days only  - increase gabapentin to 200mg po q 12 hours  - acetaminophen 1000gram po q 8 hours for 3 days  - monitor bs, hgaic - 10  opioid recommendation  - tramadol po prn  Bowel Regimen   - miralax.

## 2021-01-02 NOTE — PROGRESS NOTE ADULT - SUBJECTIVE AND OBJECTIVE BOX
Patient is a 75y old  Female who presents with a chief complaint of Rhabdomyolysis (01 Jan 2021 11:05)    pt seen in tele [ x ], reg med floor [   ], bed [x  ], chair at bedside [   ], a+o x3 [ x ], lethargic [  ],    nad [x  ]      Allergies    Allergy Status Unknown        Vitals    T(F): 97.8 (01-02-21 @ 04:33), Max: 99.1 (01-01-21 @ 07:41)  HR: 57 (01-02-21 @ 04:33) (57 - 69)  BP: 134/44 (01-02-21 @ 04:33) (116/54 - 135/42)  RR: 18 (01-02-21 @ 04:33) (17 - 18)  SpO2: 99% (01-02-21 @ 04:33) (95% - 99%)  Wt(kg): --  CAPILLARY BLOOD GLUCOSE      POCT Blood Glucose.: 180 mg/dL (01 Jan 2021 21:28)      Labs                          11.4   6.39  )-----------( 198      ( 01 Jan 2021 10:44 )             35.3       01-01    132<L>  |  101  |  21<H>  ----------------------------<  285<H>  3.8   |  22  |  0.60    Ca    7.5<L>      01 Jan 2021 10:44  Phos  1.3     12-31  Mg     2.5     12-31        CARDIAC MARKERS ( 01 Jan 2021 10:44 )  x     / x     / 641 U/L / x     / x      CARDIAC MARKERS ( 31 Dec 2020 06:47 )  0.108 ng/mL / x     / 1068 U/L / x     / x            .Blood Blood-Peripheral  12-31 @ 06:37   No growth to date.  --  --          Radiology Results      Meds    MEDICATIONS  (STANDING):  atorvastatin 40 milliGRAM(s) Oral at bedtime  dextrose 40% Gel 15 Gram(s) Oral once  dextrose 5%. 1000 milliLiter(s) (50 mL/Hr) IV Continuous <Continuous>  dextrose 5%. 1000 milliLiter(s) (100 mL/Hr) IV Continuous <Continuous>  dextrose 50% Injectable 25 Gram(s) IV Push once  enoxaparin Injectable 40 milliGRAM(s) SubCutaneous daily  ergocalciferol 25240 Unit(s) Oral <User Schedule>  gabapentin 100 milliGRAM(s) Oral every 12 hours  glucagon  Injectable 1 milliGRAM(s) IntraMuscular once  insulin glargine Injectable (LANTUS) 10 Unit(s) SubCutaneous at bedtime  insulin lispro (ADMELOG) corrective regimen sliding scale   SubCutaneous Before meals and at bedtime  insulin lispro Injectable (ADMELOG) 3 Unit(s) SubCutaneous three times a day before meals  labetalol 100 milliGRAM(s) Oral two times a day  losartan 25 milliGRAM(s) Oral daily  naproxen 250 milliGRAM(s) Oral every 12 hours  pantoprazole    Tablet 40 milliGRAM(s) Oral before breakfast  polyethylene glycol 3350 17 Gram(s) Oral daily  sodium chloride 0.9%. 1000 milliLiter(s) (70 mL/Hr) IV Continuous <Continuous>      MEDICATIONS  (PRN):  acetaminophen   Tablet .. 650 milliGRAM(s) Oral every 6 hours PRN Temp greater or equal to 38C (100.4F), Mild Pain (1 - 3)  bisacodyl 5 milliGRAM(s) Oral every 12 hours PRN Constipation  cyclobenzaprine 5 milliGRAM(s) Oral three times a day PRN Muscle Spasm  traMADol 25 milliGRAM(s) Oral every 8 hours PRN Severe Pain (7 - 10)      Physical Exam    Neuro :  no focal deficits  Respiratory: CTA B/L  CV: RRR, S1S2, no murmurs,   Abdominal: Soft, NT, ND +BS,  Extremities: No edema, + peripheral pulses    ASSESSMENT      s/p fall  Rhabdomyolysis   uncontrolled htn  r/o acs  hyperglycemia   r/o dm   myalgia   hypokalemia  h/o Schizophrenia        PLAN      cont tele,   acs protocol,   trop x 3 noted above  cont aspirin, statin,   cardio f/u   cont labetalol and losartan  f/u echo   f/u ck  cont  ivf,   hgba1c 10.6 noted above  cont hss  endo f/u   Lantus 10 units hs   Ademlog  3units  TID pre meals with SS coverage.   Diabetic education   nutrition eval  phys tx eval noted and rec brunilda  pain mgmt eval  cont flexeril   supplement potassium as needed  cont current meds             Patient is a 75y old  Female who presents with a chief complaint of Rhabdomyolysis (01 Jan 2021 11:05)    pt seen in tele [ x ], reg med floor [   ], bed [x  ], chair at bedside [   ], a+o x3 [ x ], lethargic [  ],    nad [x  ]      Allergies    Allergy Status Unknown        Vitals    T(F): 97.8 (01-02-21 @ 04:33), Max: 99.1 (01-01-21 @ 07:41)  HR: 57 (01-02-21 @ 04:33) (57 - 69)  BP: 134/44 (01-02-21 @ 04:33) (116/54 - 135/42)  RR: 18 (01-02-21 @ 04:33) (17 - 18)  SpO2: 99% (01-02-21 @ 04:33) (95% - 99%)  Wt(kg): --  CAPILLARY BLOOD GLUCOSE      POCT Blood Glucose.: 180 mg/dL (01 Jan 2021 21:28)      Labs                          11.4   6.39  )-----------( 198      ( 01 Jan 2021 10:44 )             35.3       01-01    132<L>  |  101  |  21<H>  ----------------------------<  285<H>  3.8   |  22  |  0.60    Ca    7.5<L>      01 Jan 2021 10:44  Phos  1.3     12-31  Mg     2.5     12-31        CARDIAC MARKERS ( 01 Jan 2021 10:44 )  x     / x     / 641 U/L / x     / x      CARDIAC MARKERS ( 31 Dec 2020 06:47 )  0.108 ng/mL / x     / 1068 U/L / x     / x            .Blood Blood-Peripheral  12-31 @ 06:37   No growth to date.  --  --      < from: Transthoracic Echocardiogram (01.01.21 @ 07:23) >  1. Mitral annular calcification. Trace mitral  regurgitation.  2.  Trace aortic regurgitation.  3. Mild concentric left ventricular hypertrophy.  4. Normal left ventricular systolic function.   Mild  diastolic dysfunction (stage I).  5. Normal right ventricular size and function.  Ejection Fraction Visual Estimate: >55 %  < end of copied text >      Radiology Results      Meds    MEDICATIONS  (STANDING):  atorvastatin 40 milliGRAM(s) Oral at bedtime  dextrose 40% Gel 15 Gram(s) Oral once  dextrose 5%. 1000 milliLiter(s) (50 mL/Hr) IV Continuous <Continuous>  dextrose 5%. 1000 milliLiter(s) (100 mL/Hr) IV Continuous <Continuous>  dextrose 50% Injectable 25 Gram(s) IV Push once  enoxaparin Injectable 40 milliGRAM(s) SubCutaneous daily  ergocalciferol 13766 Unit(s) Oral <User Schedule>  gabapentin 100 milliGRAM(s) Oral every 12 hours  glucagon  Injectable 1 milliGRAM(s) IntraMuscular once  insulin glargine Injectable (LANTUS) 10 Unit(s) SubCutaneous at bedtime  insulin lispro (ADMELOG) corrective regimen sliding scale   SubCutaneous Before meals and at bedtime  insulin lispro Injectable (ADMELOG) 3 Unit(s) SubCutaneous three times a day before meals  labetalol 100 milliGRAM(s) Oral two times a day  losartan 25 milliGRAM(s) Oral daily  naproxen 250 milliGRAM(s) Oral every 12 hours  pantoprazole    Tablet 40 milliGRAM(s) Oral before breakfast  polyethylene glycol 3350 17 Gram(s) Oral daily  sodium chloride 0.9%. 1000 milliLiter(s) (70 mL/Hr) IV Continuous <Continuous>      MEDICATIONS  (PRN):  acetaminophen   Tablet .. 650 milliGRAM(s) Oral every 6 hours PRN Temp greater or equal to 38C (100.4F), Mild Pain (1 - 3)  bisacodyl 5 milliGRAM(s) Oral every 12 hours PRN Constipation  cyclobenzaprine 5 milliGRAM(s) Oral three times a day PRN Muscle Spasm  traMADol 25 milliGRAM(s) Oral every 8 hours PRN Severe Pain (7 - 10)      Physical Exam    Neuro :  no focal deficits  Respiratory: CTA B/L  CV: RRR, S1S2, no murmurs,   Abdominal: Soft, NT, ND +BS,  Extremities: No edema, + peripheral pulses    ASSESSMENT      s/p fall  Rhabdomyolysis   uncontrolled htn  r/o acs  hyperglycemia   r/o dm   myalgia   hypokalemia  h/o Schizophrenia        PLAN      cont tele,   acs protocol,   trop x 3 noted above  cont aspirin, statin,   cardio f/u   cont labetalol and losartan  echo Ejection Fraction Visual Estimate: >55 %, Mild diastolic dysfunction (stage I) noted above.  ck much improved  cont  ivf,   hgba1c 10.6 noted    cont hss  endo f/u   Lantus 10 units hs   Ademlog  3units  TID pre meals with SS coverage.   Diabetic education   nutrition eval  phys tx eval noted and rec brunilda  pain mgmt eval  cont flexeril   supplement potassium as needed  cont current meds

## 2021-01-02 NOTE — PROGRESS NOTE ADULT - PROBLEM SELECTOR PLAN 1
- patient presented with a fall and was left on the floor for a day   - On admission CK 2416, trending down, today 641  - EKG NSR  - S/p IVF 1 L NS bolus   - C/w IVF hydration 70 cc/hr x 24 hrs and reassess - patient presented with a fall and was left on the floor for a day   - On admission CK 2416, trending down, today 319  - EKG NSR  - S/p IVF 1 L NS bolus and IVF hydration

## 2021-01-02 NOTE — PROGRESS NOTE ADULT - PROBLEM SELECTOR PLAN 2
- Patient presented with a fall    - US doppler LE negative DVT   - Bilateral hip x ray showed no bone pathology   - CT head no acute event  - CT cervical spine no fractures   - C/w Tylenol, tramadol and flexeril for pain management   - Pain management on board   - F/u echo   - PT recommends DAVID - Patient presented with a fall    - US doppler LE negative DVT   - Bilateral hip x ray showed no bone pathology   - CT head no acute event  - CT cervical spine no fractures   - C/w Tylenol, tramadol and flexeril for pain management   - Pain management on board   - Echo showed EF>55%, mild concentric LVH, mild DDF, trace AR and MR  - PT recommends DAVID

## 2021-01-02 NOTE — PROGRESS NOTE ADULT - SUBJECTIVE AND OBJECTIVE BOX
CHIEF COMPLAINT:Patient is a 75y old  Female who presents with a chief complaint of Rhabdomyolysis.Pt appears comfortable,    	  REVIEW OF SYSTEMS:  CONSTITUTIONAL: No fever, weight loss, or fatigue  EYES: No eye pain, visual disturbances, or discharge  ENT:  No difficulty hearing, tinnitus, vertigo; No sinus or throat pain  NECK: No pain or stiffness  RESPIRATORY: No cough, wheezing, chills or hemoptysis; No Shortness of Breath  CARDIOVASCULAR: No chest pain, palpitations, passing out, dizziness, or leg swelling  GASTROINTESTINAL: No abdominal or epigastric pain. No nausea, vomiting, or hematemesis; No diarrhea or constipation. No melena or hematochezia.  GENITOURINARY: No dysuria, frequency, hematuria, or incontinence  NEUROLOGICAL: No headaches, memory loss, loss of strength, numbness, or tremors  SKIN: No itching, burning, rashes, or lesions   LYMPH Nodes: No enlarged glands  ENDOCRINE: No heat or cold intolerance; No hair loss  MUSCULOSKELETAL: No joint pain or swelling; No muscle, back, or extremity pain  PSYCHIATRIC: No depression, anxiety, mood swings, or difficulty sleeping  HEME/LYMPH: No easy bruising, or bleeding gums  ALLERGY AND IMMUNOLOGIC: No hives or eczema	        PHYSICAL EXAM:  T(C): 36.9 (01-02-21 @ 07:33), Max: 37.1 (01-01-21 @ 11:30)  HR: 65 (01-02-21 @ 07:33) (57 - 69)  BP: 145/64 (01-02-21 @ 07:33) (116/54 - 145/64)  RR: 20 (01-02-21 @ 07:33) (17 - 20)  SpO2: 95% (01-02-21 @ 07:33) (95% - 99%)  Wt(kg): --  I&O's Summary    01 Jan 2021 07:01  -  02 Jan 2021 07:00  --------------------------------------------------------  IN: 770 mL / OUT: 0 mL / NET: 770 mL        Appearance: Normal	  HEENT:   Normal oral mucosa, PERRL, EOMI	  Lymphatic: No lymphadenopathy  Cardiovascular: Normal S1 S2, No JVD, No murmurs, No edema  Respiratory: Lungs clear to auscultation	  Psychiatry: A & O x 3, Mood & affect appropriate  Gastrointestinal:  Soft, Non-tender, + BS	  Skin: No rashes, No ecchymoses, No cyanosis	  Neurologic: Non-focal  Extremities: Normal range of motion, No clubbing, cyanosis or edema  Vascular: Peripheral pulses palpable 2+ bilaterally    MEDICATIONS  (STANDING):  atorvastatin 40 milliGRAM(s) Oral at bedtime  dextrose 40% Gel 15 Gram(s) Oral once  dextrose 5%. 1000 milliLiter(s) (50 mL/Hr) IV Continuous <Continuous>  dextrose 5%. 1000 milliLiter(s) (100 mL/Hr) IV Continuous <Continuous>  dextrose 50% Injectable 25 Gram(s) IV Push once  enoxaparin Injectable 40 milliGRAM(s) SubCutaneous daily  ergocalciferol 55330 Unit(s) Oral <User Schedule>  gabapentin 100 milliGRAM(s) Oral every 12 hours  glucagon  Injectable 1 milliGRAM(s) IntraMuscular once  insulin glargine Injectable (LANTUS) 10 Unit(s) SubCutaneous at bedtime  insulin lispro (ADMELOG) corrective regimen sliding scale   SubCutaneous Before meals and at bedtime  insulin lispro Injectable (ADMELOG) 3 Unit(s) SubCutaneous three times a day before meals  labetalol 100 milliGRAM(s) Oral two times a day  losartan 25 milliGRAM(s) Oral daily  naproxen 250 milliGRAM(s) Oral every 12 hours  pantoprazole    Tablet 40 milliGRAM(s) Oral before breakfast  polyethylene glycol 3350 17 Gram(s) Oral daily      	  	  LABS:	 	      CARDIAC MARKERS ( 02 Jan 2021 07:07 )  x     / x     / 319 U/L / x     / x      CARDIAC MARKERS ( 01 Jan 2021 10:44 )  x     / x     / 641 U/L / x     / x                                    11.0   6.25  )-----------( 173      ( 02 Jan 2021 07:07 )             34.4     01-02    138  |  108  |  14  ----------------------------<  137<H>  4.0   |  21<L>  |  0.49<L>    Ca    7.5<L>      02 Jan 2021 07:07  Phos  1.6     01-02  Mg     2.2     01-02      proBNP: Serum Pro-Brain Natriuretic Peptide: 3429 pg/mL (12-30 @ 19:30)    Lipid Profile: Cholesterol 256  LDL --  HDL 41    Cholesterol 255  LDL --  HDL 47      HgA1c:   TSH: Thyroid Stimulating Hormone, Serum: 1.44 uU/mL (12-31 @ 06:47)  Thyroid Stimulating Hormone, Serum: 1.20 uU/mL (12-30 @ 19:30)      	      c< from: Transthoracic Echocardiogram (01.01.21 @ 07:23) >  OBSERVATIONS:  Mitral Valve: Mitral annular calcification. Trace mitral  regurgitation.  Aortic Root: Normal aortic root.  Aortic Valve: Normal trileaflet aortic valve. Trace aortic  regurgitation.  Left Atrium: Normal left atrium.  LA volume index = 28  cc/m2.  Left Ventricle: Normal left ventricular systolic function.   Mild diastolic dysfunction (stage I). Mild concentric left  ventricular hypertrophy.  Right Heart: Normal right atrium. Normal right ventricular  size and function. There is mild tricuspid regurgitation.  Pericardium/PleuraNormal pericardium with no pericardial  effusion.  Hemodynamic: RA Pressure is 10 mm Hg. RV systolic pressure  is 31 mm Hg.

## 2021-01-02 NOTE — PROGRESS NOTE ADULT - SUBJECTIVE AND OBJECTIVE BOX
Interval Events:  pt in nad    Allergies    Allergy Status Unknown    Intolerances      Endocrine/Metabolic Medications:  atorvastatin 40 milliGRAM(s) Oral at bedtime  dextrose 40% Gel 15 Gram(s) Oral once  dextrose 50% Injectable 25 Gram(s) IV Push once  glucagon  Injectable 1 milliGRAM(s) IntraMuscular once  insulin glargine Injectable (LANTUS) 10 Unit(s) SubCutaneous at bedtime  insulin lispro (ADMELOG) corrective regimen sliding scale   SubCutaneous Before meals and at bedtime  insulin lispro Injectable (ADMELOG) 3 Unit(s) SubCutaneous three times a day before meals      Vital Signs Last 24 Hrs  T(C): 37 (02 Jan 2021 11:03), Max: 37.1 (01 Jan 2021 15:50)  T(F): 98.6 (02 Jan 2021 11:03), Max: 98.8 (01 Jan 2021 19:17)  HR: 68 (02 Jan 2021 11:03) (57 - 69)  BP: 115/58 (02 Jan 2021 11:03) (115/58 - 145/64)  BP(mean): --  RR: 20 (02 Jan 2021 11:03) (17 - 20)  SpO2: 96% (02 Jan 2021 11:03) (95% - 99%)      PHYSICAL EXAM  All physical exam findings normal, except those marked:  General:	Alert, active, cooperative, NAD, well hydrated  .		[] Abnormal:  Neck		Normal: supple, no cervical adenopathy, no palpable thyroid  .		[] Abnormal:  Cardiovascular	Normal: regular rate, normal S1, S2, no murmurs  .		[] Abnormal:  Respiratory	Normal: no chest wall deformity, normal respiratory pattern, CTA B/L  .		[] Abnormal:  Abdominal	Normal: soft, ND, NT, bowel sounds present, no masses, no organomegaly  .		[] Abnormal:  		Normal normal genitalia, testes descended, circumcised/uncircumcised  .		Srinivas stage:			Breast srinivas:  .		Menstrual history:  .		[] Abnormal:  Extremities	Normal: FROM x4  .		[] Abnormal:  Skin		Normal: intact and not indurated, no rash, no acanthosis nigricans  .		[] Abnormal:  Neurologic	Normal: grossly intact  .		[] Abnormal:    LABS                        11.0   6.25  )-----------( 173      ( 02 Jan 2021 07:07 )             34.4                               138    |  108    |  14                  Calcium: 7.5   / iCa: x      (01-02 @ 07:07)    ----------------------------<  137       Magnesium: 2.2                              4.0     |  21     |  0.49             Phosphorous: 1.6        CAPILLARY BLOOD GLUCOSE      POCT Blood Glucose.: 204 mg/dL (02 Jan 2021 11:43)  POCT Blood Glucose.: 141 mg/dL (02 Jan 2021 07:56)  POCT Blood Glucose.: 180 mg/dL (01 Jan 2021 21:28)  POCT Blood Glucose.: 248 mg/dL (01 Jan 2021 16:28)        Assesment/plan    75 year old Female  with  PMHx of scizophrenia not on any meds,walks with a walker  presented to the ED after fall yesterday 12/29 and generalized weakness.    Endo is consulted for new diagnosis of DM.     1. New diagnosis of DM   A1c: 10.8  now better controlled   Continue Lantus 10 units hs   Ademlog  3units  TID pre meals with SS coverage.   Diabetic education   nutrition eval  d/w hs

## 2021-01-02 NOTE — PROGRESS NOTE ADULT - ASSESSMENT
Prescription Monitoring Program Registry  Welcome Lucio Jackson  ×  Update Personal Info  FAQ  Search Results Help  Help  ×If you receive an error when searching the  Registry clear your cache and log back in.  Using Chrome: Delete your browsing History.  Using Internet Explorer:  Clear your browsing data.   Patient Search   Multi-Patient Search   Reports   Drug Listing   Designation   My THEE Numbers  Data Detail Level: Printer-Friendly View Extended View  Confidential Drug Utilization Report  Search Terms: umu marymarie hernandez, 1945Search Date: 01/02/2021 15:57:21 PM  The Drug Utilization Report below displays all of the controlled substance prescriptions, if any, that your patient has filled in the last twelve months. The information displayed on this report is compiled from pharmacy submissions to the Department, and accurately reflects the information as submitted by the pharmacies.    This report was requested by: Lucio Jackson | Reference #: [id number]

## 2021-01-02 NOTE — PROGRESS NOTE ADULT - PROBLEM SELECTOR PLAN 4
- New onset DM  - C/w Lantus 10 units at bedtime and Admelog 3 units before meals   - [employer] -180  - FS before meals and at bedtime  - Nutritionist consulted  - Pina Wiley

## 2021-01-02 NOTE — PROGRESS NOTE ADULT - ASSESSMENT
75 year old Female  with  PMHx of schizophrenia not on any meds,walks with a walker  presented to the ED after fall on 12/29 and generalized weakness,new onset Dm,uncontrolled HTN and abnormal EKG.  1.Tele monitoring.  2.DM-Insulin,Endo f/u.  3.Echocardiogram.  4.HTN-cont labetalol 100mg bid and cozaar 25mg qd.  5.Lipid d/o-statin.  6.GI and DVT prophylaxis.

## 2021-01-02 NOTE — PROGRESS NOTE ADULT - SUBJECTIVE AND OBJECTIVE BOX
Source of information: , Chart review  Patient language: English  : n/a    CC:  generalized body pain    This is a 75yFemale patient who presents to the hospital for Patient is a 75y old  Female who presents with a chief complaint of Rhabdomyolysis (02 Jan 2021 11:54)    Pt s/p fall and was on the floor for several hours. Pt with history of schizophrenia.  + generalized body pain.  Pain mainly on right hip and right side of body.  No nausea or vomiting.  No chest pain or sob.     PAIN SCORE:     5/10    SCALE USED: (1-10 NRS)      PAST MEDICAL & SURGICAL HISTORY:  Schizophrenia    No significant past surgical history        FAMILY HISTORY:  No pertinent family history in first degree relatives          SOCIAL HISTORY:  [x ] Denies Smoking, Alcohol, or Drug Use    Allergies    Allergy Status Unknown    Intolerances        MEDICATIONS:    MEDICATIONS  (STANDING):  acetaminophen   Tablet .. 1000 milliGRAM(s) Oral every 8 hours  atorvastatin 40 milliGRAM(s) Oral at bedtime  dextrose 40% Gel 15 Gram(s) Oral once  dextrose 5%. 1000 milliLiter(s) (50 mL/Hr) IV Continuous <Continuous>  dextrose 5%. 1000 milliLiter(s) (100 mL/Hr) IV Continuous <Continuous>  dextrose 50% Injectable 25 Gram(s) IV Push once  enoxaparin Injectable 40 milliGRAM(s) SubCutaneous daily  ergocalciferol 22215 Unit(s) Oral <User Schedule>  gabapentin 200 milliGRAM(s) Oral every 12 hours  glucagon  Injectable 1 milliGRAM(s) IntraMuscular once  insulin glargine Injectable (LANTUS) 10 Unit(s) SubCutaneous at bedtime  insulin lispro (ADMELOG) corrective regimen sliding scale   SubCutaneous Before meals and at bedtime  insulin lispro Injectable (ADMELOG) 3 Unit(s) SubCutaneous three times a day before meals  labetalol 100 milliGRAM(s) Oral two times a day  losartan 25 milliGRAM(s) Oral daily  naproxen 250 milliGRAM(s) Oral every 12 hours  pantoprazole    Tablet 40 milliGRAM(s) Oral before breakfast  polyethylene glycol 3350 17 Gram(s) Oral daily    MEDICATIONS  (PRN):  bisacodyl 5 milliGRAM(s) Oral every 12 hours PRN Constipation  traMADol 25 milliGRAM(s) Oral every 8 hours PRN Severe Pain (7 - 10)      Vital Signs Last 24 Hrs  T(C): 36.8 (02 Jan 2021 15:37), Max: 37.1 (01 Jan 2021 19:17)  T(F): 98.3 (02 Jan 2021 15:37), Max: 98.8 (01 Jan 2021 19:17)  HR: 71 (02 Jan 2021 15:37) (57 - 71)  BP: 118/57 (02 Jan 2021 15:37) (115/58 - 145/64)  BP(mean): --  RR: 20 (02 Jan 2021 15:37) (17 - 20)  SpO2: 94% (02 Jan 2021 15:37) (94% - 99%)    LABS:                          11.0   6.25  )-----------( 173      ( 02 Jan 2021 07:07 )             34.4     01-02    138  |  108  |  14  ----------------------------<  137<H>  4.0   |  21<L>  |  0.49<L>    Ca    7.5<L>      02 Jan 2021 07:07  Phos  1.6     01-02  Mg     2.2     01-02            CAPILLARY BLOOD GLUCOSE      POCT Blood Glucose.: 204 mg/dL (02 Jan 2021 11:43)  POCT Blood Glucose.: 141 mg/dL (02 Jan 2021 07:56)  POCT Blood Glucose.: 180 mg/dL (01 Jan 2021 21:28)  POCT Blood Glucose.: 248 mg/dL (01 Jan 2021 16:28)      Radiology:  < from: Xray Hip 3-4 Views, Bilateral (12.30.20 @ 15:15) >    EXAM:  XR HIPS BI 3-4V                            PROCEDURE DATE:  12/30/2020          INTERPRETATION:  Bilateral hips    HISTORY: Patient fell     Two views of the right hip and two views of the left hip show no evidence of fracture nor destructive change. The joint spaces are maintained.    IMPRESSION: No acute bony pathology.    Note - This does not exclude fractures in perfect alignment and apposition as presence may be indicated at one to three weeks following callus formation.      Thank you for this referral.    < end of copied text >      Drug Screen:        REVIEW OF SYSTEMS:  CONSTITUTIONAL: No fever or fatigue  RESPIRATORY: No cough, wheezing, chills or hemoptysis; No shortness of breath  CARDIOVASCULAR: No chest pain, palpitations, dizziness, or leg swelling  GASTROINTESTINAL: + right sided lower abdominal pain   GENITOURINARY: No dysuria, frequency, hematuria, retention or incontinence  MUSCULOSKELETAL: + right hip and leg pain.  NEURO: No headaches, No numbness/tingling b/l LE, No weakness  PSYCHIATRIC: No depression, anxiety, mood swings, or difficulty sleeping    PHYSICAL EXAM:  GENERAL:  Alert & Oriented X3, NAD, Good concentration  CHEST/LUNG: Clear to auscultation bilaterally; No rales, rhonchi, wheezing, or rubs  HEART: Regular rate and rhythm; No murmurs, rubs, or gallops  ABDOMEN: Soft, Nontender, Nondistended; Bowel sounds present  EXTREMITIES:  2+ Peripheral Pulses, No cyanosis, or edema  MUSCULOSKELETAL: + decreased rom + right leg pain  SKIN: No rashes or lesions    Risk factors associated with adverse outcomes related to opioid treatment  [ ]  Concurrent benzodiazepine use  [ ]  History/ Active substance use or alcohol use disorder  [ ] Psychiatric co-morbidity  [ ] Sleep apnea  [ ] COPD  [ ] BMI> 35  [ ] Liver dysfunction  [ ] Renal dysfunction  [ ] CHF  [ ] Smoker  [x ]  Age > 60 years    [x ]  [place]  Reviewed and Copied to Chart. See below.    Plan of care and goal oriented pain management treatment options were discussed with patient and /or primary care giver; all questions and concerns were addressed and care was aligned with patient's wishes.    Educated patient on goal oriented pain management treatment options     01-02-21 @ 15:53

## 2021-01-03 LAB
ANION GAP SERPL CALC-SCNC: 8 MMOL/L — SIGNIFICANT CHANGE UP (ref 5–17)
BUN SERPL-MCNC: 14 MG/DL — SIGNIFICANT CHANGE UP (ref 7–18)
CALCIUM SERPL-MCNC: 7.8 MG/DL — LOW (ref 8.4–10.5)
CHLORIDE SERPL-SCNC: 107 MMOL/L — SIGNIFICANT CHANGE UP (ref 96–108)
CK SERPL-CCNC: 166 U/L — SIGNIFICANT CHANGE UP (ref 21–215)
CO2 SERPL-SCNC: 22 MMOL/L — SIGNIFICANT CHANGE UP (ref 22–31)
CREAT SERPL-MCNC: 0.56 MG/DL — SIGNIFICANT CHANGE UP (ref 0.5–1.3)
GLUCOSE BLDC GLUCOMTR-MCNC: 127 MG/DL — HIGH (ref 70–99)
GLUCOSE BLDC GLUCOMTR-MCNC: 175 MG/DL — HIGH (ref 70–99)
GLUCOSE BLDC GLUCOMTR-MCNC: 205 MG/DL — HIGH (ref 70–99)
GLUCOSE BLDC GLUCOMTR-MCNC: 226 MG/DL — HIGH (ref 70–99)
GLUCOSE SERPL-MCNC: 154 MG/DL — HIGH (ref 70–99)
HCT VFR BLD CALC: 33.9 % — LOW (ref 34.5–45)
HGB BLD-MCNC: 10.8 G/DL — LOW (ref 11.5–15.5)
MAGNESIUM SERPL-MCNC: 2.2 MG/DL — SIGNIFICANT CHANGE UP (ref 1.6–2.6)
MCHC RBC-ENTMCNC: 29 PG — SIGNIFICANT CHANGE UP (ref 27–34)
MCHC RBC-ENTMCNC: 31.9 GM/DL — LOW (ref 32–36)
MCV RBC AUTO: 90.9 FL — SIGNIFICANT CHANGE UP (ref 80–100)
NRBC # BLD: 0 /100 WBCS — SIGNIFICANT CHANGE UP (ref 0–0)
PHOSPHATE SERPL-MCNC: 1.9 MG/DL — LOW (ref 2.5–4.5)
PLATELET # BLD AUTO: 203 K/UL — SIGNIFICANT CHANGE UP (ref 150–400)
POTASSIUM SERPL-MCNC: 3.8 MMOL/L — SIGNIFICANT CHANGE UP (ref 3.5–5.3)
POTASSIUM SERPL-SCNC: 3.8 MMOL/L — SIGNIFICANT CHANGE UP (ref 3.5–5.3)
RBC # BLD: 3.73 M/UL — LOW (ref 3.8–5.2)
RBC # FLD: 12.9 % — SIGNIFICANT CHANGE UP (ref 10.3–14.5)
SODIUM SERPL-SCNC: 137 MMOL/L — SIGNIFICANT CHANGE UP (ref 135–145)
WBC # BLD: 7.55 K/UL — SIGNIFICANT CHANGE UP (ref 3.8–10.5)
WBC # FLD AUTO: 7.55 K/UL — SIGNIFICANT CHANGE UP (ref 3.8–10.5)

## 2021-01-03 RX ORDER — IRON SUCROSE 20 MG/ML
200 INJECTION, SOLUTION INTRAVENOUS ONCE
Refills: 0 | Status: COMPLETED | OUTPATIENT
Start: 2021-01-03 | End: 2021-01-03

## 2021-01-03 RX ADMIN — Medication 1000 MILLIGRAM(S): at 22:48

## 2021-01-03 RX ADMIN — Medication 1000 MILLIGRAM(S): at 06:17

## 2021-01-03 RX ADMIN — ENOXAPARIN SODIUM 40 MILLIGRAM(S): 100 INJECTION SUBCUTANEOUS at 14:32

## 2021-01-03 RX ADMIN — Medication 1000 MILLIGRAM(S): at 22:12

## 2021-01-03 RX ADMIN — Medication 100 MILLIGRAM(S): at 06:19

## 2021-01-03 RX ADMIN — Medication 3 UNIT(S): at 17:34

## 2021-01-03 RX ADMIN — Medication 1000 MILLIGRAM(S): at 07:46

## 2021-01-03 RX ADMIN — Medication 2: at 12:17

## 2021-01-03 RX ADMIN — Medication 1: at 22:07

## 2021-01-03 RX ADMIN — IRON SUCROSE 110 MILLIGRAM(S): 20 INJECTION, SOLUTION INTRAVENOUS at 17:49

## 2021-01-03 RX ADMIN — INSULIN GLARGINE 10 UNIT(S): 100 INJECTION, SOLUTION SUBCUTANEOUS at 22:06

## 2021-01-03 RX ADMIN — PANTOPRAZOLE SODIUM 40 MILLIGRAM(S): 20 TABLET, DELAYED RELEASE ORAL at 06:18

## 2021-01-03 RX ADMIN — Medication 3 UNIT(S): at 12:17

## 2021-01-03 RX ADMIN — Medication 250 MILLIGRAM(S): at 17:45

## 2021-01-03 RX ADMIN — Medication 250 MILLIGRAM(S): at 06:19

## 2021-01-03 RX ADMIN — GABAPENTIN 200 MILLIGRAM(S): 400 CAPSULE ORAL at 14:31

## 2021-01-03 RX ADMIN — POLYETHYLENE GLYCOL 3350 17 GRAM(S): 17 POWDER, FOR SOLUTION ORAL at 14:30

## 2021-01-03 RX ADMIN — Medication 2: at 17:33

## 2021-01-03 RX ADMIN — GABAPENTIN 200 MILLIGRAM(S): 400 CAPSULE ORAL at 22:12

## 2021-01-03 RX ADMIN — LOSARTAN POTASSIUM 25 MILLIGRAM(S): 100 TABLET, FILM COATED ORAL at 06:18

## 2021-01-03 RX ADMIN — Medication 250 MILLIGRAM(S): at 07:46

## 2021-01-03 RX ADMIN — ATORVASTATIN CALCIUM 40 MILLIGRAM(S): 80 TABLET, FILM COATED ORAL at 22:06

## 2021-01-03 NOTE — PROGRESS NOTE ADULT - SUBJECTIVE AND OBJECTIVE BOX
CHIEF COMPLAINT:Patient is a 75y old  Female who presents with a chief complaint of Rhabdomyolysis.Pt appears comfortable.    	  REVIEW OF SYSTEMS:  CONSTITUTIONAL: No fever, weight loss, or fatigue  EYES: No eye pain, visual disturbances, or discharge  ENT:  No difficulty hearing, tinnitus, vertigo; No sinus or throat pain  NECK: No pain or stiffness  RESPIRATORY: No cough, wheezing, chills or hemoptysis; No Shortness of Breath  CARDIOVASCULAR: No chest pain, palpitations, passing out, dizziness, or leg swelling  GASTROINTESTINAL: No abdominal or epigastric pain. No nausea, vomiting, or hematemesis; No diarrhea or constipation. No melena or hematochezia.  GENITOURINARY: No dysuria, frequency, hematuria, or incontinence  NEUROLOGICAL: No headaches, memory loss, loss of strength, numbness, or tremors  SKIN: No itching, burning, rashes, or lesions   LYMPH Nodes: No enlarged glands  ENDOCRINE: No heat or cold intolerance; No hair loss  MUSCULOSKELETAL: No joint pain or swelling; No muscle, back, or extremity pain  PSYCHIATRIC: No depression, anxiety, mood swings, or difficulty sleeping  HEME/LYMPH: No easy bruising, or bleeding gums  ALLERGY AND IMMUNOLOGIC: No hives or eczema	        PHYSICAL EXAM:  T(C): 36.3 (01-03-21 @ 08:00), Max: 37 (01-02-21 @ 11:03)  HR: 60 (01-03-21 @ 08:00) (58 - 72)  BP: 114/58 (01-03-21 @ 08:00) (112/53 - 147/66)  RR: 18 (01-03-21 @ 08:00) (18 - 20)  SpO2: 97% (01-03-21 @ 08:00) (94% - 97%)        Appearance: Normal	  HEENT:   Normal oral mucosa, PERRL, EOMI	  Lymphatic: No lymphadenopathy  Cardiovascular: Normal S1 S2, No JVD, No murmurs, No edema  Respiratory: Lungs clear to auscultation	  Psychiatry: A & O x 3, Mood & affect appropriate  Gastrointestinal:  Soft, Non-tender, + BS	  Skin: No rashes, No ecchymoses, No cyanosis	  Neurologic: Non-focal  Extremities: Normal range of motion, No clubbing, cyanosis or edema  Vascular: Peripheral pulses palpable 2+ bilaterally    MEDICATIONS  (STANDING):  acetaminophen   Tablet .. 1000 milliGRAM(s) Oral every 8 hours  atorvastatin 40 milliGRAM(s) Oral at bedtime  dextrose 40% Gel 15 Gram(s) Oral once  dextrose 5%. 1000 milliLiter(s) (50 mL/Hr) IV Continuous <Continuous>  dextrose 5%. 1000 milliLiter(s) (100 mL/Hr) IV Continuous <Continuous>  dextrose 50% Injectable 25 Gram(s) IV Push once  enoxaparin Injectable 40 milliGRAM(s) SubCutaneous daily  ergocalciferol 31586 Unit(s) Oral <User Schedule>  gabapentin 200 milliGRAM(s) Oral every 12 hours  glucagon  Injectable 1 milliGRAM(s) IntraMuscular once  insulin glargine Injectable (LANTUS) 10 Unit(s) SubCutaneous at bedtime  insulin lispro (ADMELOG) corrective regimen sliding scale   SubCutaneous Before meals and at bedtime  insulin lispro Injectable (ADMELOG) 3 Unit(s) SubCutaneous three times a day before meals  iron sucrose IVPB 200 milliGRAM(s) IV Intermittent once  labetalol 100 milliGRAM(s) Oral two times a day  losartan 25 milliGRAM(s) Oral daily  naproxen 250 milliGRAM(s) Oral every 12 hours  pantoprazole    Tablet 40 milliGRAM(s) Oral before breakfast  polyethylene glycol 3350 17 Gram(s) Oral daily    	  	  LABS:	 	    CARDIAC MARKERS:  CARDIAC MARKERS ( 03 Jan 2021 07:01 )  x     / x     / 166 U/L / x     / x      CARDIAC MARKERS ( 02 Jan 2021 07:07 )  x     / x     / 319 U/L / x     / x                                    10.8   7.55  )-----------( 203      ( 03 Jan 2021 07:01 )             33.9     01-03    137  |  107  |  14  ----------------------------<  154<H>  3.8   |  22  |  0.56    Ca    7.8<L>      03 Jan 2021 07:01  Phos  1.9     01-03  Mg     2.2     01-03      proBNP: Serum Pro-Brain Natriuretic Peptide: 3429 pg/mL (12-30 @ 19:30)    Lipid Profile: Cholesterol 256  LDL --  HDL 41    Cholesterol 255  LDL --  HDL 47        TSH: Thyroid Stimulating Hormone, Serum: 1.44 uU/mL (12-31 @ 06:47)  Thyroid Stimulating Hormone, Serum: 1.20 uU/mL (12-30 @ 19:30)      Transthoracic Echocardiogram (01.01.21 @ 07:23) >  OBSERVATIONS:  Mitral Valve: Mitral annular calcification. Trace mitral  regurgitation.  Aortic Root: Normal aortic root.  Aortic Valve: Normal trileaflet aortic valve. Trace aortic  regurgitation.  Left Atrium: Normal left atrium.  LA volume index = 28  cc/m2.  Left Ventricle: Normal left ventricular systolic function.   Mild diastolic dysfunction (stage I). Mild concentric left  ventricular hypertrophy.  Right Heart: Normal right atrium. Normal right ventricular  size and function. There is mild tricuspid regurgitation.  Pericardium/PleuraNormal pericardium with no pericardial  effusion.  Hemodynamic: RA Pressure is 10 mm Hg. RV systolic pressure  is 31 mm Hg.    	    Iron with Total Binding Capacity (12.30.20 @ 19:30)   Iron - Total Binding Capacity.: 270 ug/dL   % Saturation, Iron: 9 %   Iron Total, Serum: 25 ug/dL   Unsaturated Iron Binding Capacity: 245 ug/dL

## 2021-01-03 NOTE — PROGRESS NOTE ADULT - ASSESSMENT
75 year old Female  with  PMHx of schizophrenia not on any meds,walks with a walker  presented to the ED after fall on 12/29 and generalized weakness,new onset Dm,uncontrolled HTN and abnormal EKG.  1.Tele monitoring.  2.DM-Insulin,Endo f/u.  3.Fe def anemia-occult,CT abd and pelvis,GI eval.  4.HTN-cont labetalol 100mg bid and cozaar 25mg qd.  5.Lipid d/o-statin.  6.GI and DVT prophylaxis.

## 2021-01-03 NOTE — PROGRESS NOTE ADULT - SUBJECTIVE AND OBJECTIVE BOX
Patient is a 75y old  Female who presents with a chief complaint of Rhabdomyolysis (02 Jan 2021 20:25)    pt seen in tele [ x ], reg med floor [   ], bed [x  ], chair at bedside [   ], a+o x3 [ x ], lethargic [  ],    nad [x  ]      Allergies    Allergy Status Unknown        Vitals    T(F): 97.7 (01-03-21 @ 04:33), Max: 98.6 (01-02-21 @ 11:03)  HR: 58 (01-03-21 @ 04:33) (58 - 72)  BP: 147/66 (01-03-21 @ 04:33) (112/53 - 147/66)  RR: 19 (01-03-21 @ 04:33) (19 - 20)  SpO2: 97% (01-03-21 @ 04:33) (94% - 97%)  Wt(kg): --  CAPILLARY BLOOD GLUCOSE      POCT Blood Glucose.: 160 mg/dL (02 Jan 2021 20:49)      Labs                          11.0   6.25  )-----------( 173      ( 02 Jan 2021 07:07 )             34.4       01-02    138  |  108  |  14  ----------------------------<  137<H>  4.0   |  21<L>  |  0.49<L>    Ca    7.5<L>      02 Jan 2021 07:07  Phos  1.6     01-02  Mg     2.2     01-02        CARDIAC MARKERS ( 02 Jan 2021 07:07 )  x     / x     / 319 U/L / x     / x      CARDIAC MARKERS ( 01 Jan 2021 10:44 )  x     / x     / 641 U/L / x     / x            .Blood Blood-Peripheral  12-31 @ 06:37   No growth to date.  --  --          Radiology Results      Meds    MEDICATIONS  (STANDING):  acetaminophen   Tablet .. 1000 milliGRAM(s) Oral every 8 hours  atorvastatin 40 milliGRAM(s) Oral at bedtime  dextrose 40% Gel 15 Gram(s) Oral once  dextrose 5%. 1000 milliLiter(s) (50 mL/Hr) IV Continuous <Continuous>  dextrose 5%. 1000 milliLiter(s) (100 mL/Hr) IV Continuous <Continuous>  dextrose 50% Injectable 25 Gram(s) IV Push once  enoxaparin Injectable 40 milliGRAM(s) SubCutaneous daily  ergocalciferol 31622 Unit(s) Oral <User Schedule>  gabapentin 200 milliGRAM(s) Oral every 12 hours  glucagon  Injectable 1 milliGRAM(s) IntraMuscular once  insulin glargine Injectable (LANTUS) 10 Unit(s) SubCutaneous at bedtime  insulin lispro (ADMELOG) corrective regimen sliding scale   SubCutaneous Before meals and at bedtime  insulin lispro Injectable (ADMELOG) 3 Unit(s) SubCutaneous three times a day before meals  labetalol 100 milliGRAM(s) Oral two times a day  losartan 25 milliGRAM(s) Oral daily  naproxen 250 milliGRAM(s) Oral every 12 hours  pantoprazole    Tablet 40 milliGRAM(s) Oral before breakfast  polyethylene glycol 3350 17 Gram(s) Oral daily      MEDICATIONS  (PRN):  bisacodyl 5 milliGRAM(s) Oral every 12 hours PRN Constipation  traMADol 25 milliGRAM(s) Oral every 8 hours PRN Severe Pain (7 - 10)      Physical Exam    Neuro :  no focal deficits  Respiratory: CTA B/L  CV: RRR, S1S2, no murmurs,   Abdominal: Soft, NT, ND +BS,  Extremities: No edema, + peripheral pulses    ASSESSMENT      s/p fall  Rhabdomyolysis   uncontrolled htn  r/o acs  hyperglycemia   r/o dm   myalgia   hypokalemia  h/o Schizophrenia        PLAN      cont tele,   acs protocol,   trop x 3 noted above  cont aspirin, statin,   cardio f/u   cont labetalol and losartan  echo Ejection Fraction Visual Estimate: >55 %, Mild diastolic dysfunction (stage I) noted above.  ck much improved  cont  ivf,   hgba1c 10.6 noted    cont hss  endo f/u   Lantus 10 units hs   Ademlog  3units  TID pre meals with SS coverage.   Diabetic education   nutrition eval  phys tx eval noted and rec brunilda  pain mgmt eval  cont flexeril   supplement potassium as needed  cont current meds         Patient is a 75y old  Female who presents with a chief complaint of Rhabdomyolysis (02 Jan 2021 20:25)    pt seen in tele [ x ], reg med floor [   ], bed [x  ], chair at bedside [   ], a+o x3 [ x ], lethargic [  ],    nad [x  ]      Allergies    Allergy Status Unknown        Vitals    T(F): 97.7 (01-03-21 @ 04:33), Max: 98.6 (01-02-21 @ 11:03)  HR: 58 (01-03-21 @ 04:33) (58 - 72)  BP: 147/66 (01-03-21 @ 04:33) (112/53 - 147/66)  RR: 19 (01-03-21 @ 04:33) (19 - 20)  SpO2: 97% (01-03-21 @ 04:33) (94% - 97%)  Wt(kg): --  CAPILLARY BLOOD GLUCOSE      POCT Blood Glucose.: 160 mg/dL (02 Jan 2021 20:49)      Labs                          11.0   6.25  )-----------( 173      ( 02 Jan 2021 07:07 )             34.4       01-02    138  |  108  |  14  ----------------------------<  137<H>  4.0   |  21<L>  |  0.49<L>    Ca    7.5<L>      02 Jan 2021 07:07  Phos  1.6     01-02  Mg     2.2     01-02        CARDIAC MARKERS ( 02 Jan 2021 07:07 )  x     / x     / 319 U/L / x     / x      CARDIAC MARKERS ( 01 Jan 2021 10:44 )  x     / x     / 641 U/L / x     / x            .Blood Blood-Peripheral  12-31 @ 06:37   No growth to date.  --  --          Radiology Results      Meds    MEDICATIONS  (STANDING):  acetaminophen   Tablet .. 1000 milliGRAM(s) Oral every 8 hours  atorvastatin 40 milliGRAM(s) Oral at bedtime  dextrose 40% Gel 15 Gram(s) Oral once  dextrose 5%. 1000 milliLiter(s) (50 mL/Hr) IV Continuous <Continuous>  dextrose 5%. 1000 milliLiter(s) (100 mL/Hr) IV Continuous <Continuous>  dextrose 50% Injectable 25 Gram(s) IV Push once  enoxaparin Injectable 40 milliGRAM(s) SubCutaneous daily  ergocalciferol 89931 Unit(s) Oral <User Schedule>  gabapentin 200 milliGRAM(s) Oral every 12 hours  glucagon  Injectable 1 milliGRAM(s) IntraMuscular once  insulin glargine Injectable (LANTUS) 10 Unit(s) SubCutaneous at bedtime  insulin lispro (ADMELOG) corrective regimen sliding scale   SubCutaneous Before meals and at bedtime  insulin lispro Injectable (ADMELOG) 3 Unit(s) SubCutaneous three times a day before meals  labetalol 100 milliGRAM(s) Oral two times a day  losartan 25 milliGRAM(s) Oral daily  naproxen 250 milliGRAM(s) Oral every 12 hours  pantoprazole    Tablet 40 milliGRAM(s) Oral before breakfast  polyethylene glycol 3350 17 Gram(s) Oral daily      MEDICATIONS  (PRN):  bisacodyl 5 milliGRAM(s) Oral every 12 hours PRN Constipation  traMADol 25 milliGRAM(s) Oral every 8 hours PRN Severe Pain (7 - 10)      Physical Exam    Neuro :  no focal deficits  Respiratory: CTA B/L  CV: RRR, S1S2, no murmurs,   Abdominal: Soft, NT, ND +BS,  Extremities: No edema, + peripheral pulses    ASSESSMENT      s/p fall  Rhabdomyolysis   uncontrolled htn  r/o acs  hyperglycemia   r/o dm   myalgia   hypokalemia  h/o Schizophrenia        PLAN      cont tele,   acs protocol,   trop x 3 noted above  cont aspirin, statin,   cardio f/u   cont labetalol and losartan  echo Ejection Fraction Visual Estimate: >55 %, Mild diastolic dysfunction (stage I) noted.  ck much improved  cont  ivf,   hgba1c 10.6 noted    cont hss  endo f/u   Lantus 10 units hs   Ademlog  3units  TID pre meals with SS coverage.   Diabetic education   nutrition eval  phys tx eval noted and rec brunilda  pain mgmt eval  cont flexeril   supplement potassium as needed  cont current meds

## 2021-01-03 NOTE — CHART NOTE - NSCHARTNOTEFT_GEN_A_CORE
Call team notified 11am patient having trouble swallowing. Patient seen and examined at bedside. Endorses feeling of food being "too thick" and getting stuck in her chest. Requested thinner diet. Started pureed diet with thin liquids, will monitor tolerance. Primary team to follow in am.

## 2021-01-04 DIAGNOSIS — D64.9 ANEMIA, UNSPECIFIED: ICD-10-CM

## 2021-01-04 LAB
ANION GAP SERPL CALC-SCNC: 7 MMOL/L — SIGNIFICANT CHANGE UP (ref 5–17)
BUN SERPL-MCNC: 13 MG/DL — SIGNIFICANT CHANGE UP (ref 7–18)
CALCIUM SERPL-MCNC: 8.5 MG/DL — SIGNIFICANT CHANGE UP (ref 8.4–10.5)
CEA SERPL-MCNC: 1.5 NG/ML — SIGNIFICANT CHANGE UP (ref 0–3.8)
CHLORIDE SERPL-SCNC: 107 MMOL/L — SIGNIFICANT CHANGE UP (ref 96–108)
CO2 SERPL-SCNC: 26 MMOL/L — SIGNIFICANT CHANGE UP (ref 22–31)
CREAT SERPL-MCNC: 0.55 MG/DL — SIGNIFICANT CHANGE UP (ref 0.5–1.3)
GLUCOSE BLDC GLUCOMTR-MCNC: 128 MG/DL — HIGH (ref 70–99)
GLUCOSE BLDC GLUCOMTR-MCNC: 184 MG/DL — HIGH (ref 70–99)
GLUCOSE BLDC GLUCOMTR-MCNC: 246 MG/DL — HIGH (ref 70–99)
GLUCOSE BLDC GLUCOMTR-MCNC: 252 MG/DL — HIGH (ref 70–99)
GLUCOSE SERPL-MCNC: 126 MG/DL — HIGH (ref 70–99)
HCT VFR BLD CALC: 35.1 % — SIGNIFICANT CHANGE UP (ref 34.5–45)
HGB BLD-MCNC: 10.9 G/DL — LOW (ref 11.5–15.5)
MAGNESIUM SERPL-MCNC: 2.1 MG/DL — SIGNIFICANT CHANGE UP (ref 1.6–2.6)
MCHC RBC-ENTMCNC: 28.5 PG — SIGNIFICANT CHANGE UP (ref 27–34)
MCHC RBC-ENTMCNC: 31.1 GM/DL — LOW (ref 32–36)
MCV RBC AUTO: 91.6 FL — SIGNIFICANT CHANGE UP (ref 80–100)
NRBC # BLD: 0 /100 WBCS — SIGNIFICANT CHANGE UP (ref 0–0)
OB PNL STL: NEGATIVE — SIGNIFICANT CHANGE UP
OB PNL STL: NEGATIVE — SIGNIFICANT CHANGE UP
PHOSPHATE SERPL-MCNC: 2.8 MG/DL — SIGNIFICANT CHANGE UP (ref 2.5–4.5)
PLATELET # BLD AUTO: 239 K/UL — SIGNIFICANT CHANGE UP (ref 150–400)
POTASSIUM SERPL-MCNC: 4 MMOL/L — SIGNIFICANT CHANGE UP (ref 3.5–5.3)
POTASSIUM SERPL-SCNC: 4 MMOL/L — SIGNIFICANT CHANGE UP (ref 3.5–5.3)
RBC # BLD: 3.83 M/UL — SIGNIFICANT CHANGE UP (ref 3.8–5.2)
RBC # FLD: 13 % — SIGNIFICANT CHANGE UP (ref 10.3–14.5)
SARS-COV-2 RNA SPEC QL NAA+PROBE: SIGNIFICANT CHANGE UP
SODIUM SERPL-SCNC: 140 MMOL/L — SIGNIFICANT CHANGE UP (ref 135–145)
WBC # BLD: 7.91 K/UL — SIGNIFICANT CHANGE UP (ref 3.8–10.5)
WBC # FLD AUTO: 7.91 K/UL — SIGNIFICANT CHANGE UP (ref 3.8–10.5)

## 2021-01-04 PROCEDURE — 99231 SBSQ HOSP IP/OBS SF/LOW 25: CPT

## 2021-01-04 RX ORDER — IRON SUCROSE 20 MG/ML
200 INJECTION, SOLUTION INTRAVENOUS ONCE
Refills: 0 | Status: COMPLETED | OUTPATIENT
Start: 2021-01-04 | End: 2021-01-04

## 2021-01-04 RX ADMIN — ENOXAPARIN SODIUM 40 MILLIGRAM(S): 100 INJECTION SUBCUTANEOUS at 11:48

## 2021-01-04 RX ADMIN — Medication 250 MILLIGRAM(S): at 06:40

## 2021-01-04 RX ADMIN — TRAMADOL HYDROCHLORIDE 25 MILLIGRAM(S): 50 TABLET ORAL at 18:42

## 2021-01-04 RX ADMIN — Medication 100 MILLIGRAM(S): at 17:16

## 2021-01-04 RX ADMIN — Medication 3 UNIT(S): at 17:15

## 2021-01-04 RX ADMIN — IRON SUCROSE 110 MILLIGRAM(S): 20 INJECTION, SOLUTION INTRAVENOUS at 10:54

## 2021-01-04 RX ADMIN — Medication 2: at 12:13

## 2021-01-04 RX ADMIN — Medication 3: at 21:50

## 2021-01-04 RX ADMIN — PANTOPRAZOLE SODIUM 40 MILLIGRAM(S): 20 TABLET, DELAYED RELEASE ORAL at 06:03

## 2021-01-04 RX ADMIN — Medication 3 UNIT(S): at 08:21

## 2021-01-04 RX ADMIN — Medication 1000 MILLIGRAM(S): at 21:55

## 2021-01-04 RX ADMIN — INSULIN GLARGINE 10 UNIT(S): 100 INJECTION, SOLUTION SUBCUTANEOUS at 21:50

## 2021-01-04 RX ADMIN — Medication 1000 MILLIGRAM(S): at 13:30

## 2021-01-04 RX ADMIN — Medication 1000 MILLIGRAM(S): at 06:03

## 2021-01-04 RX ADMIN — POLYETHYLENE GLYCOL 3350 17 GRAM(S): 17 POWDER, FOR SOLUTION ORAL at 11:48

## 2021-01-04 RX ADMIN — ATORVASTATIN CALCIUM 40 MILLIGRAM(S): 80 TABLET, FILM COATED ORAL at 21:49

## 2021-01-04 RX ADMIN — Medication 250 MILLIGRAM(S): at 06:03

## 2021-01-04 RX ADMIN — Medication 1000 MILLIGRAM(S): at 06:40

## 2021-01-04 RX ADMIN — Medication 3 UNIT(S): at 12:14

## 2021-01-04 RX ADMIN — Medication 1000 MILLIGRAM(S): at 23:35

## 2021-01-04 RX ADMIN — Medication 1000 MILLIGRAM(S): at 14:35

## 2021-01-04 RX ADMIN — GABAPENTIN 200 MILLIGRAM(S): 400 CAPSULE ORAL at 21:49

## 2021-01-04 RX ADMIN — GABAPENTIN 200 MILLIGRAM(S): 400 CAPSULE ORAL at 10:54

## 2021-01-04 RX ADMIN — TRAMADOL HYDROCHLORIDE 25 MILLIGRAM(S): 50 TABLET ORAL at 17:20

## 2021-01-04 RX ADMIN — Medication 1: at 17:15

## 2021-01-04 NOTE — CONSULT NOTE ADULT - RS GEN PE MLT RESP DETAILS PC
airway patent/breath sounds equal/good air movement/respirations non-labored/no rales/no rhonchi/no wheezes

## 2021-01-04 NOTE — PROGRESS NOTE ADULT - PROBLEM SELECTOR PLAN 8
discussed with the patient and she needs more time to think about it.
discussed with the patient and she needs more time to think about it.
- Lipid profile abnormal high  - C/w Statin
discussed with the patient and she needs more time to think about it.

## 2021-01-04 NOTE — CONSULT NOTE ADULT - ASSESSMENT
1. Iron deficiency anemia  2. No evidence of acute GI blkeeding  3. R/o chronic GI bleeding  4. Constipation    Suggestions:    1. Monitor H/H  2. Transfuse PRBC as needed  3. Protonix daily  4. Check stool for occult blood X 3  5. Avoid NSAID   6. Check CEA  7. DVT prophylaxis

## 2021-01-04 NOTE — PROGRESS NOTE ADULT - SUBJECTIVE AND OBJECTIVE BOX
PGY-1 Progress Note discussed with attending    PAGER #: [[phone]] TILL 5:00 PM  PLEASE CONTACT ON CALL TEAM:  - On Call Team (Please refer to Elisa) FROM 5:00 PM - 8:30PM  - Nightfloat Team FROM 8:30 -7:30 AM    CHIEF COMPLAINT & BRIEF HOSPITAL COURSE:  75 year old Female with PMHx of schizophrenia not on any meds, HTN, walks with a walker  presented to the ED after fall on 12/29 and generalized weakness. Patient admits that she was going to get the news paper then she slipped and was not able to stand up by herself, staying on the floor for almost a day until her super found her on floor. Patient reports no LOC, chest pain or shortness of breath. Patient endorses pain to the hip, denies numbness, focal weakness, diarrhea, constipation or abdominal pain, respiratory or urinary symptoms.  Patient admitted for fall and rhabdomyolysis   On admission, CK 2416, started on IVF, trending down, EKG NSR, A1c 10.8 endo on board, vitamin D low, started on oral supplements, abnormal high lipid profile, started on Statin, CTH negative, CT cervical spine no fractures, doppler LE negative for DVT, CXR negative for active pathology.   BP has been around 180s, was on amlodipine, changed by cardio to labetalol and losartan.   Patient with low potassium and phosphorus replaced as needed.    Pending Echo, nutritionist and PT recommends DAVID.    INTERVAL HPI/OVERNIGHT EVENTS: patient refers she is feeling better, denies any discomfort     MEDICATIONS  (STANDING):  acetaminophen   Tablet .. 1000 milliGRAM(s) Oral every 8 hours  atorvastatin 40 milliGRAM(s) Oral at bedtime  dextrose 40% Gel 15 Gram(s) Oral once  dextrose 5%. 1000 milliLiter(s) (50 mL/Hr) IV Continuous <Continuous>  dextrose 5%. 1000 milliLiter(s) (100 mL/Hr) IV Continuous <Continuous>  dextrose 50% Injectable 25 Gram(s) IV Push once  enoxaparin Injectable 40 milliGRAM(s) SubCutaneous daily  ergocalciferol 93305 Unit(s) Oral <User Schedule>  gabapentin 200 milliGRAM(s) Oral every 12 hours  glucagon  Injectable 1 milliGRAM(s) IntraMuscular once  insulin glargine Injectable (LANTUS) 10 Unit(s) SubCutaneous at bedtime  insulin lispro (ADMELOG) corrective regimen sliding scale   SubCutaneous Before meals and at bedtime  insulin lispro Injectable (ADMELOG) 3 Unit(s) SubCutaneous three times a day before meals  labetalol 100 milliGRAM(s) Oral two times a day  losartan 25 milliGRAM(s) Oral daily  pantoprazole    Tablet 40 milliGRAM(s) Oral before breakfast  polyethylene glycol 3350 17 Gram(s) Oral daily    MEDICATIONS  (PRN):  bisacodyl 5 milliGRAM(s) Oral every 12 hours PRN Constipation  traMADol 25 milliGRAM(s) Oral every 8 hours PRN Severe Pain (7 - 10)      Vital Signs Last 24 Hrs  T(C): 36.5 (04 Jan 2021 11:44), Max: 36.8 (04 Jan 2021 04:34)  T(F): 97.7 (04 Jan 2021 11:44), Max: 98.2 (04 Jan 2021 04:34)  HR: 76 (04 Jan 2021 15:08) (57 - 76)  BP: 142/79 (04 Jan 2021 15:08) (113/57 - 171/46)  BP(mean): --  RR: 18 (04 Jan 2021 11:44) (18 - 18)  SpO2: 96% (04 Jan 2021 15:08) (94% - 98%)    PHYSICAL EXAMINATION:  HEAD:  Atraumatic, Normocephalic  EYES:  conjunctiva and sclera clear  NECK: Supple, No JVD, Normal thyroid  CHEST/LUNG: Clear to auscultation. Clear to percussion bilaterally; No rales, rhonchi, wheezing, or rubs  HEART: Regular rate and rhythm; No murmurs, rubs, or gallops  ABDOMEN: Soft, Nontender, Nondistended; Bowel sounds present, no masses or pain on palpation   NERVOUS SYSTEM:  Alert & Oriented X3  EXTREMITIES:  2+ Peripheral Pulses, No clubbing, cyanosis, or edema. Bilateral hip pain and back pain without deformities   SKIN: warm dry                          10.9   7.91  )-----------( 239      ( 04 Jan 2021 07:07 )             35.1     01-04    140  |  107  |  13  ----------------------------<  126<H>  4.0   |  26  |  0.55    Ca    8.5      04 Jan 2021 07:08  Phos  2.8     01-04  Mg     2.1     01-04        CARDIAC MARKERS ( 03 Jan 2021 07:01 )  x     / x     / 166 U/L / x     / x                           PGY-1 Progress Note discussed with attending    PAGER #: [[phone]] TILL 5:00 PM  PLEASE CONTACT ON CALL TEAM:  - On Call Team (Please refer to Elisa) FROM 5:00 PM - 8:30PM  - Nightfloat Team FROM 8:30 -7:30 AM    CHIEF COMPLAINT & BRIEF HOSPITAL COURSE:  75 year old Female with PMHx of schizophrenia not on any meds, HTN, walks with a walker  presented to the ED after fall on 12/29 and generalized weakness. Patient admits that she was going to get the news paper then she slipped and was not able to stand up by herself, staying on the floor for almost a day until her super found her on floor. Patient reports no LOC, chest pain or shortness of breath. Patient endorses pain to the hip, denies numbness, focal weakness, diarrhea, constipation or abdominal pain, respiratory or urinary symptoms.  Patient admitted for fall and rhabdomyolysis   On admission, CK 2416, started on IVF, trending down, EKG NSR, A1c 10.8 endo on board, vitamin D low, started on oral supplements, abnormal high lipid profile, started on Statin, CTH negative, CT cervical spine no fractures, doppler LE negative for DVT, CXR negative for active pathology.   BP has been around 180s, was on amlodipine, changed by cardio to labetalol and losartan.   Patient with low potassium and phosphorus replaced as needed.    Pending Echo, nutritionist and PT recommends DAVID.    INTERVAL HPI/OVERNIGHT EVENTS: patient refers she is feeling better, denies any discomfort     MEDICATIONS  (STANDING):  acetaminophen   Tablet .. 1000 milliGRAM(s) Oral every 8 hours  atorvastatin 40 milliGRAM(s) Oral at bedtime  dextrose 40% Gel 15 Gram(s) Oral once  dextrose 5%. 1000 milliLiter(s) (50 mL/Hr) IV Continuous <Continuous>  dextrose 5%. 1000 milliLiter(s) (100 mL/Hr) IV Continuous <Continuous>  dextrose 50% Injectable 25 Gram(s) IV Push once  enoxaparin Injectable 40 milliGRAM(s) SubCutaneous daily  ergocalciferol 52628 Unit(s) Oral <User Schedule>  gabapentin 200 milliGRAM(s) Oral every 12 hours  glucagon  Injectable 1 milliGRAM(s) IntraMuscular once  insulin glargine Injectable (LANTUS) 10 Unit(s) SubCutaneous at bedtime  insulin lispro (ADMELOG) corrective regimen sliding scale   SubCutaneous Before meals and at bedtime  insulin lispro Injectable (ADMELOG) 3 Unit(s) SubCutaneous three times a day before meals  labetalol 100 milliGRAM(s) Oral two times a day  losartan 25 milliGRAM(s) Oral daily  pantoprazole    Tablet 40 milliGRAM(s) Oral before breakfast  polyethylene glycol 3350 17 Gram(s) Oral daily    MEDICATIONS  (PRN):  bisacodyl 5 milliGRAM(s) Oral every 12 hours PRN Constipation  traMADol 25 milliGRAM(s) Oral every 8 hours PRN Severe Pain (7 - 10)      Vital Signs Last 24 Hrs  T(C): 36.5 (04 Jan 2021 11:44), Max: 36.8 (04 Jan 2021 04:34)  T(F): 97.7 (04 Jan 2021 11:44), Max: 98.2 (04 Jan 2021 04:34)  HR: 76 (04 Jan 2021 15:08) (57 - 76)  BP: 142/79 (04 Jan 2021 15:08) (113/57 - 171/46)  BP(mean): --  RR: 18 (04 Jan 2021 11:44) (18 - 18)  SpO2: 96% (04 Jan 2021 15:08) (94% - 98%)    PHYSICAL EXAMINATION:  HEAD:  Atraumatic, Normocephalic  EYES:  conjunctiva and sclera clear  NECK: Supple, No JVD, Normal thyroid  CHEST/LUNG: Clear to auscultation. Clear to percussion bilaterally; No rales, rhonchi, wheezing, or rubs  HEART: Regular rate and rhythm; No murmurs, rubs, or gallops  ABDOMEN: Soft, Nontender, Nondistended; Bowel sounds present, no masses or pain on palpation   NERVOUS SYSTEM:  Alert & Oriented X3  EXTREMITIES:  2+ Peripheral Pulses, No clubbing, cyanosis, or edema. Bilateral hip pain and back pain without deformities   SKIN: warm dry                          10.9   7.91  )-----------( 239      ( 04 Jan 2021 07:07 )             35.1     01-04    140  |  107  |  13  ----------------------------<  126<H>  4.0   |  26  |  0.55    Ca    8.5      04 Jan 2021 07:08  Phos  2.8     01-04  Mg     2.1     01-04        CARDIAC MARKERS ( 03 Jan 2021 07:01 )  x     / x     / 166 U/L / x     / x                           PGY-1 Progress Note discussed with attending    PAGER #: [[phone]] TILL 5:00 PM  PLEASE CONTACT ON CALL TEAM:  - On Call Team (Please refer to Elisa) FROM 5:00 PM - 8:30PM  - Nightfloat Team FROM 8:30 -7:30 AM    CHIEF COMPLAINT & BRIEF HOSPITAL COURSE:  75 year old Female with PMHx of schizophrenia not on any meds, HTN, walks with a walker  presented to the ED after fall on 12/29 and generalized weakness. Patient admits that she was going to get the news paper then she slipped and was not able to stand up by herself, staying on the floor for almost a day until her super found her on floor. Patient reports no LOC, chest pain or shortness of breath. Patient endorses pain to the hip, denies numbness, focal weakness, diarrhea, constipation or abdominal pain, respiratory or urinary symptoms.  Patient admitted for fall and rhabdomyolysis   On admission, CK 2416, started on IVF, trending down, EKG NSR, A1c 10.8 endo on board, vitamin D low, started on oral supplements, abnormal high lipid profile, started on Statin, CTH negative, CT cervical spine no fractures, doppler LE negative for DVT, CXR negative for active pathology.   BP has been around 180s, was on amlodipine, changed by cardio to labetalol and losartan.   Patient with low potassium and phosphorus replaced as needed.  Echo showed Mild concentric left ventricular hypertrophy, GIDD, seen by nutritionist and PT recommends DAVID.    INTERVAL HPI/OVERNIGHT EVENTS: patient refers she is feeling better, denies any discomfort     MEDICATIONS  (STANDING):  acetaminophen   Tablet .. 1000 milliGRAM(s) Oral every 8 hours  atorvastatin 40 milliGRAM(s) Oral at bedtime  dextrose 40% Gel 15 Gram(s) Oral once  dextrose 5%. 1000 milliLiter(s) (50 mL/Hr) IV Continuous <Continuous>  dextrose 5%. 1000 milliLiter(s) (100 mL/Hr) IV Continuous <Continuous>  dextrose 50% Injectable 25 Gram(s) IV Push once  enoxaparin Injectable 40 milliGRAM(s) SubCutaneous daily  ergocalciferol 67953 Unit(s) Oral <User Schedule>  gabapentin 200 milliGRAM(s) Oral every 12 hours  glucagon  Injectable 1 milliGRAM(s) IntraMuscular once  insulin glargine Injectable (LANTUS) 10 Unit(s) SubCutaneous at bedtime  insulin lispro (ADMELOG) corrective regimen sliding scale   SubCutaneous Before meals and at bedtime  insulin lispro Injectable (ADMELOG) 3 Unit(s) SubCutaneous three times a day before meals  labetalol 100 milliGRAM(s) Oral two times a day  losartan 25 milliGRAM(s) Oral daily  pantoprazole    Tablet 40 milliGRAM(s) Oral before breakfast  polyethylene glycol 3350 17 Gram(s) Oral daily    MEDICATIONS  (PRN):  bisacodyl 5 milliGRAM(s) Oral every 12 hours PRN Constipation  traMADol 25 milliGRAM(s) Oral every 8 hours PRN Severe Pain (7 - 10)      Vital Signs Last 24 Hrs  T(C): 36.5 (04 Jan 2021 11:44), Max: 36.8 (04 Jan 2021 04:34)  T(F): 97.7 (04 Jan 2021 11:44), Max: 98.2 (04 Jan 2021 04:34)  HR: 76 (04 Jan 2021 15:08) (57 - 76)  BP: 142/79 (04 Jan 2021 15:08) (113/57 - 171/46)  BP(mean): --  RR: 18 (04 Jan 2021 11:44) (18 - 18)  SpO2: 96% (04 Jan 2021 15:08) (94% - 98%)    PHYSICAL EXAMINATION:  HEAD:  Atraumatic, Normocephalic  EYES:  conjunctiva and sclera clear  NECK: Supple, No JVD, Normal thyroid  CHEST/LUNG: Clear to auscultation. Clear to percussion bilaterally; No rales, rhonchi, wheezing, or rubs  HEART: Regular rate and rhythm; No murmurs, rubs, or gallops  ABDOMEN: Soft, Nontender, Nondistended; Bowel sounds present, no masses or pain on palpation   NERVOUS SYSTEM:  Alert & Oriented X3  EXTREMITIES:  2+ Peripheral Pulses, No clubbing, cyanosis, or edema. Bilateral hip pain and back pain without deformities   SKIN: warm dry                          10.9   7.91  )-----------( 239      ( 04 Jan 2021 07:07 )             35.1     01-04    140  |  107  |  13  ----------------------------<  126<H>  4.0   |  26  |  0.55    Ca    8.5      04 Jan 2021 07:08  Phos  2.8     01-04  Mg     2.1     01-04        CARDIAC MARKERS ( 03 Jan 2021 07:01 )  x     / x     / 166 U/L / x     / x                           Family

## 2021-01-04 NOTE — PROGRESS NOTE ADULT - PROBLEM SELECTOR PLAN 7
- Lipid profile abnormal high  - C/w Statin
- Lipid profile abnormal high  - C/w Statin
- Low potassium and low phosphorus resolved*  - S/p IV replacement  - Mild hyponatremia resolved
- Lipid profile abnormal  - Started on Statin

## 2021-01-04 NOTE — CONSULT NOTE ADULT - NEGATIVE OPHTHALMOLOGIC SYMPTOMS
no photophobia/no lacrimation L/no lacrimation R/no blurred vision L/no blurred vision R/no discharge L/no discharge R/no pain L/no pain R/no irritation L/no irritation R/no loss of vision L/no loss of vision R/no scleral injection L/no scleral injection R

## 2021-01-04 NOTE — CONSULT NOTE ADULT - NEGATIVE ENMT SYMPTOMS
no hearing difficulty/no ear pain/no tinnitus/no vertigo/no sinus symptoms/no nasal discharge/no nasal obstruction/no post-nasal discharge/no nose bleeds/no gum bleeding/no dry mouth/no throat pain/no dysphagia

## 2021-01-04 NOTE — SWALLOW BEDSIDE ASSESSMENT ADULT - SLP PERTINENT HISTORY OF CURRENT PROBLEM
76 y/o  Female who presents with a chief complaint of Rhabdomyolysis; s/p fall and on ground for unknown period of time.  75 year old Female  with  past medical history significant for HTN and schizophrenia presented post fall and admitted with Rhabdomyolysis found to have iron deficiency anemia.  Patient c/o intermittent constipation and diarrhea.

## 2021-01-04 NOTE — ADVANCED PRACTICE NURSE CONSULT - RECOMMEDATIONS
-Clean the L. Gluteal wound with normal saline and apply skin prep to the surrounding skin  -Apply a Foam dressing Q 72hrs PRN  -Elevate/float the patients heels using heel protectors and reposition the patient Q 2hrs using wedges or pillows

## 2021-01-04 NOTE — CONSULT NOTE ADULT - SUBJECTIVE AND OBJECTIVE BOX
[  ] STAT REQUEST              [ X ] ROUTINE REQUEST    Patient is a 75 year old female with iron deficiency anemia. GI consulted to evaluate.         HPI:   75 year old Female  with  past medical history significant for HTN and schizophrenia presented post fall and admitted with Rhabdomyolysis found to have iron deficiency anemia.  Patient c/o intermittent constipation and diarrhea but denies abdominal pain, nausea, vomiting, hematemesis, hematochezia, melena, fever, chills, chest pain, SOB, cough, hematuria, epistaxis, hemoptysis. dysuria.     PAIN MANAGEMENT:  Pain Scale:                 0/10  Pain Location:      Prior Colonoscopy:  No prior colonoscopy    PAST MEDICAL HISTORY  Schizophrenia  HTN      PAST SURGICAL HISTORY  No significant past surgical history        Allergies    Allergy Status Unknown    Intolerances  None         MEDICATIONS  (STANDING):  acetaminophen   Tablet .. 1000 milliGRAM(s) Oral every 8 hours  atorvastatin 40 milliGRAM(s) Oral at bedtime  dextrose 40% Gel 15 Gram(s) Oral once  dextrose 5%. 1000 milliLiter(s) (50 mL/Hr) IV Continuous <Continuous>  dextrose 5%. 1000 milliLiter(s) (100 mL/Hr) IV Continuous <Continuous>  dextrose 50% Injectable 25 Gram(s) IV Push once  enoxaparin Injectable 40 milliGRAM(s) SubCutaneous daily  ergocalciferol 70261 Unit(s) Oral <User Schedule>  gabapentin 200 milliGRAM(s) Oral every 12 hours  glucagon  Injectable 1 milliGRAM(s) IntraMuscular once  insulin glargine Injectable (LANTUS) 10 Unit(s) SubCutaneous at bedtime  insulin lispro (ADMELOG) corrective regimen sliding scale   SubCutaneous Before meals and at bedtime  insulin lispro Injectable (ADMELOG) 3 Unit(s) SubCutaneous three times a day before meals  labetalol 100 milliGRAM(s) Oral two times a day  losartan 25 milliGRAM(s) Oral daily  pantoprazole    Tablet 40 milliGRAM(s) Oral before breakfast  polyethylene glycol 3350 17 Gram(s) Oral daily    MEDICATIONS  (PRN):  bisacodyl 5 milliGRAM(s) Oral every 12 hours PRN Constipation  traMADol 25 milliGRAM(s) Oral every 8 hours PRN Severe Pain (7 - 10)      SOCIAL HISTORY  Advanced Directives:       [ X ] Full Code       [  ] DNR  Marital Status:         [  ] M      [ X ] S      [  ] D       [  ] W  Children:       [ X ] Yes      [  ] No  Occupation:        [  ] Employed       [ X ] Unemployed       [  ] Retired  Diet:       [ X ] Regular       [  ] PEG feeding          [  ] NG tube feeding  Drug Use:           [ X ] Patient denied          [  ] Yes  Alcohol:           [ X ] No             [  ] Yes (socially)         [  ] Yes (chronic)  Tobacco:           [  ] Yes           [ X ] No      FAMILY HISTORY  [X  ] Heart Disease            [ X ] Diabetes             [ X ] HTN             [  ] Colon Cancer             [  ] Stomach Cancer              [  ] Pancreatic Cancer      VITAL SIGNS   Vital Signs Last 24 Hrs  T(C): 36.5 (2021 11:44), Max: 36.8 (2021 15:38)  T(F): 97.7 (2021 11:44), Max: 98.2 (2021 15:38)  HR: 64 (2021 11:44) (57 - 71)  BP: 171/46 (2021 11:44) (113/57 - 171/46)   RR: 18 (2021 11:44) (18 - 20)  SpO2: 98% (2021 11:44) (94% - 98%)   Daily Weight in k.8 (2021 04:34)         CBC Full  -  ( 2021 07:07 )  WBC Count : 7.91 K/uL  RBC Count : 3.83 M/uL  Hemoglobin : 10.9 g/dL  Hematocrit : 35.1 %  Platelet Count - Automated : 239 K/uL  Mean Cell Volume : 91.6 fl  Mean Cell Hemoglobin : 28.5 pg  Mean Cell Hemoglobin Concentration : 31.1 gm/dL  Auto Neutrophil # : x  Auto Lymphocyte # : x  Auto Monocyte # : x  Auto Eosinophil # : x  Auto Basophil # : x  Auto Neutrophil % : x  Auto Lymphocyte % : x  Auto Monocyte % : x  Auto Eosinophil % : x  Auto Basophil % : x      -04    140  |  107  |  13  ----------------------------<  126<H>  4.0   |  26  |  0.55    Ca    8.5      2021 07:08  Phos  2.8     01-04  Mg     2.1     -04    Iron - Total Binding Capacity.: 270 ug/dL   % Saturation, Iron: 9 %   Iron Total, Serum: 25 ug/dL   Unsaturated Iron Binding Capacity: 245 ug/dL     Urinalysis     Glucose Qualitative, Urine: Negative   Blood, Urine: Moderate   pH Urine: 6.0   Color: Yellow   Urine Appearance: Clear   Bilirubin: Negative   Ketone - Urine: Moderate   Specific Gravity: 1.020   Protein, Urine: 15   Urobilinogen: Negative   Nitrite: Negative   Leukocyte Esterase Concentration: Small      12 Lead ECG  Ventricular Rate 60 BPM    Atrial Rate 60 BPM    P-R Interval 88 ms    QRS Duration 84 ms    Q-T Interval 480 ms    QTC Calculation(Bazett) 480 ms    R Axis -37 degrees    T Axis 5 degrees    Diagnosis Line *** Poor data quality, interpretation maybe adversely affected  Sinus rhythm with short HI  Left axis deviation  Anterior infarct , age undetermined  Abnormal ECG        RADIOLOGY/IMAGING      EXAM:  XR CHEST PORTABLE URGENT 1V                            PROCEDURE DATE:  2020          INTERPRETATION:  Portable chest radiograph    CLINICAL INFORMATION: Polytrauma. Fall. Pain    TECHNIQUE:  Portable  AP view of the chest was obtained.    COMPARISON: 2019 chest radiograph available for review.    FINDINGS:  The lungs are clear of airspace consolidations or effusions. No pneumothorax.    The heart and mediastinum are within normal limits.    Visualized osseous structures are intact.        IMPRESSION:   No evidence of active chest disease.

## 2021-01-04 NOTE — PROGRESS NOTE ADULT - ASSESSMENT
75 year old Female  with  PMHx of schizophrenia not on any meds,walks with a walker  presented to the ED after fall on 12/29 and generalized weakness,new onset Dm,uncontrolled HTN and abnormal EKG.  1.D/C Tele monitoring.  2.DM-Insulin,Endo f/u.  3.Fe def anemia-occult,CT abd and pelvis,GI eval.IV Iron.  4.HTN-cont labetalol 100mg bid and cozaar 25mg qd.  5.Lipid d/o-statin.  6.GI and DVT prophylaxis.

## 2021-01-04 NOTE — PROGRESS NOTE ADULT - PROBLEM SELECTOR PROBLEM 8
Goals of care, counseling/discussion
Hyperlipidemia
Goals of care, counseling/discussion
Goals of care, counseling/discussion

## 2021-01-04 NOTE — CONSULT NOTE ADULT - NEGATIVE MUSCULOSKELETAL SYMPTOMS
no muscle cramps/no muscle weakness/no stiffness/no neck pain/no arm pain L/no arm pain R/no back pain/no leg pain L/no leg pain R

## 2021-01-04 NOTE — SWALLOW BEDSIDE ASSESSMENT ADULT - SWALLOW EVAL: DIAGNOSIS
Pt p/w oral dysphagia c/b prolonged mastication 2/2 partial edentition, and slow A-P transport; However, oropharyngeal swallow was intact and timely with no overt s/s of laryngeal penetration or aspiration at this exam.

## 2021-01-04 NOTE — PROGRESS NOTE ADULT - SUBJECTIVE AND OBJECTIVE BOX
Interval Events:  Pt in NAD     Allergies    Allergy Status Unknown    Intolerances      Endocrine/Metabolic Medications:  atorvastatin 40 milliGRAM(s) Oral at bedtime  dextrose 40% Gel 15 Gram(s) Oral once  dextrose 50% Injectable 25 Gram(s) IV Push once  glucagon  Injectable 1 milliGRAM(s) IntraMuscular once  insulin glargine Injectable (LANTUS) 10 Unit(s) SubCutaneous at bedtime  insulin lispro (ADMELOG) corrective regimen sliding scale   SubCutaneous Before meals and at bedtime  insulin lispro Injectable (ADMELOG) 3 Unit(s) SubCutaneous three times a day before meals      Vital Signs Last 24 Hrs  T(C): 36.5 (04 Jan 2021 11:44), Max: 36.8 (03 Jan 2021 15:38)  T(F): 97.7 (04 Jan 2021 11:44), Max: 98.2 (03 Jan 2021 15:38)  HR: 64 (04 Jan 2021 11:44) (57 - 71)  BP: 171/46 (04 Jan 2021 11:44) (113/57 - 171/46)  BP(mean): --  RR: 18 (04 Jan 2021 11:44) (18 - 20)  SpO2: 98% (04 Jan 2021 11:44) (94% - 98%)      PHYSICAL EXAM  All physical exam findings normal, except those marked:  General:	Alert, active, cooperative, NAD, well hydrated  .		[] Abnormal:  Neck		Normal: supple, no cervical adenopathy, no palpable thyroid  .		[] Abnormal:  Cardiovascular	Normal: regular rate, normal S1, S2, no murmurs  .		[] Abnormal:  Respiratory	Normal: no chest wall deformity, normal respiratory pattern, CTA B/L  .		[] Abnormal:  Abdominal	Normal: soft, ND, NT, bowel sounds present, no masses, no organomegaly  .		[] Abnormal:  		Normal normal genitalia, testes descended, circumcised/uncircumcised  .		Srinivas stage:			Breast srinivas:  .		Menstrual history:  .		[] Abnormal:  Extremities	Normal: FROM x4  .		[] Abnormal:  Skin		Normal: intact and not indurated, no rash, no acanthosis nigricans  .		[] Abnormal:  Neurologic	Normal: grossly intact  .		[] Abnormal:    LABS                        10.9   7.91  )-----------( 239      ( 04 Jan 2021 07:07 )             35.1                               140    |  107    |  13                  Calcium: 8.5   / iCa: x      (01-04 @ 07:08)    ----------------------------<  126       Magnesium: 2.1                              4.0     |  26     |  0.55             Phosphorous: 2.8        CAPILLARY BLOOD GLUCOSE      POCT Blood Glucose.: 246 mg/dL (04 Jan 2021 11:59)  POCT Blood Glucose.: 128 mg/dL (04 Jan 2021 07:40)  POCT Blood Glucose.: 175 mg/dL (03 Jan 2021 20:50)  POCT Blood Glucose.: 205 mg/dL (03 Jan 2021 17:15)

## 2021-01-04 NOTE — PROGRESS NOTE ADULT - PROBLEM SELECTOR PLAN 9
discussed with the patient and she needs more time to think about it.
RISK                                                          Points  [] Previous VTE                                           3  [] Thrombophilia                                        2  [] Lower limb paralysis                              2   [] Current Cancer                                       2   [x] Immobilization > 24 hrs                        1  [] ICU/CCU stay > 24 hours                       1  [x] Age > 60                                                   1  score 2   C/w Levonox

## 2021-01-04 NOTE — SWALLOW BEDSIDE ASSESSMENT ADULT - COMMENTS
Pt spit out initial spoonful, stating she could not chew. Refused further. Pt AA+Ox3; HOB elevated to 90°. Pt had dental implant screws along lower gums. Stated preference for puree solids at this time.

## 2021-01-04 NOTE — ADVANCED PRACTICE NURSE CONSULT - ASSESSMENT
This is a 75yr old female patient admitted for Rhabdomyolysis presenting with a L. Gluteal Stage 2 Pressure Injury (3cm x 5cm x 0.2cm0 with pink tissue and scant drainage

## 2021-01-04 NOTE — PROGRESS NOTE ADULT - SUBJECTIVE AND OBJECTIVE BOX
Source of information: , Chart review  Patient language: English  : n/a    CC:  left hip pain    This is a 75yFemale patient who presents to the hospital for Patient is a 75y old  Female who presents with a chief complaint of Rhabdomyolysis (04 Jan 2021 14:36)     Pt s/p fall and on ground for unknown period of time.  Pt admitted with weakness and rhabo which is resolving.  + left hip pain which is chronic. Patient states that pain has been there since childhood.  Pain is described as tightness.  No chest pain or sob.  Pt state that she feels better and was able to stand for a period of time yesterday.  Pt will work with PT tomorrow to ambulate.     PAIN SCORE:     5/10    SCALE USED: (1-10 NRS)      PAST MEDICAL & SURGICAL HISTORY:  Schizophrenia    No significant past surgical history        FAMILY HISTORY:  No pertinent family history in first degree relatives      SOCIAL HISTORY:  [x ] Denies Smoking, Alcohol, or Drug Use    Allergies    Allergy Status Unknown    Intolerances        MEDICATIONS:    MEDICATIONS  (STANDING):  acetaminophen   Tablet .. 1000 milliGRAM(s) Oral every 8 hours  atorvastatin 40 milliGRAM(s) Oral at bedtime  dextrose 40% Gel 15 Gram(s) Oral once  dextrose 5%. 1000 milliLiter(s) (50 mL/Hr) IV Continuous <Continuous>  dextrose 5%. 1000 milliLiter(s) (100 mL/Hr) IV Continuous <Continuous>  dextrose 50% Injectable 25 Gram(s) IV Push once  enoxaparin Injectable 40 milliGRAM(s) SubCutaneous daily  ergocalciferol 58618 Unit(s) Oral <User Schedule>  gabapentin 200 milliGRAM(s) Oral every 12 hours  glucagon  Injectable 1 milliGRAM(s) IntraMuscular once  insulin glargine Injectable (LANTUS) 10 Unit(s) SubCutaneous at bedtime  insulin lispro (ADMELOG) corrective regimen sliding scale   SubCutaneous Before meals and at bedtime  insulin lispro Injectable (ADMELOG) 3 Unit(s) SubCutaneous three times a day before meals  labetalol 100 milliGRAM(s) Oral two times a day  losartan 25 milliGRAM(s) Oral daily  pantoprazole    Tablet 40 milliGRAM(s) Oral before breakfast  polyethylene glycol 3350 17 Gram(s) Oral daily    MEDICATIONS  (PRN):  bisacodyl 5 milliGRAM(s) Oral every 12 hours PRN Constipation  traMADol 25 milliGRAM(s) Oral every 8 hours PRN Severe Pain (7 - 10)      Vital Signs Last 24 Hrs  T(C): 36.5 (04 Jan 2021 11:44), Max: 36.8 (04 Jan 2021 04:34)  T(F): 97.7 (04 Jan 2021 11:44), Max: 98.2 (04 Jan 2021 04:34)  HR: 76 (04 Jan 2021 15:08) (57 - 76)  BP: 142/79 (04 Jan 2021 15:08) (113/57 - 171/46)  BP(mean): --  RR: 18 (04 Jan 2021 11:44) (18 - 18)  SpO2: 96% (04 Jan 2021 15:08) (94% - 98%)    LABS:                          10.9   7.91  )-----------( 239      ( 04 Jan 2021 07:07 )             35.1     01-04    140  |  107  |  13  ----------------------------<  126<H>  4.0   |  26  |  0.55    Ca    8.5      04 Jan 2021 07:08  Phos  2.8     01-04  Mg     2.1     01-04            CAPILLARY BLOOD GLUCOSE      POCT Blood Glucose.: 246 mg/dL (04 Jan 2021 11:59)  POCT Blood Glucose.: 128 mg/dL (04 Jan 2021 07:40)  POCT Blood Glucose.: 175 mg/dL (03 Jan 2021 20:50)  POCT Blood Glucose.: 205 mg/dL (03 Jan 2021 17:15)      Radiology:      Drug Screen:        REVIEW OF SYSTEMS:  CONSTITUTIONAL: No fever or fatigue  RESPIRATORY: No cough, wheezing, chills or hemoptysis; No shortness of breath  CARDIOVASCULAR: No chest pain, palpitations, dizziness, or leg swelling  GASTROINTESTINAL: No abdominal or epigastric pain. No nausea, vomiting; No diarrhea or constipation.   GENITOURINARY: No dysuria, frequency, hematuria, retention or incontinence  MUSCULOSKELETAL: + left hip pain  NEURO: No headaches, No numbness/tingling b/l LE, + generalized weakness which is better  PSYCHIATRIC: No depression, anxiety, mood swings, or difficulty sleeping    PHYSICAL EXAM:  GENERAL:  Alert & Oriented X3, NAD, Good concentration  CHEST/LUNG: Clear to auscultation bilaterally; No rales, rhonchi, wheezing, or rubs  HEART: Regular rate and rhythm; No murmurs, rubs, or gallops  ABDOMEN: Soft, Nontender, Nondistended; Bowel sounds present  EXTREMITIES:  2+ Peripheral Pulses, No cyanosis, or edema  MUSCULOSKELETAL: + decreased rom + left hip tightness   SKIN: No rashes or lesions    Risk factors associated with adverse outcomes related to opioid treatment  [ ]  Concurrent benzodiazepine use  [ ]  History/ Active substance use or alcohol use disorder  [ ] Psychiatric co-morbidity  [ ] Sleep apnea  [ ] COPD  [ ] BMI> 35  [ ] Liver dysfunction  [ ] Renal dysfunction  [ ] CHF  [ ] Smoker  [x ]  Age > 60 years    [x ]  [place]  Reviewed and Copied to Chart. See below.    Plan of care and goal oriented pain management treatment options were discussed with patient and /or primary care giver; all questions and concerns were addressed and care was aligned with patient's wishes.    Educated patient on goal oriented pain management treatment options     01-04-21 @ 15:53

## 2021-01-04 NOTE — PROGRESS NOTE ADULT - PROBLEM SELECTOR PLAN 1
- On admission Hb 15, now 10 was most likely 2/2 hemoconcentration vs underlying anemia   - F/u CT abdomen pelvis  - iron panel iron deficiency - On admission Hb 15, now 10 was most likely 2/2 hemoconcentration vs underlying anemia   - FOBT -ve  - GI Dr Skinner  - F/u CT abdomen pelvis  - iron panel iron deficiency

## 2021-01-04 NOTE — PROGRESS NOTE ADULT - PROBLEM SELECTOR PLAN 10
RISK                                                          Points  [] Previous VTE                                           3  [] Thrombophilia                                        2  [] Lower limb paralysis                              2   [] Current Cancer                                       2   [x] Immobilization > 24 hrs                        1  [] ICU/CCU stay > 24 hours                       1  [x] Age > 60                                                   1  score 2   C/w Levonox

## 2021-01-04 NOTE — PROGRESS NOTE ADULT - PROBLEM SELECTOR PLAN 2
- patient presented with a fall and was left on the floor for a day   - On admission CK 2416, trending down, now 164  - EKG NSR  - S/p IVF 1 L NS bolus

## 2021-01-04 NOTE — PROGRESS NOTE ADULT - ASSESSMENT
75 year old Female  with  PMHx of scizophrenia not on any meds,walks with a walker  presented to the ED after fall yesterday 12/29 and generalized weakness.    Endo is consulted for new diagnosis of DM.     1. New diagnosis of DM   A1c: 10.8  now better controlled   Continue Lantus 10 units hs   Ademlog  3units  TID pre meals with SS coverage.   Diabetic education   nutrition eval  d/w hs      75 year old Female  with  PMHx of scizophrenia not on any meds,walks with a walker  presented to the ED after fall yesterday 12/29 and generalized weakness.    Endo is consulted for new diagnosis of DM.     1. New diagnosis of DM   A1c: 10.8  now better controlled   Continue Lantus 10 units hs   Ademlog  3units  TID pre meals with SS coverage.   Diabetic education   nutrition eval  d/w hs     Anemia- w/u per prim team  Gi destini noted

## 2021-01-04 NOTE — PROGRESS NOTE ADULT - SUBJECTIVE AND OBJECTIVE BOX
CHIEF COMPLAINT:Patient is a 75y old  Female who presents with a chief complaint of Rhabdomyolysis .pt appears comfortable.    	  REVIEW OF SYSTEMS:  CONSTITUTIONAL: No fever, weight loss, or fatigue  EYES: No eye pain, visual disturbances, or discharge  ENT:  No difficulty hearing, tinnitus, vertigo; No sinus or throat pain  NECK: No pain or stiffness  RESPIRATORY: No cough, wheezing, chills or hemoptysis; No Shortness of Breath  CARDIOVASCULAR: No chest pain, palpitations, passing out, dizziness, or leg swelling  GASTROINTESTINAL: No abdominal or epigastric pain. No nausea, vomiting, or hematemesis; No diarrhea or constipation. No melena or hematochezia.  GENITOURINARY: No dysuria, frequency, hematuria, or incontinence  NEUROLOGICAL: No headaches, memory loss, loss of strength, numbness, or tremors  SKIN: No itching, burning, rashes, or lesions   LYMPH Nodes: No enlarged glands  ENDOCRINE: No heat or cold intolerance; No hair loss  MUSCULOSKELETAL: No joint pain or swelling; No muscle, back, or extremity pain  PSYCHIATRIC: No depression, anxiety, mood swings, or difficulty sleeping  HEME/LYMPH: No easy bruising, or bleeding gums  ALLERGY AND IMMUNOLOGIC: No hives or eczema	      PHYSICAL EXAM:  T(C): 36.6 (01-04-21 @ 07:39), Max: 36.8 (01-03-21 @ 15:38)  HR: 61 (01-04-21 @ 07:39) (52 - 71)  BP: 154/50 (01-04-21 @ 07:39) (113/57 - 158/64)  RR: 18 (01-04-21 @ 07:39) (18 - 20)  SpO2: 98% (01-04-21 @ 07:39) (94% - 98%)  Wt(kg): --  I&O's Summary      Appearance: Normal	  HEENT:   Normal oral mucosa, PERRL, EOMI	  Lymphatic: No lymphadenopathy  Cardiovascular: Normal S1 S2, No JVD, No murmurs, No edema  Respiratory: Lungs clear to auscultation	  Psychiatry: A & O x 3, Mood & affect appropriate  Gastrointestinal:  Soft, Non-tender, + BS	  Skin: No rashes, No ecchymoses, No cyanosis	  Neurologic: Non-focal  Extremities: Normal range of motion, No clubbing, cyanosis or edema  Vascular: Peripheral pulses palpable 2+ bilaterally    MEDICATIONS  (STANDING):  acetaminophen   Tablet .. 1000 milliGRAM(s) Oral every 8 hours  atorvastatin 40 milliGRAM(s) Oral at bedtime  dextrose 40% Gel 15 Gram(s) Oral once  dextrose 5%. 1000 milliLiter(s) (50 mL/Hr) IV Continuous <Continuous>  dextrose 5%. 1000 milliLiter(s) (100 mL/Hr) IV Continuous <Continuous>  dextrose 50% Injectable 25 Gram(s) IV Push once  enoxaparin Injectable 40 milliGRAM(s) SubCutaneous daily  ergocalciferol 98682 Unit(s) Oral <User Schedule>  gabapentin 200 milliGRAM(s) Oral every 12 hours  glucagon  Injectable 1 milliGRAM(s) IntraMuscular once  insulin glargine Injectable (LANTUS) 10 Unit(s) SubCutaneous at bedtime  insulin lispro (ADMELOG) corrective regimen sliding scale   SubCutaneous Before meals and at bedtime  insulin lispro Injectable (ADMELOG) 3 Unit(s) SubCutaneous three times a day before meals  iron sucrose IVPB 200 milliGRAM(s) IV Intermittent once  labetalol 100 milliGRAM(s) Oral two times a day  losartan 25 milliGRAM(s) Oral daily  pantoprazole    Tablet 40 milliGRAM(s) Oral before breakfast  polyethylene glycol 3350 17 Gram(s) Oral daily      	  	  LABS:	 	    CARDIAC MARKERS:  CARDIAC MARKERS ( 03 Jan 2021 07:01 )  x     / x     / 166 U/L / x     / x                                    10.9   7.91  )-----------( 239      ( 04 Jan 2021 07:07 )             35.1     01-04    140  |  107  |  13  ----------------------------<  126<H>  4.0   |  26  |  0.55    Ca    8.5      04 Jan 2021 07:08  Phos  2.8     01-04  Mg     2.1     01-04      proBNP: Serum Pro-Brain Natriuretic Peptide: 3429 pg/mL (12-30 @ 19:30)    Lipid Profile: Cholesterol 256  LDL --  HDL 41    Cholesterol 255  LDL --  HDL 47      TSH: Thyroid Stimulating Hormone, Serum: 1.44 uU/mL (12-31 @ 06:47)  Thyroid Stimulating Hormone, Serum: 1.20 uU/mL (12-30 @ 19:30)

## 2021-01-04 NOTE — PROGRESS NOTE ADULT - SUBJECTIVE AND OBJECTIVE BOX
Patient is a 75y old  Female who presents with a chief complaint of Rhabdomyolysis (03 Jan 2021 10:54)    pt seen in tele [ x ], reg med floor [   ], bed [x  ], chair at bedside [   ], a+o x3 [ x ], lethargic [  ],    nad [x  ]      Allergies    Allergy Status Unknown        Vitals    T(F): 98.2 (01-04-21 @ 04:34), Max: 98.2 (01-03-21 @ 15:38)  HR: 57 (01-04-21 @ 04:34) (52 - 71)  BP: 113/57 (01-04-21 @ 04:34) (113/57 - 158/64)  RR: 18 (01-04-21 @ 04:34) (18 - 20)  SpO2: 96% (01-04-21 @ 04:34) (94% - 97%)  Wt(kg): --  CAPILLARY BLOOD GLUCOSE      POCT Blood Glucose.: 175 mg/dL (03 Jan 2021 20:50)      Labs                          10.8   7.55  )-----------( 203      ( 03 Jan 2021 07:01 )             33.9       01-03    137  |  107  |  14  ----------------------------<  154<H>  3.8   |  22  |  0.56    Ca    7.8<L>      03 Jan 2021 07:01  Phos  1.9     01-03  Mg     2.2     01-03        CARDIAC MARKERS ( 03 Jan 2021 07:01 )  x     / x     / 166 U/L / x     / x      CARDIAC MARKERS ( 02 Jan 2021 07:07 )  x     / x     / 319 U/L / x     / x            .Blood Blood-Peripheral  12-31 @ 06:37   No growth to date.  --  --          Radiology Results      Meds    MEDICATIONS  (STANDING):  acetaminophen   Tablet .. 1000 milliGRAM(s) Oral every 8 hours  atorvastatin 40 milliGRAM(s) Oral at bedtime  dextrose 40% Gel 15 Gram(s) Oral once  dextrose 5%. 1000 milliLiter(s) (50 mL/Hr) IV Continuous <Continuous>  dextrose 5%. 1000 milliLiter(s) (100 mL/Hr) IV Continuous <Continuous>  dextrose 50% Injectable 25 Gram(s) IV Push once  enoxaparin Injectable 40 milliGRAM(s) SubCutaneous daily  ergocalciferol 53580 Unit(s) Oral <User Schedule>  gabapentin 200 milliGRAM(s) Oral every 12 hours  glucagon  Injectable 1 milliGRAM(s) IntraMuscular once  insulin glargine Injectable (LANTUS) 10 Unit(s) SubCutaneous at bedtime  insulin lispro (ADMELOG) corrective regimen sliding scale   SubCutaneous Before meals and at bedtime  insulin lispro Injectable (ADMELOG) 3 Unit(s) SubCutaneous three times a day before meals  labetalol 100 milliGRAM(s) Oral two times a day  losartan 25 milliGRAM(s) Oral daily  pantoprazole    Tablet 40 milliGRAM(s) Oral before breakfast  polyethylene glycol 3350 17 Gram(s) Oral daily      MEDICATIONS  (PRN):  bisacodyl 5 milliGRAM(s) Oral every 12 hours PRN Constipation  traMADol 25 milliGRAM(s) Oral every 8 hours PRN Severe Pain (7 - 10)      Physical Exam    Neuro :  no focal deficits  Respiratory: CTA B/L  CV: RRR, S1S2, no murmurs,   Abdominal: Soft, NT, ND +BS,  Extremities: No edema, + peripheral pulses    ASSESSMENT      s/p fall  Rhabdomyolysis   uncontrolled htn  r/o acs  hyperglycemia   r/o dm   myalgia   hypokalemia  h/o Schizophrenia        PLAN      cont tele,   acs protocol,   trop x 3 noted above  cont aspirin, statin,   cardio f/u   cont labetalol and losartan  echo Ejection Fraction Visual Estimate: >55 %, Mild diastolic dysfunction (stage I) noted.  ck much improved  cont  ivf,   hgba1c 10.6 noted    cont hss  endo f/u   Lantus 10 units hs   Ademlog  3units  TID pre meals with SS coverage.   Diabetic education   nutrition eval  phys tx eval noted and rec brunilda  pain mgmt eval  cont flexeril   supplement potassium as needed  cont current meds         Patient is a 75y old  Female who presents with a chief complaint of Rhabdomyolysis (03 Jan 2021 10:54)    pt seen in tele [ x ], reg med floor [   ], bed [x  ], chair at bedside [   ], a+o x3 [ x ], lethargic [  ],    nad [x  ]      Allergies    Allergy Status Unknown        Vitals    T(F): 98.2 (01-04-21 @ 04:34), Max: 98.2 (01-03-21 @ 15:38)  HR: 57 (01-04-21 @ 04:34) (52 - 71)  BP: 113/57 (01-04-21 @ 04:34) (113/57 - 158/64)  RR: 18 (01-04-21 @ 04:34) (18 - 20)  SpO2: 96% (01-04-21 @ 04:34) (94% - 97%)  Wt(kg): --  CAPILLARY BLOOD GLUCOSE      POCT Blood Glucose.: 175 mg/dL (03 Jan 2021 20:50)      Labs                          10.8   7.55  )-----------( 203      ( 03 Jan 2021 07:01 )             33.9       01-03    137  |  107  |  14  ----------------------------<  154<H>  3.8   |  22  |  0.56    Ca    7.8<L>      03 Jan 2021 07:01  Phos  1.9     01-03  Mg     2.2     01-03        CARDIAC MARKERS ( 03 Jan 2021 07:01 )  x     / x     / 166 U/L / x     / x      CARDIAC MARKERS ( 02 Jan 2021 07:07 )  x     / x     / 319 U/L / x     / x            .Blood Blood-Peripheral  12-31 @ 06:37   No growth to date.  --  --          Radiology Results      Meds    MEDICATIONS  (STANDING):  acetaminophen   Tablet .. 1000 milliGRAM(s) Oral every 8 hours  atorvastatin 40 milliGRAM(s) Oral at bedtime  dextrose 40% Gel 15 Gram(s) Oral once  dextrose 5%. 1000 milliLiter(s) (50 mL/Hr) IV Continuous <Continuous>  dextrose 5%. 1000 milliLiter(s) (100 mL/Hr) IV Continuous <Continuous>  dextrose 50% Injectable 25 Gram(s) IV Push once  enoxaparin Injectable 40 milliGRAM(s) SubCutaneous daily  ergocalciferol 16630 Unit(s) Oral <User Schedule>  gabapentin 200 milliGRAM(s) Oral every 12 hours  glucagon  Injectable 1 milliGRAM(s) IntraMuscular once  insulin glargine Injectable (LANTUS) 10 Unit(s) SubCutaneous at bedtime  insulin lispro (ADMELOG) corrective regimen sliding scale   SubCutaneous Before meals and at bedtime  insulin lispro Injectable (ADMELOG) 3 Unit(s) SubCutaneous three times a day before meals  labetalol 100 milliGRAM(s) Oral two times a day  losartan 25 milliGRAM(s) Oral daily  pantoprazole    Tablet 40 milliGRAM(s) Oral before breakfast  polyethylene glycol 3350 17 Gram(s) Oral daily      MEDICATIONS  (PRN):  bisacodyl 5 milliGRAM(s) Oral every 12 hours PRN Constipation  traMADol 25 milliGRAM(s) Oral every 8 hours PRN Severe Pain (7 - 10)      Physical Exam    Neuro :  no focal deficits  Respiratory: CTA B/L  CV: RRR, S1S2, no murmurs,   Abdominal: Soft, NT, ND +BS,  Extremities: No edema, + peripheral pulses    ASSESSMENT      s/p fall  Rhabdomyolysis   uncontrolled htn  r/o acs  hyperglycemia   r/o dm   myalgia   hypokalemia   iron def anemia  h/o Schizophrenia        PLAN      cont tele,   acs protocol,   trop x 3 noted above  cont aspirin, statin,   cardio f/u   cont labetalol and losartan  echo Ejection Fraction Visual Estimate: >55 %, Mild diastolic dysfunction (stage I) noted.  ck wnl  cont  ivf,   hgba1c 10.6 noted    cont hss  endo f/u   Lantus 10 units hs   Ademlog  3units  TID pre meals with SS coverage.   Diabetic education   nutrition eval  phys tx eval noted and rec brunilda  pain mgmt eval  cont gabapentin 200mg q12  f/u stool occult blood  gi cons  f/u cea  no signs of active bleeding  cont current meds

## 2021-01-04 NOTE — PROGRESS NOTE ADULT - PROBLEM SELECTOR PLAN 3
- Patient presented with a fall    - US doppler LE negative DVT   - Bilateral hip x ray showed no bone pathology   - CT head no acute event  - CT cervical spine no fractures   - C/w Tylenol, tramadol and flexeril for pain management   - Pain management on board   - Echo showed Mild concentric left ventricular hypertrophy, GIDD  - PT recommends DAVID - Patient presented with a fall    - US doppler LE negative DVT   - Bilateral hip x ray showed no bone pathology   - CT head no acute event  - CT cervical spine no fractures   - C/w Tylenol, tramadol prn and gabapentin for pain management   - Pain management on board   - Echo showed Mild concentric left ventricular hypertrophy, GIDD  - PT recommends DAVID

## 2021-01-04 NOTE — CONSULT NOTE ADULT - NEGATIVE RESPIRATORY AND THORAX SYMPTOMS
Subjective: Patient seen and examined. No new events except as noted.     REVIEW OF SYSTEMS:    Unable to obtain       MEDICATIONS:  MEDICATIONS  (STANDING):  albuterol/ipratropium for Nebulization. 3 milliLiter(s) Nebulizer every 6 hours  amantadine Syrup 100 milliGRAM(s) Oral <User Schedule>  artificial  tears Solution 1 Drop(s) Both EYES every 6 hours  aspirin  chewable 81 milliGRAM(s) Enteral Tube daily  bisacodyl Suppository 10 milliGRAM(s) Rectal at bedtime  chlorhexidine 4% Liquid 1 Application(s) Topical <User Schedule>  dextrose 5%. 1000 milliLiter(s) (50 mL/Hr) IV Continuous <Continuous>  dextrose 50% Injectable 12.5 Gram(s) IV Push once  dextrose 50% Injectable 25 Gram(s) IV Push once  dextrose 50% Injectable 25 Gram(s) IV Push once  famotidine    Tablet 20 milliGRAM(s) Oral daily  fentaNYL   Patch  75 MICROgram(s)/Hr 1 Patch Transdermal every 72 hours  heparin   Injectable 5000 Unit(s) SubCutaneous every 8 hours  hydrALAZINE 100 milliGRAM(s) Oral three times a day  isosorbide   dinitrate Tablet (ISORDIL) 20 milliGRAM(s) Enteral Tube <User Schedule>  lactobacillus acidophilus 1 Tablet(s) Oral two times a day  lidocaine   Patch 1 Patch Transdermal daily  meropenem  IVPB 1000 milliGRAM(s) IV Intermittent every 12 hours  meropenem  IVPB      metoprolol tartrate 50 milliGRAM(s) Oral three times a day  polyethylene glycol 3350 17 Gram(s) Oral daily  QUEtiapine 50 milliGRAM(s) Oral <User Schedule>  sodium chloride 7% Inhalation 4 milliLiter(s) Inhalation every 12 hours      PHYSICAL EXAM:  T(C): 37.2 (09-09-20 @ 06:10), Max: 39.2 (09-08-20 @ 15:00)  HR: 88 (09-09-20 @ 08:35) (83 - 120)  BP: 158/88 (09-09-20 @ 04:15) (109/72 - 169/81)  RR: 20 (09-09-20 @ 08:35) (18 - 20)  SpO2: 100% (09-09-20 @ 08:35) (96% - 100%)  Wt(kg): --  I&O's Summary    08 Sep 2020 07:01  -  09 Sep 2020 07:00  --------------------------------------------------------  IN: 4060 mL / OUT: 4800 mL / NET: -740 mL          Appearance: sleepy , +trach   HEENT:   Dry  oral mucosa,  Lymphatic: No lymphadenopathy  Cardiovascular: Normal S1 S2, No JVD, No murmurs, No edema  Respiratory: Ventilated   Psychiatry: A & O x 0  Gastrointestinal:  Soft, Non-tender, +PEG   Skin: No rashes, No ecchymoses, No cyanosis	  Mental status- No acute distress, EO to stim  -CN- Pupils R 4mm NR, L 2mm sluggish, EOMI, tongue midline, face symmetric  +cough/gag  C briskly, two fingers/thumbs up on RUE, distally AG, wiggles toes on RLE      LABS:    CARDIAC MARKERS:                                9.0    31.19 )-----------( 238      ( 09 Sep 2020 06:51 )             29.9     09-09    140  |  104  |  43<H>  ----------------------------<  141<H>  4.5   |  22  |  2.75<H>    Ca    9.6      09 Sep 2020 06:51  Phos  4.5     09-09  Mg     2.3     09-09    TPro  7.0  /  Alb  3.1<L>  /  TBili  0.2  /  DBili  x   /  AST  28  /  ALT  26  /  AlkPhos  87  09-09    proBNP:   Lipid Profile:   HgA1c:   TSH:     1          TELEMETRY: 	    ECG:  	  RADIOLOGY:   DIAGNOSTIC TESTING:  [ ] Echocardiogram:  [ ]  Catheterization:  [ ] Stress Test:    OTHER: no wheezing/no dyspnea/no cough/no hemoptysis

## 2021-01-05 LAB
ANION GAP SERPL CALC-SCNC: 5 MMOL/L — SIGNIFICANT CHANGE UP (ref 5–17)
BUN SERPL-MCNC: 14 MG/DL — SIGNIFICANT CHANGE UP (ref 7–18)
CALCIUM SERPL-MCNC: 8.4 MG/DL — SIGNIFICANT CHANGE UP (ref 8.4–10.5)
CHLORIDE SERPL-SCNC: 104 MMOL/L — SIGNIFICANT CHANGE UP (ref 96–108)
CO2 SERPL-SCNC: 29 MMOL/L — SIGNIFICANT CHANGE UP (ref 22–31)
CREAT SERPL-MCNC: 0.58 MG/DL — SIGNIFICANT CHANGE UP (ref 0.5–1.3)
CULTURE RESULTS: SIGNIFICANT CHANGE UP
CULTURE RESULTS: SIGNIFICANT CHANGE UP
GLUCOSE BLDC GLUCOMTR-MCNC: 142 MG/DL — HIGH (ref 70–99)
GLUCOSE BLDC GLUCOMTR-MCNC: 169 MG/DL — HIGH (ref 70–99)
GLUCOSE BLDC GLUCOMTR-MCNC: 215 MG/DL — HIGH (ref 70–99)
GLUCOSE BLDC GLUCOMTR-MCNC: 217 MG/DL — HIGH (ref 70–99)
GLUCOSE SERPL-MCNC: 165 MG/DL — HIGH (ref 70–99)
HCT VFR BLD CALC: 33 % — LOW (ref 34.5–45)
HGB BLD-MCNC: 10.4 G/DL — LOW (ref 11.5–15.5)
MAGNESIUM SERPL-MCNC: 1.9 MG/DL — SIGNIFICANT CHANGE UP (ref 1.6–2.6)
MCHC RBC-ENTMCNC: 29 PG — SIGNIFICANT CHANGE UP (ref 27–34)
MCHC RBC-ENTMCNC: 31.5 GM/DL — LOW (ref 32–36)
MCV RBC AUTO: 91.9 FL — SIGNIFICANT CHANGE UP (ref 80–100)
NRBC # BLD: 0 /100 WBCS — SIGNIFICANT CHANGE UP (ref 0–0)
PHOSPHATE SERPL-MCNC: 2.8 MG/DL — SIGNIFICANT CHANGE UP (ref 2.5–4.5)
PLATELET # BLD AUTO: 298 K/UL — SIGNIFICANT CHANGE UP (ref 150–400)
POTASSIUM SERPL-MCNC: 3.8 MMOL/L — SIGNIFICANT CHANGE UP (ref 3.5–5.3)
POTASSIUM SERPL-SCNC: 3.8 MMOL/L — SIGNIFICANT CHANGE UP (ref 3.5–5.3)
RBC # BLD: 3.59 M/UL — LOW (ref 3.8–5.2)
RBC # FLD: 13.2 % — SIGNIFICANT CHANGE UP (ref 10.3–14.5)
SODIUM SERPL-SCNC: 138 MMOL/L — SIGNIFICANT CHANGE UP (ref 135–145)
SPECIMEN SOURCE: SIGNIFICANT CHANGE UP
SPECIMEN SOURCE: SIGNIFICANT CHANGE UP
WBC # BLD: 8.19 K/UL — SIGNIFICANT CHANGE UP (ref 3.8–10.5)
WBC # FLD AUTO: 8.19 K/UL — SIGNIFICANT CHANGE UP (ref 3.8–10.5)

## 2021-01-05 PROCEDURE — 99231 SBSQ HOSP IP/OBS SF/LOW 25: CPT

## 2021-01-05 PROCEDURE — 74176 CT ABD & PELVIS W/O CONTRAST: CPT | Mod: 26

## 2021-01-05 RX ORDER — IOHEXOL 300 MG/ML
30 INJECTION, SOLUTION INTRAVENOUS ONCE
Refills: 0 | Status: DISCONTINUED | OUTPATIENT
Start: 2021-01-05 | End: 2021-01-06

## 2021-01-05 RX ORDER — IOHEXOL 300 MG/ML
30 INJECTION, SOLUTION INTRAVENOUS ONCE
Refills: 0 | Status: COMPLETED | OUTPATIENT
Start: 2021-01-05 | End: 2021-01-05

## 2021-01-05 RX ADMIN — Medication 1000 MILLIGRAM(S): at 05:19

## 2021-01-05 RX ADMIN — Medication 1000 MILLIGRAM(S): at 05:49

## 2021-01-05 RX ADMIN — Medication 100 MILLIGRAM(S): at 05:19

## 2021-01-05 RX ADMIN — GABAPENTIN 200 MILLIGRAM(S): 400 CAPSULE ORAL at 10:04

## 2021-01-05 RX ADMIN — INSULIN GLARGINE 10 UNIT(S): 100 INJECTION, SOLUTION SUBCUTANEOUS at 21:53

## 2021-01-05 RX ADMIN — Medication 1000 MILLIGRAM(S): at 16:00

## 2021-01-05 RX ADMIN — IOHEXOL 30 MILLILITER(S): 300 INJECTION, SOLUTION INTRAVENOUS at 10:05

## 2021-01-05 RX ADMIN — Medication 3 UNIT(S): at 12:17

## 2021-01-05 RX ADMIN — LOSARTAN POTASSIUM 25 MILLIGRAM(S): 100 TABLET, FILM COATED ORAL at 05:19

## 2021-01-05 RX ADMIN — GABAPENTIN 200 MILLIGRAM(S): 400 CAPSULE ORAL at 22:04

## 2021-01-05 RX ADMIN — ENOXAPARIN SODIUM 40 MILLIGRAM(S): 100 INJECTION SUBCUTANEOUS at 11:36

## 2021-01-05 RX ADMIN — Medication 1: at 17:16

## 2021-01-05 RX ADMIN — Medication 3 UNIT(S): at 08:42

## 2021-01-05 RX ADMIN — Medication 2: at 12:17

## 2021-01-05 RX ADMIN — Medication 100 MILLIGRAM(S): at 17:33

## 2021-01-05 RX ADMIN — ATORVASTATIN CALCIUM 40 MILLIGRAM(S): 80 TABLET, FILM COATED ORAL at 23:42

## 2021-01-05 RX ADMIN — Medication 2: at 21:54

## 2021-01-05 RX ADMIN — Medication 1000 MILLIGRAM(S): at 15:16

## 2021-01-05 RX ADMIN — PANTOPRAZOLE SODIUM 40 MILLIGRAM(S): 20 TABLET, DELAYED RELEASE ORAL at 06:18

## 2021-01-05 RX ADMIN — Medication 3 UNIT(S): at 17:17

## 2021-01-05 NOTE — PROGRESS NOTE ADULT - SUBJECTIVE AND OBJECTIVE BOX
[   ] ICU                                          [   ] CCU                                      [ X  ] Medical Floor      75 year old Female  with  past medical history significant for HTN and schizophrenia presented post fall and admitted with Rhabdomyolysis found to have iron deficiency anemia.  Patient c/o intermittent constipation and diarrhea.     Today patient appears comfortable with no new complaint. No abdominal pain, N/V, hematemesis, hematochezia, melena, fever, chills, chest pain, SOB, cough or diarrhea reported.        PAIN MANAGEMENT:  Pain Scale:                 0/10  Pain Location:      Prior Colonoscopy:  No prior colonoscopy    PAST MEDICAL HISTORY  Schizophrenia  HTN      PAST SURGICAL HISTORY  No significant past surgical history      Allergies    Allergy Status Unknown    Intolerances  None       SOCIAL HISTORY  Advanced Directives:       [ X ] Full Code       [  ] DNR  Marital Status:         [  ] M      [ X ] S      [  ] D       [  ] W  Children:       [ X ] Yes      [  ] No  Occupation:        [  ] Employed       [ X ] Unemployed       [  ] Retired  Diet:       [ X ] Regular       [  ] PEG feeding          [  ] NG tube feeding  Drug Use:           [ X ] Patient denied          [  ] Yes  Alcohol:           [ X ] No             [  ] Yes (socially)         [  ] Yes (chronic)  Tobacco:           [  ] Yes           [ X ] No      FAMILY HISTORY  [X  ] Heart Disease            [ X ] Diabetes             [ X ] HTN             [  ] Colon Cancer             [  ] Stomach Cancer              [  ] Pancreatic Cancer        VITALS  Vital Signs Last 24 Hrs  T(C): 36.7 (05 Jan 2021 17:22), Max: 37.1 (04 Jan 2021 23:07)  T(F): 98 (05 Jan 2021 17:22), Max: 98.7 (04 Jan 2021 23:07)  HR: 60 (05 Jan 2021 17:22) (60 - 74)  BP: 157/61 (05 Jan 2021 17:22) (135/68 - 163/52)   RR: 17 (05 Jan 2021 17:22) (16 - 18)  SpO2: 95% (05 Jan 2021 17:22) (95% - 98%)       MEDICATIONS  (STANDING):  atorvastatin 40 milliGRAM(s) Oral at bedtime  dextrose 40% Gel 15 Gram(s) Oral once  dextrose 5%. 1000 milliLiter(s) (50 mL/Hr) IV Continuous <Continuous>  dextrose 5%. 1000 milliLiter(s) (100 mL/Hr) IV Continuous <Continuous>  dextrose 50% Injectable 25 Gram(s) IV Push once  enoxaparin Injectable 40 milliGRAM(s) SubCutaneous daily  ergocalciferol 64940 Unit(s) Oral <User Schedule>  gabapentin 200 milliGRAM(s) Oral every 12 hours  glucagon  Injectable 1 milliGRAM(s) IntraMuscular once  insulin glargine Injectable (LANTUS) 10 Unit(s) SubCutaneous at bedtime  insulin lispro (ADMELOG) corrective regimen sliding scale   SubCutaneous Before meals and at bedtime  insulin lispro Injectable (ADMELOG) 3 Unit(s) SubCutaneous three times a day before meals  iohexol 300 mG (iodine)/mL Oral Solution 30 milliLiter(s) Oral once  labetalol 100 milliGRAM(s) Oral two times a day  losartan 25 milliGRAM(s) Oral daily  pantoprazole    Tablet 40 milliGRAM(s) Oral before breakfast  polyethylene glycol 3350 17 Gram(s) Oral daily    MEDICATIONS  (PRN):  bisacodyl 5 milliGRAM(s) Oral every 12 hours PRN Constipation  traMADol 25 milliGRAM(s) Oral every 8 hours PRN Severe Pain (7 - 10)                            10.4   8.19  )-----------( 298      ( 05 Jan 2021 06:41 )             33.0       01-05    138  |  104  |  14  ----------------------------<  165<H>  3.8   |  29  |  0.58    Ca    8.4      05 Jan 2021 06:41  Phos  2.8     01-05  Mg     1.9     01-05      Occult Blood, Feces (01.04.21 @ 22:56)   Occult Blood, Feces: Negative   Occult Blood, Feces (01.04.21 @ 14:14)   Occult Blood, Feces: Negative       EXAM:  CT ABDOMEN AND PELVIS OC                            PROCEDURE DATE:  01/05/2021          INTERPRETATION:  CLINICAL INFORMATION: Iron deficiency anemia    COMPARISON: None.    PROCEDURE:  CT of the Abdomen and Pelvis was performed without intravenous contrast.  Intravenous contrast: None.  Oral contrast: positive contrast was administered.  Sagittal and coronal reformats were performed.    FINDINGS: Evaluation of the abdomen and pelvis is limited by lack of IV contrast.  LOWER CHEST: Mildbilateral pleural effusions. Small bilateral atelectasis. Small calcified granuloma in the lingula.    LIVER: Within normal limits.  BILE DUCTS: Normal caliber.  GALLBLADDER: Within normal limits.  SPLEEN: Within normal limits.  PANCREAS: Atrophic with fatty replacement  ADRENALS: Within normal limits.  KIDNEYS/URETERS: Within normal limits.    BLADDER: Within normal limits.  REPRODUCTIVE ORGANS: The uterus appears unremarkable. 1.4 cm cystic lesion in the left adnexa, suggestive of a left ovarian cyst.    BOWEL: No bowel obstruction or grossly thickened bowel wall. No CT evidence for a colonic mass. Appendix within normal limits.  PERITONEUM: No free air or ascites.  VESSELS: Atherosclerotic disease.  RETROPERITONEUM/LYMPH NODES: No lymphadenopathy.  ABDOMINAL WALL: Within normal limits.  BONES: Mild degenerative spondylosis. Grade 1 anterolisthesis at L5-S1.    IMPRESSION:    No bowel obstruction or grossly thickened bowel wall. No CT evidence for a colonic mass. Appendix within normal limits. If clinically indicated, colonoscopy may be pursued for further evaluation.    1.4 cm cystic lesion in the left adnexa, suggestive of a left ovarian cyst.    Mild bilateral pleural effusions.

## 2021-01-05 NOTE — PROGRESS NOTE ADULT - SUBJECTIVE AND OBJECTIVE BOX
Patient is a 75y old  Female who presents with a chief complaint of Rhabdomyolysis (04 Jan 2021 15:52)    pt seen in tele [ x ], reg med floor [   ], bed [x  ], chair at bedside [   ], a+o x3 [ x ], lethargic [  ],    nad [x  ]      Allergies    Allergy Status Unknown        Vitals    T(F): 97.7 (01-05-21 @ 05:11), Max: 98.7 (01-04-21 @ 23:07)  HR: 61 (01-05-21 @ 05:11) (61 - 76)  BP: 135/68 (01-05-21 @ 05:11) (135/68 - 171/46)  RR: 16 (01-05-21 @ 05:11) (16 - 18)  SpO2: 96% (01-05-21 @ 05:11) (96% - 98%)  Wt(kg): --  CAPILLARY BLOOD GLUCOSE      POCT Blood Glucose.: 252 mg/dL (04 Jan 2021 21:47)      Labs                          10.9   7.91  )-----------( 239      ( 04 Jan 2021 07:07 )             35.1       01-04    140  |  107  |  13  ----------------------------<  126<H>  4.0   |  26  |  0.55    Ca    8.5      04 Jan 2021 07:08  Phos  2.8     01-04  Mg     2.1     01-04        CARDIAC MARKERS ( 03 Jan 2021 07:01 )  x     / x     / 166 U/L / x     / x            .Blood Blood-Peripheral  12-31 @ 06:37   No growth to date.  --  --          Radiology Results      Meds    MEDICATIONS  (STANDING):  acetaminophen   Tablet .. 1000 milliGRAM(s) Oral every 8 hours  atorvastatin 40 milliGRAM(s) Oral at bedtime  dextrose 40% Gel 15 Gram(s) Oral once  dextrose 5%. 1000 milliLiter(s) (50 mL/Hr) IV Continuous <Continuous>  dextrose 5%. 1000 milliLiter(s) (100 mL/Hr) IV Continuous <Continuous>  dextrose 50% Injectable 25 Gram(s) IV Push once  enoxaparin Injectable 40 milliGRAM(s) SubCutaneous daily  ergocalciferol 92231 Unit(s) Oral <User Schedule>  gabapentin 200 milliGRAM(s) Oral every 12 hours  glucagon  Injectable 1 milliGRAM(s) IntraMuscular once  insulin glargine Injectable (LANTUS) 10 Unit(s) SubCutaneous at bedtime  insulin lispro (ADMELOG) corrective regimen sliding scale   SubCutaneous Before meals and at bedtime  insulin lispro Injectable (ADMELOG) 3 Unit(s) SubCutaneous three times a day before meals  labetalol 100 milliGRAM(s) Oral two times a day  losartan 25 milliGRAM(s) Oral daily  pantoprazole    Tablet 40 milliGRAM(s) Oral before breakfast  polyethylene glycol 3350 17 Gram(s) Oral daily      MEDICATIONS  (PRN):  bisacodyl 5 milliGRAM(s) Oral every 12 hours PRN Constipation  traMADol 25 milliGRAM(s) Oral every 8 hours PRN Severe Pain (7 - 10)      Physical Exam    Neuro :  no focal deficits  Respiratory: CTA B/L  CV: RRR, S1S2, no murmurs,   Abdominal: Soft, NT, ND +BS,  Extremities: No edema, + peripheral pulses    ASSESSMENT      s/p fall  Rhabdomyolysis   uncontrolled htn  r/o acs  hyperglycemia   r/o dm   myalgia   hypokalemia   iron def anemia  h/o Schizophrenia        PLAN      cont tele,   acs protocol,   trop x 3 noted above  cont aspirin, statin,   cardio f/u   cont labetalol and losartan  echo Ejection Fraction Visual Estimate: >55 %, Mild diastolic dysfunction (stage I) noted.  ck wnl  cont  ivf,   hgba1c 10.6 noted    cont hss  endo f/u   Lantus 10 units hs   Ademlog  3units  TID pre meals with SS coverage.   Diabetic education   nutrition eval  phys tx eval noted and rec brunilda  pain mgmt eval  cont gabapentin 200mg q12  f/u stool occult blood  gi cons  f/u cea  no signs of active bleeding  cont current meds         Patient is a 75y old  Female who presents with a chief complaint of Rhabdomyolysis (04 Jan 2021 15:52)    pt seen in tele [  ], reg med floor [ x  ], bed [x  ], chair at bedside [   ], a+o x3 [ x ], lethargic [  ],    nad [x  ]      Allergies    Allergy Status Unknown        Vitals    T(F): 97.7 (01-05-21 @ 05:11), Max: 98.7 (01-04-21 @ 23:07)  HR: 61 (01-05-21 @ 05:11) (61 - 76)  BP: 135/68 (01-05-21 @ 05:11) (135/68 - 171/46)  RR: 16 (01-05-21 @ 05:11) (16 - 18)  SpO2: 96% (01-05-21 @ 05:11) (96% - 98%)  Wt(kg): --  CAPILLARY BLOOD GLUCOSE      POCT Blood Glucose.: 252 mg/dL (04 Jan 2021 21:47)      Labs                          10.9   7.91  )-----------( 239      ( 04 Jan 2021 07:07 )             35.1       01-04    140  |  107  |  13  ----------------------------<  126<H>  4.0   |  26  |  0.55    Ca    8.5      04 Jan 2021 07:08  Phos  2.8     01-04  Mg     2.1     01-04      Carcinoembryonic Antigen (01.04.21 @ 10:04)   Carcinoembryonic Antigen: 1.5Occult Blood, Feces (01.04.21 @ 22:56)   Occult Blood, Feces: Negative   CARDIAC MARKERS ( 03 Jan 2021 07:01 )  x     / x     / 166 U/L / x     / x            .Blood Blood-Peripheral  12-31 @ 06:37   No growth to date.  --  --          Radiology Results      Meds    MEDICATIONS  (STANDING):  acetaminophen   Tablet .. 1000 milliGRAM(s) Oral every 8 hours  atorvastatin 40 milliGRAM(s) Oral at bedtime  dextrose 40% Gel 15 Gram(s) Oral once  dextrose 5%. 1000 milliLiter(s) (50 mL/Hr) IV Continuous <Continuous>  dextrose 5%. 1000 milliLiter(s) (100 mL/Hr) IV Continuous <Continuous>  dextrose 50% Injectable 25 Gram(s) IV Push once  enoxaparin Injectable 40 milliGRAM(s) SubCutaneous daily  ergocalciferol 39268 Unit(s) Oral <User Schedule>  gabapentin 200 milliGRAM(s) Oral every 12 hours  glucagon  Injectable 1 milliGRAM(s) IntraMuscular once  insulin glargine Injectable (LANTUS) 10 Unit(s) SubCutaneous at bedtime  insulin lispro (ADMELOG) corrective regimen sliding scale   SubCutaneous Before meals and at bedtime  insulin lispro Injectable (ADMELOG) 3 Unit(s) SubCutaneous three times a day before meals  labetalol 100 milliGRAM(s) Oral two times a day  losartan 25 milliGRAM(s) Oral daily  pantoprazole    Tablet 40 milliGRAM(s) Oral before breakfast  polyethylene glycol 3350 17 Gram(s) Oral daily      MEDICATIONS  (PRN):  bisacodyl 5 milliGRAM(s) Oral every 12 hours PRN Constipation  traMADol 25 milliGRAM(s) Oral every 8 hours PRN Severe Pain (7 - 10)      Physical Exam    Neuro :  no focal deficits  Respiratory: CTA B/L  CV: RRR, S1S2, no murmurs,   Abdominal: Soft, NT, ND +BS,  Extremities: No edema, + peripheral pulses    ASSESSMENT      s/p fall  Rhabdomyolysis   uncontrolled htn  hyperglycemia 2nd to dm  myalgia   hypokalemia   iron def anemia  h/o Schizophrenia        PLAN        trop x 3 noted above  cont aspirin, statin,   cardio f/u   cont labetalol and losartan  echo Ejection Fraction Visual Estimate: >55 %, Mild diastolic dysfunction (stage I) noted.  ck wnl  cont  ivf,   hgba1c 10.6 noted    cont hss  endo f/u   Lantus 10 units hs   Ademlog  3units  TID pre meals with SS coverage.   Diabetic education   nutrition eval  pain mgmt f/u  cont gabapentin 200mg q12  stool occult blood neg  gi f/u  cea neg noted above  no signs of active bleeding   f/u ct abd pelv   phys tx eval noted and rec phys tx 3-5x/week x 4-6 weeks at rehabilitation facility; subacute rehabilitation facility  cont current meds   d/c plan for am pending ct results

## 2021-01-05 NOTE — PROGRESS NOTE ADULT - SUBJECTIVE AND OBJECTIVE BOX
CHIEF COMPLAINT:Patient is a 75y old  Female who presents with a chief complaint of Rhabdomyolysis.pt appears comfortable.    	  REVIEW OF SYSTEMS:  CONSTITUTIONAL: No fever, weight loss, or fatigue  EYES: No eye pain, visual disturbances, or discharge  ENT:  No difficulty hearing, tinnitus, vertigo; No sinus or throat pain  NECK: No pain or stiffness  RESPIRATORY: No cough, wheezing, chills or hemoptysis; No Shortness of Breath  CARDIOVASCULAR: No chest pain, palpitations, passing out, dizziness, or leg swelling  GASTROINTESTINAL: No abdominal or epigastric pain. No nausea, vomiting, or hematemesis; No diarrhea or constipation. No melena or hematochezia.  GENITOURINARY: No dysuria, frequency, hematuria, or incontinence  NEUROLOGICAL: No headaches, memory loss, loss of strength, numbness, or tremors  SKIN: No itching, burning, rashes, or lesions   LYMPH Nodes: No enlarged glands  ENDOCRINE: No heat or cold intolerance; No hair loss  MUSCULOSKELETAL: No joint pain or swelling; No muscle, back, or extremity pain  PSYCHIATRIC: No depression, anxiety, mood swings, or difficulty sleeping  HEME/LYMPH: No easy bruising, or bleeding gums  ALLERGY AND IMMUNOLOGIC: No hives or eczema	        PHYSICAL EXAM:  T(C): 36.5 (01-05-21 @ 05:11), Max: 37.1 (01-04-21 @ 23:07)  HR: 61 (01-05-21 @ 05:11) (61 - 76)  BP: 135/68 (01-05-21 @ 05:11) (135/68 - 171/46)  RR: 16 (01-05-21 @ 05:11) (16 - 18)  SpO2: 96% (01-05-21 @ 05:11) (96% - 98%)  Wt(kg): --  I&O's Summary      Appearance: Normal	  HEENT:   Normal oral mucosa, PERRL, EOMI	  Lymphatic: No lymphadenopathy  Cardiovascular: Normal S1 S2, No JVD, No murmurs, No edema  Respiratory: Lungs clear to auscultation	  Psychiatry: A & O x 3, Mood & affect appropriate  Gastrointestinal:  Soft, Non-tender, + BS	  Skin: No rashes, No ecchymoses, No cyanosis	  Neurologic: Non-focal  Extremities: Normal range of motion, No clubbing, cyanosis or edema  Vascular: Peripheral pulses palpable 2+ bilaterally    MEDICATIONS  (STANDING):  acetaminophen   Tablet .. 1000 milliGRAM(s) Oral every 8 hours  atorvastatin 40 milliGRAM(s) Oral at bedtime  dextrose 40% Gel 15 Gram(s) Oral once  dextrose 5%. 1000 milliLiter(s) (50 mL/Hr) IV Continuous <Continuous>  dextrose 5%. 1000 milliLiter(s) (100 mL/Hr) IV Continuous <Continuous>  dextrose 50% Injectable 25 Gram(s) IV Push once  enoxaparin Injectable 40 milliGRAM(s) SubCutaneous daily  ergocalciferol 11047 Unit(s) Oral <User Schedule>  gabapentin 200 milliGRAM(s) Oral every 12 hours  glucagon  Injectable 1 milliGRAM(s) IntraMuscular once  insulin glargine Injectable (LANTUS) 10 Unit(s) SubCutaneous at bedtime  insulin lispro (ADMELOG) corrective regimen sliding scale   SubCutaneous Before meals and at bedtime  insulin lispro Injectable (ADMELOG) 3 Unit(s) SubCutaneous three times a day before meals  iohexol 300 mG (iodine)/mL Oral Solution 30 milliLiter(s) Oral once  labetalol 100 milliGRAM(s) Oral two times a day  losartan 25 milliGRAM(s) Oral daily  pantoprazole    Tablet 40 milliGRAM(s) Oral before breakfast  polyethylene glycol 3350 17 Gram(s) Oral daily    	  	  LABS:	 	                        10.4   8.19  )-----------( 298      ( 05 Jan 2021 06:41 )             33.0     01-05    138  |  104  |  14  ----------------------------<  165<H>  3.8   |  29  |  0.58    Ca    8.4      05 Jan 2021 06:41  Phos  2.8     01-05  Mg     1.9     01-05      proBNP: Serum Pro-Brain Natriuretic Peptide: 3429 pg/mL (12-30 @ 19:30)    Lipid Profile: Cholesterol 256  LDL --  HDL 41    Cholesterol 255  LDL --  HDL 47        TSH: Thyroid Stimulating Hormone, Serum: 1.44 uU/mL (12-31 @ 06:47)  Thyroid Stimulating Hormone, Serum: 1.20 uU/mL (12-30 @ 19:30)      	      occultx2-.

## 2021-01-05 NOTE — PROGRESS NOTE ADULT - SUBJECTIVE AND OBJECTIVE BOX
Interval Events:  Pt in NAD     Allergies    Allergy Status Unknown    Intolerances      Endocrine/Metabolic Medications:  atorvastatin 40 milliGRAM(s) Oral at bedtime  dextrose 40% Gel 15 Gram(s) Oral once  dextrose 50% Injectable 25 Gram(s) IV Push once  glucagon  Injectable 1 milliGRAM(s) IntraMuscular once  insulin glargine Injectable (LANTUS) 10 Unit(s) SubCutaneous at bedtime  insulin lispro (ADMELOG) corrective regimen sliding scale   SubCutaneous Before meals and at bedtime  insulin lispro Injectable (ADMELOG) 3 Unit(s) SubCutaneous three times a day before meals      Vital Signs Last 24 Hrs  T(C): 36.5 (05 Jan 2021 05:11), Max: 37.1 (04 Jan 2021 23:07)  T(F): 97.7 (05 Jan 2021 05:11), Max: 98.7 (04 Jan 2021 23:07)  HR: 61 (05 Jan 2021 05:11) (61 - 76)  BP: 135/68 (05 Jan 2021 05:11) (135/68 - 163/52)  BP(mean): --  RR: 16 (05 Jan 2021 05:11) (16 - 18)  SpO2: 96% (05 Jan 2021 05:11) (96% - 98%)      PHYSICAL EXAM  All physical exam findings normal, except those marked:  General:	Alert, active, cooperative, NAD, well hydrated  .		[] Abnormal:  Neck		Normal: supple, no cervical adenopathy, no palpable thyroid  .		[] Abnormal:  Cardiovascular	Normal: regular rate, normal S1, S2, no murmurs  .		[] Abnormal:  Respiratory	Normal: no chest wall deformity, normal respiratory pattern, CTA B/L  .		[] Abnormal:  Abdominal	Normal: soft, ND, NT, bowel sounds present, no masses, no organomegaly  .		[] Abnormal:  		Normal normal genitalia, testes descended, circumcised/uncircumcised  .		Srinivas stage:			Breast srinivas:  .		Menstrual history:  .		[] Abnormal:  Extremities	Normal: FROM x4  .		[] Abnormal:  Skin		Normal: intact and not indurated, no rash, no acanthosis nigricans  .		[] Abnormal:  Neurologic	Normal: grossly intact  .		[] Abnormal:    LABS                        10.4   8.19  )-----------( 298      ( 05 Jan 2021 06:41 )             33.0                               138    |  104    |  14                  Calcium: 8.4   / iCa: x      (01-05 @ 06:41)    ----------------------------<  165       Magnesium: 1.9                              3.8     |  29     |  0.58             Phosphorous: 2.8        CAPILLARY BLOOD GLUCOSE      POCT Blood Glucose.: 142 mg/dL (05 Jan 2021 08:03)  POCT Blood Glucose.: 252 mg/dL (04 Jan 2021 21:47)  POCT Blood Glucose.: 184 mg/dL (04 Jan 2021 16:54)        Assesment/plan

## 2021-01-05 NOTE — PROGRESS NOTE ADULT - ASSESSMENT
75 year old Female  with  PMHx of scizophrenia not on any meds,walks with a walker  presented to the ED after fall yesterday 12/29 and generalized weakness.    Endo is consulted for new diagnosis of DM.     1. New diagnosis of DM   A1c: 10.8  now better controlled   Continue Lantus 10 units hs   Ademlog  3units  TID pre meals with SS coverage.   Diabetic education   nutrition eval  - DC plan: discharge on insulin, same regimen as above. OP followup to eventually start on oral hypoglycemics.   d/w hs     Anemia- w/u per prim team  Gi eval noted  75 year old Female  with  PMHx of scizophrenia not on any meds,walks with a walker  presented to the ED after fall yesterday 12/29 and generalized weakness.    Endo is consulted for new diagnosis of DM.     1. New diagnosis of DM   A1c: 10.8  now better controlled   Continue Lantus 10 units hs   Ademlog  3units  TID pre meals with SS coverage.   Diabetic education   nutrition eval  - DC plan: discharge on insulin, same regimen as above. OP followup to eventually start on oral hypoglycemics.   d/w hs     Anemia- w/u per prim team  Gi eval noted.

## 2021-01-05 NOTE — PROGRESS NOTE ADULT - ASSESSMENT
75 year old Female  with  PMHx of schizophrenia not on any meds,walks with a walker  presented to the ED after fall on 12/29 and generalized weakness,new onset Dm,uncontrolled HTN and abnormal EKG.  1.HTN-cont labetalol 100mg bid and cozaar 25mg qd.  2.DM-Insulin,Endo f/u.  3.Fe def anemia-CT abd and pelvis.  4.Lipid d/o-statin.  5.GI and DVT prophylaxis.

## 2021-01-05 NOTE — PROGRESS NOTE ADULT - PROBLEM SELECTOR PLAN 1
pt with left hip pain s/p fall.  CK trending down. Pt with anemia, pending CT abdomen today.  - Pain is generalized but mainly on right side of body  -Nonopioid Recommendations  - Avoid NSAIDs due to CRISTY   - Continue gabapentin to 200mg po q 12 hours  - acetaminophen 1000gram po q 8 hours for 3 days  opioid recommendation  - tramadol 25mg po q8h prn severe pain. Monitor for respiratory depression/ sedation  Bowel Regimen   - miralax.

## 2021-01-05 NOTE — PROGRESS NOTE ADULT - ASSESSMENT
1. Iron deficiency anemia  2. No evidence of acute GI blkeeding  3. R/o chronic GI bleeding  4. Constipation    Suggestions:    1. Monitor H/H  2. Transfuse PRBC as needed  3. Protonix daily  4. Avoid NSAID  5. Colonoscopy   6. DVT prophylaxis

## 2021-01-05 NOTE — PROGRESS NOTE ADULT - SUBJECTIVE AND OBJECTIVE BOX
Source of information: EFRAIN DUNCAN, Chart review  Patient language: English  : n/a    HPI:   75 year old Female  with  PMHx of scizophrenia not on any meds,walks with a walker  presented to the ED after fall yesterday 12/29 and generalized weakness. Patient admits that she was going to get the news paper then she slipped and wasnot able to stand and was left on the floor for almost a day until her super found her on floor . Patient reports no LOC, chest pain or shortness of breath.  Patient endorses pain to the hip, denies numbness, focal weakness. diarrhea, constipation or abdominal pain . she was admitted to the hospital 1 year ago for generalized weakness and was discharged on medications for HTN and schizophrenia that was diagnosed at that time but she didnot pick the medications and never went to a doctor since then . The patient vomited once while interviewing her,  (30 Dec 2020 15:34)      This is a Patient is a 75y old  Female who presents with a chief complaint of left hip pain and generalized arm and leg pain. Found to have Rhabdomyolysis.   . Pt seen and examined at bedside. Pt reports PAIN SCORE:  10/10 with movement, no pain while laying SCALE USED: (1-10 VNRS). Pain adequately managed on current pain regimen. Pt describes pain as aching, non- radiating, alleviated by pain medications, exacerbated by movement. Pt tolerating PO diet. Pt denies lethargy, nausea, vomiting, constipation and itchiness. Reports last BM 1/4, liquid. Patient stated goal for pain control: to be able to take deep breaths, utilize incentive spirometer, get out of bed to chair and ambulate with tolerable pain control. Pt with anemia, plan for CT today.     PAST MEDICAL & SURGICAL HISTORY:  Schizophrenia    No significant past surgical history        FAMILY HISTORY:  No pertinent family history in first degree relatives        Social History:   [X ]Denies ETOH use, illicit drug use, and smoking     Allergies    NKA    MEDICATIONS  (STANDING):  atorvastatin 40 milliGRAM(s) Oral at bedtime  dextrose 40% Gel 15 Gram(s) Oral once  dextrose 5%. 1000 milliLiter(s) (50 mL/Hr) IV Continuous <Continuous>  dextrose 5%. 1000 milliLiter(s) (100 mL/Hr) IV Continuous <Continuous>  dextrose 50% Injectable 25 Gram(s) IV Push once  enoxaparin Injectable 40 milliGRAM(s) SubCutaneous daily  ergocalciferol 22681 Unit(s) Oral <User Schedule>  gabapentin 200 milliGRAM(s) Oral every 12 hours  glucagon  Injectable 1 milliGRAM(s) IntraMuscular once  insulin glargine Injectable (LANTUS) 10 Unit(s) SubCutaneous at bedtime  insulin lispro (ADMELOG) corrective regimen sliding scale   SubCutaneous Before meals and at bedtime  insulin lispro Injectable (ADMELOG) 3 Unit(s) SubCutaneous three times a day before meals  iohexol 300 mG (iodine)/mL Oral Solution 30 milliLiter(s) Oral once  labetalol 100 milliGRAM(s) Oral two times a day  losartan 25 milliGRAM(s) Oral daily  pantoprazole    Tablet 40 milliGRAM(s) Oral before breakfast  polyethylene glycol 3350 17 Gram(s) Oral daily    MEDICATIONS  (PRN):  bisacodyl 5 milliGRAM(s) Oral every 12 hours PRN Constipation  traMADol 25 milliGRAM(s) Oral every 8 hours PRN Severe Pain (7 - 10)      Vital Signs Last 24 Hrs  T(C): 36.7 (05 Jan 2021 13:49), Max: 37.1 (04 Jan 2021 23:07)  T(F): 98 (05 Jan 2021 13:49), Max: 98.7 (04 Jan 2021 23:07)  HR: 74 (05 Jan 2021 13:49) (61 - 74)  BP: 160/60 (05 Jan 2021 13:49) (135/68 - 163/52)  BP(mean): --  RR: 18 (05 Jan 2021 13:49) (16 - 18)  SpO2: 96% (05 Jan 2021 13:49) (96% - 98%)  COVID-19 PCR: NotDetec (04 Jan 2021 10:59)  SARS-CoV-2: NotDetec (30 Dec 2020 15:27)    LABS: Reviewed                          10.4   8.19  )-----------( 298      ( 05 Jan 2021 06:41 )             33.0     01-05    138  |  104  |  14  ----------------------------<  165<H>  3.8   |  29  |  0.58    Ca    8.4      05 Jan 2021 06:41  Phos  2.8     01-05  Mg     1.9     01-05            CAPILLARY BLOOD GLUCOSE      POCT Blood Glucose.: 217 mg/dL (05 Jan 2021 12:13)  POCT Blood Glucose.: 142 mg/dL (05 Jan 2021 08:03)  POCT Blood Glucose.: 252 mg/dL (04 Jan 2021 21:47)  POCT Blood Glucose.: 184 mg/dL (04 Jan 2021 16:54)    COVID-19 PCR: NotDetec (04 Jan 2021 10:59)  SARS-CoV-2: NotDetec (30 Dec 2020 15:27)      Radiology: Reviewed  < from: CT Abdomen and Pelvis w/ Oral Cont (01.05.21 @ 14:47) >    EXAM:  CT ABDOMEN AND PELVIS OC                            PROCEDURE DATE:  01/05/2021          INTERPRETATION:  CLINICAL INFORMATION: Iron deficiency anemia    COMPARISON: None.    PROCEDURE:  CT of the Abdomen and Pelvis was performed without intravenous contrast.  Intravenous contrast: None.  Oral contrast: positive contrast was administered.  Sagittal and coronal reformats were performed.    FINDINGS: Evaluation of the abdomen and pelvis is limited by lack of IV contrast.  LOWER CHEST: Mildbilateral pleural effusions. Small bilateral atelectasis. Small calcified granuloma in the lingula.    LIVER: Within normal limits.  BILE DUCTS: Normal caliber.  GALLBLADDER: Within normal limits.  SPLEEN: Within normal limits.  PANCREAS: Atrophic with fatty replacement  ADRENALS: Within normal limits.  KIDNEYS/URETERS: Within normal limits.    BLADDER: Within normal limits.  REPRODUCTIVE ORGANS: The uterus appears unremarkable. 1.4 cm cystic lesion in the left adnexa, suggestive of a left ovarian cyst.    BOWEL: No bowel obstruction or grossly thickened bowel wall. No CT evidence for a colonic mass. Appendix within normal limits.  PERITONEUM: No free air or ascites.  VESSELS: Atherosclerotic disease.  RETROPERITONEUM/LYMPH NODES: No lymphadenopathy.  ABDOMINAL WALL: Within normal limits.  BONES: Mild degenerative spondylosis. Grade 1 anterolisthesis at L5-S1.    IMPRESSION:    No bowel obstruction or grossly thickened bowel wall. No CT evidence for a colonic mass. Appendix within normal limits. If clinically indicated, colonoscopy may be pursued for further evaluation.    1.4 cm cystic lesion in the left adnexa, suggestive of a left ovarian cyst.    Mild bilateral pleural effusions.            YESENIA VAZQUEZ MD; Attending Radiologist  This document has been electronically signed. Jan 5 2021  3:41PM    < end of copied text >      ORT Score -   Family Hx of substance abuse	Female	      Male  Alcohol 	                                           1                     3  Illegal drugs	                                   2                     3  Rx drugs                                           4 	                  4  Personal Hx of substance abuse		  Alcohol 	                                          3	                  3  Illegal drugs                                     4	                  4  Rx drugs                                            5 	                  5  Age between 16- 45 years	           1                     1  hx preadolescent sexual abuse	   3 	                  0  Psychological disease		  ADD, OCD, bipolar, schizophrenia   2	          2  Depression                                           1 	          1  Total: 0    a score of 3 or lower indicates low risk for opioid abuse		  a score of 4-7 indicates moderate risk for opioid abuse		  a score of 8 or higher indicates high risk for opioid abuse    REVIEW OF SYSTEMS:  CONSTITUTIONAL: No fever or fatigue  RESPIRATORY: No cough, wheezing, chills or hemoptysis; No shortness of breath  CARDIOVASCULAR: No chest pain, palpitations, dizziness, or leg swelling  GASTROINTESTINAL: No abdominal or epigastric pain. No nausea, vomiting; No diarrhea or constipation.   GENITOURINARY: No dysuria, frequency, hematuria, retention or incontinence  MUSCULOSKELETAL: + left hip pain and generalized arm and leg pain; No swelling; no upper or lower motor strength weakness, no saddle anesthesia, bowel/bladder incontinence, no falls   NEURO: No headaches, No numbness/tingling b/l LE, No weakness  PSYCHIATRIC: + hx schizophrenia; No depression, anxiety, mood swings, or difficulty sleeping    PHYSICAL EXAM:  GENERAL:  Alert & Oriented X3, NAD, Good concentration  CHEST/LUNG: Clear to auscultation bilaterally; No rales, rhonchi, wheezing, or rubs  HEART: Regular rate and rhythm; No murmurs, rubs, or gallops  ABDOMEN: Soft, Nontender, Nondistended; Bowel sounds present  EXTREMITIES:  2+ Peripheral Pulses, No cyanosis, or edema  MUSCULOSKELETAL: + left hip, bilateral arms, bilateral legs tender to palpation; + generalized weakness; moves all extremities equally against gravity; ROM intact; negative SLR  SKIN: + ecchymosis; No rashes or lesions    Risk factors associated with adverse outcomes related to opioid treatment  [ ]  Concurrent benzodiazepine use  [ ]  History/ Active substance use or alcohol use disorder  [ ] Psychiatric co-morbidity  [ ] Sleep apnea  [ ] COPD  [ ] BMI> 35  [ ] Liver dysfunction  [ ] Renal dysfunction  [ ] CHF  [ ] Smoker  [X ]  Age > 60 years    [X ]  [place]  Reviewed and Copied to Chart. See below.    Plan of care and goal oriented pain management treatment options were discussed with patient and /or primary care giver; all questions and concerns were addressed and care was aligned with patient's wishes.    Educated patient on goal oriented pain management treatment options     01-05-21 @ 16:31

## 2021-01-06 ENCOUNTER — TRANSCRIPTION ENCOUNTER (OUTPATIENT)
Age: 76
End: 2021-01-06

## 2021-01-06 VITALS
OXYGEN SATURATION: 96 % | HEART RATE: 70 BPM | DIASTOLIC BLOOD PRESSURE: 68 MMHG | SYSTOLIC BLOOD PRESSURE: 156 MMHG | RESPIRATION RATE: 18 BRPM | TEMPERATURE: 98 F

## 2021-01-06 LAB
GLUCOSE BLDC GLUCOMTR-MCNC: 172 MG/DL — HIGH (ref 70–99)
GLUCOSE BLDC GLUCOMTR-MCNC: 311 MG/DL — HIGH (ref 70–99)

## 2021-01-06 PROCEDURE — 83036 HEMOGLOBIN GLYCOSYLATED A1C: CPT

## 2021-01-06 PROCEDURE — 99285 EMERGENCY DEPT VISIT HI MDM: CPT | Mod: 25

## 2021-01-06 PROCEDURE — 83540 ASSAY OF IRON: CPT

## 2021-01-06 PROCEDURE — 85027 COMPLETE CBC AUTOMATED: CPT

## 2021-01-06 PROCEDURE — 93005 ELECTROCARDIOGRAM TRACING: CPT

## 2021-01-06 PROCEDURE — 83880 ASSAY OF NATRIURETIC PEPTIDE: CPT

## 2021-01-06 PROCEDURE — 82378 CARCINOEMBRYONIC ANTIGEN: CPT

## 2021-01-06 PROCEDURE — 71045 X-RAY EXAM CHEST 1 VIEW: CPT

## 2021-01-06 PROCEDURE — 85379 FIBRIN DEGRADATION QUANT: CPT

## 2021-01-06 PROCEDURE — 80061 LIPID PANEL: CPT

## 2021-01-06 PROCEDURE — 85045 AUTOMATED RETICULOCYTE COUNT: CPT

## 2021-01-06 PROCEDURE — 83735 ASSAY OF MAGNESIUM: CPT

## 2021-01-06 PROCEDURE — 82140 ASSAY OF AMMONIA: CPT

## 2021-01-06 PROCEDURE — 74176 CT ABD & PELVIS W/O CONTRAST: CPT

## 2021-01-06 PROCEDURE — 73522 X-RAY EXAM HIPS BI 3-4 VIEWS: CPT

## 2021-01-06 PROCEDURE — 82962 GLUCOSE BLOOD TEST: CPT

## 2021-01-06 PROCEDURE — 86769 SARS-COV-2 COVID-19 ANTIBODY: CPT

## 2021-01-06 PROCEDURE — 72125 CT NECK SPINE W/O DYE: CPT

## 2021-01-06 PROCEDURE — 70450 CT HEAD/BRAIN W/O DYE: CPT

## 2021-01-06 PROCEDURE — 82746 ASSAY OF FOLIC ACID SERUM: CPT

## 2021-01-06 PROCEDURE — 93306 TTE W/DOPPLER COMPLETE: CPT

## 2021-01-06 PROCEDURE — 36415 COLL VENOUS BLD VENIPUNCTURE: CPT

## 2021-01-06 PROCEDURE — 85652 RBC SED RATE AUTOMATED: CPT

## 2021-01-06 PROCEDURE — 84550 ASSAY OF BLOOD/URIC ACID: CPT

## 2021-01-06 PROCEDURE — 82306 VITAMIN D 25 HYDROXY: CPT

## 2021-01-06 PROCEDURE — U0005: CPT

## 2021-01-06 PROCEDURE — 82550 ASSAY OF CK (CPK): CPT

## 2021-01-06 PROCEDURE — 99231 SBSQ HOSP IP/OBS SF/LOW 25: CPT

## 2021-01-06 PROCEDURE — 83550 IRON BINDING TEST: CPT

## 2021-01-06 PROCEDURE — 85025 COMPLETE CBC W/AUTO DIFF WBC: CPT

## 2021-01-06 PROCEDURE — 82728 ASSAY OF FERRITIN: CPT

## 2021-01-06 PROCEDURE — 84484 ASSAY OF TROPONIN QUANT: CPT

## 2021-01-06 PROCEDURE — 83605 ASSAY OF LACTIC ACID: CPT

## 2021-01-06 PROCEDURE — 85610 PROTHROMBIN TIME: CPT

## 2021-01-06 PROCEDURE — 0225U NFCT DS DNA&RNA 21 SARSCOV2: CPT

## 2021-01-06 PROCEDURE — 93970 EXTREMITY STUDY: CPT

## 2021-01-06 PROCEDURE — 84443 ASSAY THYROID STIM HORMONE: CPT

## 2021-01-06 PROCEDURE — 92610 EVALUATE SWALLOWING FUNCTION: CPT

## 2021-01-06 PROCEDURE — 87040 BLOOD CULTURE FOR BACTERIA: CPT

## 2021-01-06 PROCEDURE — 82607 VITAMIN B-12: CPT

## 2021-01-06 PROCEDURE — 84100 ASSAY OF PHOSPHORUS: CPT

## 2021-01-06 PROCEDURE — 82272 OCCULT BLD FECES 1-3 TESTS: CPT

## 2021-01-06 PROCEDURE — 85730 THROMBOPLASTIN TIME PARTIAL: CPT

## 2021-01-06 PROCEDURE — 80053 COMPREHEN METABOLIC PANEL: CPT

## 2021-01-06 PROCEDURE — 80307 DRUG TEST PRSMV CHEM ANLYZR: CPT

## 2021-01-06 PROCEDURE — 80048 BASIC METABOLIC PNL TOTAL CA: CPT

## 2021-01-06 PROCEDURE — 87635 SARS-COV-2 COVID-19 AMP PRB: CPT

## 2021-01-06 RX ORDER — ACETAMINOPHEN 500 MG
1000 TABLET ORAL EVERY 8 HOURS
Refills: 0 | Status: DISCONTINUED | OUTPATIENT
Start: 2021-01-06 | End: 2021-01-06

## 2021-01-06 RX ORDER — INSULIN LISPRO 100/ML
5 VIAL (ML) SUBCUTANEOUS
Refills: 0 | Status: DISCONTINUED | OUTPATIENT
Start: 2021-01-06 | End: 2021-01-06

## 2021-01-06 RX ORDER — LOSARTAN POTASSIUM 100 MG/1
1 TABLET, FILM COATED ORAL
Qty: 0 | Refills: 0 | DISCHARGE
Start: 2021-01-06

## 2021-01-06 RX ORDER — PANTOPRAZOLE SODIUM 20 MG/1
1 TABLET, DELAYED RELEASE ORAL
Qty: 30 | Refills: 0
Start: 2021-01-06 | End: 2021-02-04

## 2021-01-06 RX ORDER — ATORVASTATIN CALCIUM 80 MG/1
1 TABLET, FILM COATED ORAL
Qty: 30 | Refills: 0
Start: 2021-01-06 | End: 2021-02-04

## 2021-01-06 RX ORDER — CHOLECALCIFEROL (VITAMIN D3) 125 MCG
1 CAPSULE ORAL
Qty: 30 | Refills: 0
Start: 2021-01-06 | End: 2021-02-04

## 2021-01-06 RX ORDER — LABETALOL HCL 100 MG
1 TABLET ORAL
Qty: 0 | Refills: 0 | DISCHARGE
Start: 2021-01-06

## 2021-01-06 RX ORDER — GABAPENTIN 400 MG/1
2 CAPSULE ORAL
Qty: 120 | Refills: 0
Start: 2021-01-06 | End: 2021-02-04

## 2021-01-06 RX ADMIN — TRAMADOL HYDROCHLORIDE 25 MILLIGRAM(S): 50 TABLET ORAL at 00:01

## 2021-01-06 RX ADMIN — ENOXAPARIN SODIUM 40 MILLIGRAM(S): 100 INJECTION SUBCUTANEOUS at 11:13

## 2021-01-06 RX ADMIN — GABAPENTIN 200 MILLIGRAM(S): 400 CAPSULE ORAL at 09:08

## 2021-01-06 RX ADMIN — TRAMADOL HYDROCHLORIDE 25 MILLIGRAM(S): 50 TABLET ORAL at 01:02

## 2021-01-06 RX ADMIN — PANTOPRAZOLE SODIUM 40 MILLIGRAM(S): 20 TABLET, DELAYED RELEASE ORAL at 06:21

## 2021-01-06 RX ADMIN — Medication 1: at 08:13

## 2021-01-06 RX ADMIN — Medication 100 MILLIGRAM(S): at 06:21

## 2021-01-06 RX ADMIN — Medication 4: at 12:53

## 2021-01-06 RX ADMIN — LOSARTAN POTASSIUM 25 MILLIGRAM(S): 100 TABLET, FILM COATED ORAL at 06:21

## 2021-01-06 RX ADMIN — Medication 5 UNIT(S): at 12:53

## 2021-01-06 RX ADMIN — Medication 3 UNIT(S): at 08:14

## 2021-01-06 RX ADMIN — POLYETHYLENE GLYCOL 3350 17 GRAM(S): 17 POWDER, FOR SOLUTION ORAL at 11:14

## 2021-01-06 NOTE — PROGRESS NOTE ADULT - NEGATIVE MUSCULOSKELETAL SYMPTOMS
no muscle cramps/no muscle weakness/no stiffness/no neck pain/no arm pain L/no arm pain R/no back pain/no leg pain L/no leg pain R
no muscle cramps/no muscle weakness/no stiffness/no neck pain/no arm pain L/no arm pain R/no back pain/no leg pain L/no leg pain R

## 2021-01-06 NOTE — PROGRESS NOTE ADULT - NEGATIVE GENERAL SYMPTOMS
no fever/no chills/no sweating/no anorexia/no weight loss/no polyphagia/no polyuria/no polydipsia
no fever/no chills/no sweating/no anorexia/no weight loss/no polyphagia/no polyuria/no polydipsia

## 2021-01-06 NOTE — DISCHARGE NOTE NURSING/CASE MANAGEMENT/SOCIAL WORK - FLU SEASON?
Stacia Manriquez has filled the prescription and the patient's copay is $5.  Declan will contact the patient to schedule delivery.   Yes...

## 2021-01-06 NOTE — PROGRESS NOTE ADULT - SUBJECTIVE AND OBJECTIVE BOX
Interval Events:  Pt in NAD     Allergies    No Known Allergies    Intolerances      Endocrine/Metabolic Medications:  atorvastatin 40 milliGRAM(s) Oral at bedtime  dextrose 40% Gel 15 Gram(s) Oral once  dextrose 50% Injectable 25 Gram(s) IV Push once  glucagon  Injectable 1 milliGRAM(s) IntraMuscular once  insulin glargine Injectable (LANTUS) 10 Unit(s) SubCutaneous at bedtime  insulin lispro (ADMELOG) corrective regimen sliding scale   SubCutaneous Before meals and at bedtime  insulin lispro Injectable (ADMELOG) 5 Unit(s) SubCutaneous three times a day before meals      Vital Signs Last 24 Hrs  T(C): 36.8 (06 Jan 2021 05:50), Max: 36.8 (06 Jan 2021 05:50)  T(F): 98.3 (06 Jan 2021 05:50), Max: 98.3 (06 Jan 2021 05:50)  HR: 75 (06 Jan 2021 05:50) (60 - 75)  BP: 186/50 (06 Jan 2021 05:50) (157/61 - 186/50)  BP(mean): --  RR: 18 (06 Jan 2021 05:50) (17 - 18)  SpO2: 95% (06 Jan 2021 05:50) (95% - 96%)      PHYSICAL EXAM  All physical exam findings normal, except those marked:  General:	Alert, active, cooperative, NAD, well hydrated  .		[] Abnormal:  Neck		Normal: supple, no cervical adenopathy, no palpable thyroid  .		[] Abnormal:  Cardiovascular	Normal: regular rate, normal S1, S2, no murmurs  .		[] Abnormal:  Respiratory	Normal: no chest wall deformity, normal respiratory pattern, CTA B/L  .		[] Abnormal:  Abdominal	Normal: soft, ND, NT, bowel sounds present, no masses, no organomegaly  .		[] Abnormal:  		Normal normal genitalia, testes descended, circumcised/uncircumcised  .		Srinivas stage:			Breast srinivas:  .		Menstrual history:  .		[] Abnormal:  Extremities	Normal: FROM x4  .		[] Abnormal:  Skin		Normal: intact and not indurated, no rash, no acanthosis nigricans  .		[] Abnormal:  Neurologic	Normal: grossly intact  .		[] Abnormal:    LABS        CAPILLARY BLOOD GLUCOSE      POCT Blood Glucose.: 172 mg/dL (06 Jan 2021 08:09)  POCT Blood Glucose.: 215 mg/dL (05 Jan 2021 21:46)  POCT Blood Glucose.: 169 mg/dL (05 Jan 2021 16:52)        Assesment/plan

## 2021-01-06 NOTE — PROGRESS NOTE ADULT - NEGATIVE OPHTHALMOLOGIC SYMPTOMS
no photophobia/no lacrimation L/no lacrimation R/no blurred vision L/no blurred vision R/no discharge L/no discharge R/no pain L/no pain R/no irritation L/no irritation R/no loss of vision L/no loss of vision R/no scleral injection L/no scleral injection R
no photophobia/no lacrimation L/no lacrimation R/no blurred vision L/no blurred vision R/no discharge L/no discharge R/no pain L/no pain R/no irritation L/no irritation R/no loss of vision L/no loss of vision R/no scleral injection L/no scleral injection R

## 2021-01-06 NOTE — PROGRESS NOTE ADULT - ASSESSMENT
1. Iron deficiency anemia  2. No evidence of acute GI blkeeding  3. R/o chronic GI bleeding  4. Constipation    Suggestions:    1. Monitor H/H  2. Transfuse PRBC as needed  3. Protonix daily  4. Avoid NSAID  5. Colonoscopy   6. DVT prophylaxis  1. Iron deficiency anemia  2. No evidence of acute GI blkeeding  3. R/o chronic GI bleeding  4. Constipation    Suggestions:    1. Monitor H/H  2. Transfuse PRBC as needed  3. Protonix daily  4. Avoid NSAID  5. Colonoscopy out patient  6. DVT prophylaxis

## 2021-01-06 NOTE — PROGRESS NOTE ADULT - SUBJECTIVE AND OBJECTIVE BOX
Source of information: EFRAIN DUNCAN, Chart review  Patient language: English  : n/a    HPI:   75 year old Female  with  PMHx of scizophrenia not on any meds,walks with a walker  presented to the ED after fall yesterday 12/29 and generalized weakness. Patient admits that she was going to get the news paper then she slipped and wasnot able to stand and was left on the floor for almost a day until her super found her on floor . Patient reports no LOC, chest pain or shortness of breath.  Patient endorses pain to the hip, denies numbness, focal weakness. diarrhea, constipation or abdominal pain . she was admitted to the hospital 1 year ago for generalized weakness and was discharged on medications for HTN and schizophrenia that was diagnosed at that time but she didnot pick the medications and never went to a doctor since then . The patient vomited once while interviewing her,  (30 Dec 2020 15:34)      This is a Patient is a 75y old  Female who presents with a chief complaint of left hip pain and generalized arm and leg pain. Found to have Rhabdomyolysis.   Pt seen and examined at bedside. Pt laying in bed, reports left hip, and back pain PAIN SCORE:  2-3/10 and tolerable. Pain adequately managed on current pain regimen. Pt describes pain as aching, non- radiating, alleviated by pain medications, exacerbated by movement. Pt tolerating PO diet. Pt denies lethargy, nausea, vomiting, constipation and itchiness. Reports last BM 1/5, liquid. Patient stated goal for pain control: to be able to take deep breaths, get out of bed to chair and ambulate with tolerable pain control. Requesting to speak with case management, will make primary team aware.    PAST MEDICAL & SURGICAL HISTORY:  Schizophrenia    No significant past surgical history        FAMILY HISTORY:  No pertinent family history in first degree relatives        Social History:   [X ]Denies ETOH use, illicit drug use, and smoking     Allergies    NKA    MEDICATIONS  (STANDING):  atorvastatin 40 milliGRAM(s) Oral at bedtime  dextrose 40% Gel 15 Gram(s) Oral once  dextrose 5%. 1000 milliLiter(s) (50 mL/Hr) IV Continuous <Continuous>  dextrose 5%. 1000 milliLiter(s) (100 mL/Hr) IV Continuous <Continuous>  dextrose 50% Injectable 25 Gram(s) IV Push once  enoxaparin Injectable 40 milliGRAM(s) SubCutaneous daily  ergocalciferol 69544 Unit(s) Oral <User Schedule>  gabapentin 200 milliGRAM(s) Oral every 12 hours  glucagon  Injectable 1 milliGRAM(s) IntraMuscular once  insulin glargine Injectable (LANTUS) 10 Unit(s) SubCutaneous at bedtime  insulin lispro (ADMELOG) corrective regimen sliding scale   SubCutaneous Before meals and at bedtime  insulin lispro Injectable (ADMELOG) 5 Unit(s) SubCutaneous three times a day before meals  iohexol 300 mG (iodine)/mL Oral Solution 30 milliLiter(s) Oral once  labetalol 100 milliGRAM(s) Oral two times a day  losartan 25 milliGRAM(s) Oral daily  pantoprazole    Tablet 40 milliGRAM(s) Oral before breakfast  polyethylene glycol 3350 17 Gram(s) Oral daily    MEDICATIONS  (PRN):  bisacodyl 5 milliGRAM(s) Oral every 12 hours PRN Constipation  traMADol 25 milliGRAM(s) Oral every 8 hours PRN Severe Pain (7 - 10)      Vital Signs Last 24 Hrs  T(C): 36.8 (06 Jan 2021 05:50), Max: 36.8 (06 Jan 2021 05:50)  T(F): 98.3 (06 Jan 2021 05:50), Max: 98.3 (06 Jan 2021 05:50)  HR: 75 (06 Jan 2021 05:50) (60 - 75)  BP: 186/50 (06 Jan 2021 05:50) (157/61 - 186/50)  BP(mean): --  RR: 18 (06 Jan 2021 05:50) (17 - 18)  SpO2: 95% (06 Jan 2021 05:50) (95% - 96%)  COVID-19 PCR: NotDetec (04 Jan 2021 10:59)  SARS-CoV-2: NotDetec (30 Dec 2020 15:27)    LABS: Reviewed                          10.4   8.19  )-----------( 298      ( 05 Jan 2021 06:41 )             33.0     01-05    138  |  104  |  14  ----------------------------<  165<H>  3.8   |  29  |  0.58    Ca    8.4      05 Jan 2021 06:41  Phos  2.8     01-05  Mg     1.9     01-05            CAPILLARY BLOOD GLUCOSE      POCT Blood Glucose.: 311 mg/dL (06 Jan 2021 12:43)  POCT Blood Glucose.: 172 mg/dL (06 Jan 2021 08:09)  POCT Blood Glucose.: 215 mg/dL (05 Jan 2021 21:46)  POCT Blood Glucose.: 169 mg/dL (05 Jan 2021 16:52)    COVID-19 PCR: NotDete (04 Jan 2021 10:59)  SARS-CoV-2: NotDete (30 Dec 2020 15:27)        Radiology: Reviewed  < from: CT Abdomen and Pelvis w/ Oral Cont (01.05.21 @ 14:47) >    EXAM:  CT ABDOMEN AND PELVIS OC                            PROCEDURE DATE:  01/05/2021          INTERPRETATION:  CLINICAL INFORMATION: Iron deficiency anemia    COMPARISON: None.    PROCEDURE:  CT of the Abdomen and Pelvis was performed without intravenous contrast.  Intravenous contrast: None.  Oral contrast: positive contrast was administered.  Sagittal and coronal reformats were performed.    FINDINGS: Evaluation of the abdomen and pelvis is limited by lack of IV contrast.  LOWER CHEST: Mildbilateral pleural effusions. Small bilateral atelectasis. Small calcified granuloma in the lingula.    LIVER: Within normal limits.  BILE DUCTS: Normal caliber.  GALLBLADDER: Within normal limits.  SPLEEN: Within normal limits.  PANCREAS: Atrophic with fatty replacement  ADRENALS: Within normal limits.  KIDNEYS/URETERS: Within normal limits.    BLADDER: Within normal limits.  REPRODUCTIVE ORGANS: The uterus appears unremarkable. 1.4 cm cystic lesion in the left adnexa, suggestive of a left ovarian cyst.    BOWEL: No bowel obstruction or grossly thickened bowel wall. No CT evidence for a colonic mass. Appendix within normal limits.  PERITONEUM: No free air or ascites.  VESSELS: Atherosclerotic disease.  RETROPERITONEUM/LYMPH NODES: No lymphadenopathy.  ABDOMINAL WALL: Within normal limits.  BONES: Mild degenerative spondylosis. Grade 1 anterolisthesis at L5-S1.    IMPRESSION:    No bowel obstruction or grossly thickened bowel wall. No CT evidence for a colonic mass. Appendix within normal limits. If clinically indicated, colonoscopy may be pursued for further evaluation.    1.4 cm cystic lesion in the left adnexa, suggestive of a left ovarian cyst.    Mild bilateral pleural effusions.            YESENIA VAZQUEZ MD; Attending Radiologist  This document has been electronically signed. Jan 5 2021  3:41PM    < end of copied text >      ORT Score -   Family Hx of substance abuse	Female	      Male  Alcohol 	                                           1                     3  Illegal drugs	                                   2                     3  Rx drugs                                           4 	                  4  Personal Hx of substance abuse		  Alcohol 	                                          3	                  3  Illegal drugs                                     4	                  4  Rx drugs                                            5 	                  5  Age between 16- 45 years	           1                     1  hx preadolescent sexual abuse	   3 	                  0  Psychological disease		  ADD, OCD, bipolar, schizophrenia   2	          2  Depression                                           1 	          1  Total: 0    a score of 3 or lower indicates low risk for opioid abuse		  a score of 4-7 indicates moderate risk for opioid abuse		  a score of 8 or higher indicates high risk for opioid abuse    REVIEW OF SYSTEMS:  CONSTITUTIONAL: No fever or fatigue  RESPIRATORY: No cough, wheezing, chills or hemoptysis; No shortness of breath  CARDIOVASCULAR: No chest pain, palpitations, dizziness, or leg swelling  GASTROINTESTINAL: No abdominal or epigastric pain. No nausea, vomiting; No diarrhea or constipation.   GENITOURINARY: No dysuria, frequency, hematuria, retention or incontinence  MUSCULOSKELETAL: + left hip pain and generalized arm and leg pain; No swelling; no upper or lower motor strength weakness, no saddle anesthesia, bowel/bladder incontinence, no falls   NEURO: No headaches, No numbness/tingling b/l LE, No weakness  PSYCHIATRIC: + hx schizophrenia; No depression, anxiety, mood swings, or difficulty sleeping    PHYSICAL EXAM:  GENERAL:  Alert & Oriented X3, NAD, Good concentration  CHEST/LUNG: Clear to auscultation bilaterally; No rales, rhonchi, wheezing, or rubs  HEART: Regular rate and rhythm; No murmurs, rubs, or gallops  ABDOMEN: Soft, Nontender, Nondistended; Bowel sounds present  EXTREMITIES:  2+ Peripheral Pulses, No cyanosis, or edema  MUSCULOSKELETAL: + left hip, bilateral arms, bilateral legs tender to palpation; + generalized weakness; moves all extremities equally against gravity; ROM intact; negative SLR  SKIN: + ecchymosis; No rashes or lesions    Risk factors associated with adverse outcomes related to opioid treatment  [ ]  Concurrent benzodiazepine use  [ ]  History/ Active substance use or alcohol use disorder  [ ] Psychiatric co-morbidity  [ ] Sleep apnea  [ ] COPD  [ ] BMI> 35  [ ] Liver dysfunction  [ ] Renal dysfunction  [ ] CHF  [ ] Smoker  [X ]  Age > 60 years    [X ]  [place]  Reviewed and Copied to Chart. See below.    Plan of care and goal oriented pain management treatment options were discussed with patient and /or primary care giver; all questions and concerns were addressed and care was aligned with patient's wishes.    Educated patient on goal oriented pain management treatment options     01-06-21 @ 13:01

## 2021-01-06 NOTE — PROGRESS NOTE ADULT - NEUROLOGICAL DETAILS
responds to pain/responds to verbal commands/sensation intact/cranial nerves intact
responds to pain/sensation intact/cranial nerves intact

## 2021-01-06 NOTE — PROGRESS NOTE ADULT - SUBJECTIVE AND OBJECTIVE BOX
Patient is a 75y old  Female who presents with a chief complaint of Rhabdomyolysis (05 Jan 2021 19:42)    pt seen in tele [  ], reg med floor [ x  ], bed [x  ], chair at bedside [   ], a+o x3 [ x ], lethargic [  ],    nad [x  ]        Allergies    No Known Allergies        Vitals    T(F): 98.3 (01-06-21 @ 05:50), Max: 98.3 (01-06-21 @ 05:50)  HR: 75 (01-06-21 @ 05:50) (60 - 75)  BP: 186/50 (01-06-21 @ 05:50) (157/61 - 186/50)  RR: 18 (01-06-21 @ 05:50) (17 - 18)  SpO2: 95% (01-06-21 @ 05:50) (95% - 96%)  Wt(kg): --  CAPILLARY BLOOD GLUCOSE      POCT Blood Glucose.: 215 mg/dL (05 Jan 2021 21:46)      Labs                          10.4   8.19  )-----------( 298      ( 05 Jan 2021 06:41 )             33.0       01-05    138  |  104  |  14  ----------------------------<  165<H>  3.8   |  29  |  0.58    Ca    8.4      05 Jan 2021 06:41  Phos  2.8     01-05  Mg     1.9     01-05              .Blood Blood-Peripheral  12-31 @ 06:37   No Growth Final  --  --          Radiology Results      Meds    MEDICATIONS  (STANDING):  atorvastatin 40 milliGRAM(s) Oral at bedtime  dextrose 40% Gel 15 Gram(s) Oral once  dextrose 5%. 1000 milliLiter(s) (50 mL/Hr) IV Continuous <Continuous>  dextrose 5%. 1000 milliLiter(s) (100 mL/Hr) IV Continuous <Continuous>  dextrose 50% Injectable 25 Gram(s) IV Push once  enoxaparin Injectable 40 milliGRAM(s) SubCutaneous daily  ergocalciferol 84833 Unit(s) Oral <User Schedule>  gabapentin 200 milliGRAM(s) Oral every 12 hours  glucagon  Injectable 1 milliGRAM(s) IntraMuscular once  insulin glargine Injectable (LANTUS) 10 Unit(s) SubCutaneous at bedtime  insulin lispro (ADMELOG) corrective regimen sliding scale   SubCutaneous Before meals and at bedtime  insulin lispro Injectable (ADMELOG) 3 Unit(s) SubCutaneous three times a day before meals  iohexol 300 mG (iodine)/mL Oral Solution 30 milliLiter(s) Oral once  labetalol 100 milliGRAM(s) Oral two times a day  losartan 25 milliGRAM(s) Oral daily  pantoprazole    Tablet 40 milliGRAM(s) Oral before breakfast  polyethylene glycol 3350 17 Gram(s) Oral daily      MEDICATIONS  (PRN):  bisacodyl 5 milliGRAM(s) Oral every 12 hours PRN Constipation  traMADol 25 milliGRAM(s) Oral every 8 hours PRN Severe Pain (7 - 10)      Physical Exam    Neuro :  no focal deficits  Respiratory: CTA B/L  CV: RRR, S1S2, no murmurs,   Abdominal: Soft, NT, ND +BS,  Extremities: No edema, + peripheral pulses    ASSESSMENT      s/p fall  Rhabdomyolysis   uncontrolled htn  hyperglycemia 2nd to dm  myalgia   hypokalemia   iron def anemia  h/o Schizophrenia        PLAN        trop x 3 noted above  cont aspirin, statin,   cardio f/u   cont labetalol and losartan  echo Ejection Fraction Visual Estimate: >55 %, Mild diastolic dysfunction (stage I) noted.  ck wnl  cont  ivf,   hgba1c 10.6 noted    cont hss  endo f/u   Lantus 10 units hs   Ademlog  3units  TID pre meals with SS coverage.   Diabetic education   nutrition eval  pain mgmt f/u  cont gabapentin 200mg q12  stool occult blood neg  gi f/u  cea neg noted above  no signs of active bleeding   f/u ct abd pelv   phys tx eval noted and rec phys tx 3-5x/week x 4-6 weeks at rehabilitation facility; subacute rehabilitation facility  cont current meds   d/c plan for am pending ct results         Patient is a 75y old  Female who presents with a chief complaint of Rhabdomyolysis (05 Jan 2021 19:42)    pt seen in tele [  ], reg med floor [ x  ], bed [x  ], chair at bedside [   ], a+o x3 [ x ], lethargic [  ],    nad [x  ]        Allergies    No Known Allergies        Vitals    T(F): 98.3 (01-06-21 @ 05:50), Max: 98.3 (01-06-21 @ 05:50)  HR: 75 (01-06-21 @ 05:50) (60 - 75)  BP: 186/50 (01-06-21 @ 05:50) (157/61 - 186/50)  RR: 18 (01-06-21 @ 05:50) (17 - 18)  SpO2: 95% (01-06-21 @ 05:50) (95% - 96%)  Wt(kg): --  CAPILLARY BLOOD GLUCOSE      POCT Blood Glucose.: 215 mg/dL (05 Jan 2021 21:46)      Labs                          10.4   8.19  )-----------( 298      ( 05 Jan 2021 06:41 )             33.0       01-05    138  |  104  |  14  ----------------------------<  165<H>  3.8   |  29  |  0.58    Ca    8.4      05 Jan 2021 06:41  Phos  2.8     01-05  Mg     1.9     01-05              .Blood Blood-Peripheral  12-31 @ 06:37   No Growth Final  --  --          Radiology Results    < from: CT Abdomen and Pelvis w/ Oral Cont (01.05.21 @ 14:47) >  IMPRESSION:    No bowel obstruction or grossly thickened bowel wall. No CT evidence for a colonic mass. Appendix within normal limits. If clinically indicated, colonoscopy may be pursued for further evaluation.    1.4 cm cystic lesion in the left adnexa, suggestive of a left ovarian cyst.    Mild bilateral pleural effusions.      < end of copied text >      Meds    MEDICATIONS  (STANDING):  atorvastatin 40 milliGRAM(s) Oral at bedtime  dextrose 40% Gel 15 Gram(s) Oral once  dextrose 5%. 1000 milliLiter(s) (50 mL/Hr) IV Continuous <Continuous>  dextrose 5%. 1000 milliLiter(s) (100 mL/Hr) IV Continuous <Continuous>  dextrose 50% Injectable 25 Gram(s) IV Push once  enoxaparin Injectable 40 milliGRAM(s) SubCutaneous daily  ergocalciferol 10539 Unit(s) Oral <User Schedule>  gabapentin 200 milliGRAM(s) Oral every 12 hours  glucagon  Injectable 1 milliGRAM(s) IntraMuscular once  insulin glargine Injectable (LANTUS) 10 Unit(s) SubCutaneous at bedtime  insulin lispro (ADMELOG) corrective regimen sliding scale   SubCutaneous Before meals and at bedtime  insulin lispro Injectable (ADMELOG) 3 Unit(s) SubCutaneous three times a day before meals  iohexol 300 mG (iodine)/mL Oral Solution 30 milliLiter(s) Oral once  labetalol 100 milliGRAM(s) Oral two times a day  losartan 25 milliGRAM(s) Oral daily  pantoprazole    Tablet 40 milliGRAM(s) Oral before breakfast  polyethylene glycol 3350 17 Gram(s) Oral daily      MEDICATIONS  (PRN):  bisacodyl 5 milliGRAM(s) Oral every 12 hours PRN Constipation  traMADol 25 milliGRAM(s) Oral every 8 hours PRN Severe Pain (7 - 10)      Physical Exam    Neuro :  no focal deficits  Respiratory: CTA B/L  CV: RRR, S1S2, no murmurs,   Abdominal: Soft, NT, ND +BS,  Extremities: No edema, + peripheral pulses    ASSESSMENT      s/p fall  Rhabdomyolysis   uncontrolled htn  hyperglycemia 2nd to dm  myalgia   hypokalemia   iron def anemia  h/o Schizophrenia        PLAN        trop x 3 noted above  cont aspirin, statin,   cardio f/u   cont labetalol and losartan  echo Ejection Fraction Visual Estimate: >55 %, Mild diastolic dysfunction (stage I) noted.  ck wnl  cont  ivf,   hgba1c 10.6 noted    cont hss  endo f/u   Lantus 10 units hs   Ademlog  3units  TID pre meals with SS coverage.   Diabetic education   nutrition eval  pain mgmt f/u  cont gabapentin 200mg q12  stool occult blood neg  gi f/u  cea neg noted above  no signs of active bleeding   ct abd pelv with No bowel obstruction or grossly thickened bowel wall. No CT evidence for a colonic mass. Appendix within normal limits. If clinically indicated, colonoscopy may be pursued for further evaluation.  1.4 cm cystic lesion in the left adnexa, suggestive of a left ovarian cyst. Mild bilateral pleural effusions noted above.  phys tx eval noted and rec phys tx 3-5x/week x 4-6 weeks at rehabilitation facility; subacute rehabilitation facility  cont current meds   d/c plan for [facility] pending acceptance (covid Neg 1/4/21)

## 2021-01-06 NOTE — PROGRESS NOTE ADULT - NEGATIVE PSYCHIATRIC SYMPTOMS
no suicidal ideation/no depression/no anxiety/no insomnia/no memory loss/no paranoia/no mood swings
no suicidal ideation/no depression/no anxiety/no insomnia/no memory loss/no paranoia/no mood swings

## 2021-01-06 NOTE — PROGRESS NOTE ADULT - NEGATIVE NEUROLOGICAL SYMPTOMS
no syncope/no tremors/no vertigo/no loss of sensation/no headache
no syncope/no tremors/no vertigo/no loss of sensation/no headache

## 2021-01-06 NOTE — PROGRESS NOTE ADULT - PROBLEM SELECTOR PLAN 2
- patient presented with a fall and was left on the floor for a day   - CK 2416   - started on aggressive hydration at 200 cc   - f/u u/s doppler LEs  - hip xray showed no bone pathology   -CT head no acute event  -f/u echo   - f/u PT eval

## 2021-01-06 NOTE — PROGRESS NOTE ADULT - NEGATIVE ENMT SYMPTOMS
no hearing difficulty/no ear pain/no tinnitus/no vertigo/no sinus symptoms/no nasal discharge/no nasal obstruction/no post-nasal discharge/no nose bleeds/no gum bleeding/no dry mouth/no throat pain/no dysphagia
no hearing difficulty/no ear pain/no tinnitus/no vertigo/no sinus symptoms/no nasal discharge/no nasal obstruction/no post-nasal discharge/no nose bleeds/no gum bleeding/no dry mouth/no throat pain/no dysphagia

## 2021-01-06 NOTE — PROGRESS NOTE ADULT - SUBJECTIVE AND OBJECTIVE BOX
[   ] ICU                                          [   ] CCU                                      [ X ] Medical Floor      75 year old Female  with  past medical history significant for HTN and schizophrenia presented post fall and admitted with Rhabdomyolysis found to have iron deficiency anemia.  Patient c/o intermittent constipation and diarrhea.     Today patient appears comfortable with no new complaint. No abdominal pain, N/V, hematemesis, hematochezia, melena, fever, chills, chest pain, SOB, cough or diarrhea reported.        PAIN MANAGEMENT:  Pain Scale:                 0/10  Pain Location:      Prior Colonoscopy:  No prior colonoscopy    PAST MEDICAL HISTORY  Schizophrenia  HTN      PAST SURGICAL HISTORY  No significant past surgical history      Allergies    Allergy Status Unknown    Intolerances  None       SOCIAL HISTORY  Advanced Directives:       [ X ] Full Code       [  ] DNR  Marital Status:         [  ] M      [ X ] S      [  ] D       [  ] W  Children:       [ X ] Yes      [  ] No  Occupation:        [  ] Employed       [ X ] Unemployed       [  ] Retired  Diet:       [ X ] Regular       [  ] PEG feeding          [  ] NG tube feeding  Drug Use:           [ X ] Patient denied          [  ] Yes  Alcohol:           [ X ] No             [  ] Yes (socially)         [  ] Yes (chronic)  Tobacco:           [  ] Yes           [ X ] No      FAMILY HISTORY  [X  ] Heart Disease            [ X ] Diabetes             [ X ] HTN             [  ] Colon Cancer             [  ] Stomach Cancer              [  ] Pancreatic Cancer        VITALS  Vital Signs Last 24 Hrs  T(C): 36.8 (06 Jan 2021 05:50), Max: 36.8 (06 Jan 2021 05:50)  T(F): 98.3 (06 Jan 2021 05:50), Max: 98.3 (06 Jan 2021 05:50)  HR: 75 (06 Jan 2021 05:50) (60 - 75)  BP: 186/50 (06 Jan 2021 05:50) (157/61 - 186/50)   RR: 18 (06 Jan 2021 05:50) (17 - 18)  SpO2: 95% (06 Jan 2021 05:50) (95% - 96%)         MEDICATIONS  (STANDING):  atorvastatin 40 milliGRAM(s) Oral at bedtime  dextrose 40% Gel 15 Gram(s) Oral once  dextrose 5%. 1000 milliLiter(s) (50 mL/Hr) IV Continuous <Continuous>  dextrose 5%. 1000 milliLiter(s) (100 mL/Hr) IV Continuous <Continuous>  dextrose 50% Injectable 25 Gram(s) IV Push once  enoxaparin Injectable 40 milliGRAM(s) SubCutaneous daily  ergocalciferol 82205 Unit(s) Oral <User Schedule>  gabapentin 200 milliGRAM(s) Oral every 12 hours  glucagon  Injectable 1 milliGRAM(s) IntraMuscular once  insulin glargine Injectable (LANTUS) 10 Unit(s) SubCutaneous at bedtime  insulin lispro (ADMELOG) corrective regimen sliding scale   SubCutaneous Before meals and at bedtime  insulin lispro Injectable (ADMELOG) 5 Unit(s) SubCutaneous three times a day before meals  iohexol 300 mG (iodine)/mL Oral Solution 30 milliLiter(s) Oral once  labetalol 100 milliGRAM(s) Oral two times a day  losartan 25 milliGRAM(s) Oral daily  pantoprazole    Tablet 40 milliGRAM(s) Oral before breakfast  polyethylene glycol 3350 17 Gram(s) Oral daily    MEDICATIONS  (PRN):  bisacodyl 5 milliGRAM(s) Oral every 12 hours PRN Constipation  traMADol 25 milliGRAM(s) Oral every 8 hours PRN Severe Pain (7 - 10)                            10.4   8.19  )-----------( 298      ( 05 Jan 2021 06:41 )             33.0       01-05    138  |  104  |  14  ----------------------------<  165<H>  3.8   |  29  |  0.58    Ca    8.4      05 Jan 2021 06:41  Phos  2.8     01-05  Mg     1.9     01-05

## 2021-01-06 NOTE — PROGRESS NOTE ADULT - SUBJECTIVE AND OBJECTIVE BOX
NP Note discussed with  primary attending    Patient is a 75y old  Female who presents with a chief complaint of Rhabdomyolysis (06 Jan 2021 06:39)      INTERVAL HPI/OVERNIGHT EVENTS: no new complaints    MEDICATIONS  (STANDING):  atorvastatin 40 milliGRAM(s) Oral at bedtime  dextrose 40% Gel 15 Gram(s) Oral once  dextrose 5%. 1000 milliLiter(s) (50 mL/Hr) IV Continuous <Continuous>  dextrose 5%. 1000 milliLiter(s) (100 mL/Hr) IV Continuous <Continuous>  dextrose 50% Injectable 25 Gram(s) IV Push once  enoxaparin Injectable 40 milliGRAM(s) SubCutaneous daily  ergocalciferol 24366 Unit(s) Oral <User Schedule>  gabapentin 200 milliGRAM(s) Oral every 12 hours  glucagon  Injectable 1 milliGRAM(s) IntraMuscular once  insulin glargine Injectable (LANTUS) 10 Unit(s) SubCutaneous at bedtime  insulin lispro (ADMELOG) corrective regimen sliding scale   SubCutaneous Before meals and at bedtime  insulin lispro Injectable (ADMELOG) 5 Unit(s) SubCutaneous three times a day before meals  iohexol 300 mG (iodine)/mL Oral Solution 30 milliLiter(s) Oral once  labetalol 100 milliGRAM(s) Oral two times a day  losartan 25 milliGRAM(s) Oral daily  pantoprazole    Tablet 40 milliGRAM(s) Oral before breakfast  polyethylene glycol 3350 17 Gram(s) Oral daily    MEDICATIONS  (PRN):  bisacodyl 5 milliGRAM(s) Oral every 12 hours PRN Constipation  traMADol 25 milliGRAM(s) Oral every 8 hours PRN Severe Pain (7 - 10)      __________________________________________________  REVIEW OF SYSTEMS:    CONSTITUTIONAL: No fever,   NECK: No pain or stiffness  RESPIRATORY: No cough; No shortness of breath  CARDIOVASCULAR: No chest pain, no palpitations  GASTROINTESTINAL: No pain. No nausea or vomiting; No diarrhea   NEUROLOGICAL: No headache or numbness, no tremors  MUSCULOSKELETAL: No joint pain, no muscle pain  PSYCHIATRY: no depression , no anxiety  ALL OTHER  ROS negative        Vital Signs Last 24 Hrs  T(C): 36.8 (06 Jan 2021 05:50), Max: 36.8 (06 Jan 2021 05:50)  T(F): 98.3 (06 Jan 2021 05:50), Max: 98.3 (06 Jan 2021 05:50)  HR: 75 (06 Jan 2021 05:50) (60 - 75)  BP: 186/50 (06 Jan 2021 05:50) (157/61 - 186/50)  BP(mean): --  RR: 18 (06 Jan 2021 05:50) (17 - 18)  SpO2: 95% (06 Jan 2021 05:50) (95% - 96%)    ________________________________________________  PHYSICAL EXAM:  GENERAL: NAD  HEENT: Normocephalic;  conjunctivae and sclerae clear; moist mucous membranes;   CHEST/LUNG: Clear to ausculitation bilaterally with good air entry   HEART: S1 S2  regular; no murmurs, gallops or rubs  ABDOMEN: Soft, Nontender, Nondistended; Bowel sounds present  EXTREMITIES: no cyanosis; no edema; no calf tenderness  SKIN: warm and dry; no rash  NERVOUS SYSTEM:  Awake and alert; Oriented  to place, person and time ; no new deficits    _________________________________________________  LABS:                        10.4   8.19  )-----------( 298      ( 05 Jan 2021 06:41 )             33.0     01-05    138  |  104  |  14  ----------------------------<  165<H>  3.8   |  29  |  0.58    Ca    8.4      05 Jan 2021 06:41  Phos  2.8     01-05  Mg     1.9     01-05          CAPILLARY BLOOD GLUCOSE      POCT Blood Glucose.: 172 mg/dL (06 Jan 2021 08:09)  POCT Blood Glucose.: 215 mg/dL (05 Jan 2021 21:46)  POCT Blood Glucose.: 169 mg/dL (05 Jan 2021 16:52)  POCT Blood Glucose.: 217 mg/dL (05 Jan 2021 12:13)        RADIOLOGY & ADDITIONAL TESTS:    Imaging Personally Reviewed:  YES/NO    Consultant(s) Notes Reviewed:   YES/ No    Care Discussed with Consultants :     Plan of care was discussed with patient and /or primary care giver; all questions and concerns were addressed and care was aligned with patient's wishes.

## 2021-01-06 NOTE — PROGRESS NOTE ADULT - SUBJECTIVE AND OBJECTIVE BOX
CHIEF COMPLAINT:Patient is a 75y old  Female who presents with a chief complaint of Rhabdomyolysis.Pt appears comfortable.    	  REVIEW OF SYSTEMS:  CONSTITUTIONAL: No fever, weight loss, or fatigue  EYES: No eye pain, visual disturbances, or discharge  ENT:  No difficulty hearing, tinnitus, vertigo; No sinus or throat pain  NECK: No pain or stiffness  RESPIRATORY: No cough, wheezing, chills or hemoptysis; No Shortness of Breath  CARDIOVASCULAR: No chest pain, palpitations, passing out, dizziness, or leg swelling  GASTROINTESTINAL: No abdominal or epigastric pain. No nausea, vomiting, or hematemesis; No diarrhea or constipation. No melena or hematochezia.  GENITOURINARY: No dysuria, frequency, hematuria, or incontinence  NEUROLOGICAL: No headaches, memory loss, loss of strength, numbness, or tremors  SKIN: No itching, burning, rashes, or lesions   LYMPH Nodes: No enlarged glands  ENDOCRINE: No heat or cold intolerance; No hair loss  MUSCULOSKELETAL: No joint pain or swelling; No muscle, back, or extremity pain  PSYCHIATRIC: No depression, anxiety, mood swings, or difficulty sleeping  HEME/LYMPH: No easy bruising, or bleeding gums  ALLERGY AND IMMUNOLOGIC: No hives or eczema	      PHYSICAL EXAM:  T(C): 36.8 (01-06-21 @ 05:50), Max: 36.8 (01-06-21 @ 05:50)  HR: 75 (01-06-21 @ 05:50) (60 - 75)  BP: 186/50 (01-06-21 @ 05:50) (157/61 - 186/50)  RR: 18 (01-06-21 @ 05:50) (17 - 18)  SpO2: 95% (01-06-21 @ 05:50) (95% - 96%)  Wt(kg): --  I&O's Summary      Appearance: Normal	  HEENT:   Normal oral mucosa, PERRL, EOMI	  Lymphatic: No lymphadenopathy  Cardiovascular: Normal S1 S2, No JVD, No murmurs, No edema  Respiratory: Lungs clear to auscultation	  Psychiatry: A & O x 3, Mood & affect appropriate  Gastrointestinal:  Soft, Non-tender, + BS	  Skin: No rashes, No ecchymoses, No cyanosis	  Neurologic: Non-focal  Extremities: Normal range of motion, No clubbing, cyanosis or edema  Vascular: Peripheral pulses palpable 2+ bilaterally    MEDICATIONS  (STANDING):  atorvastatin 40 milliGRAM(s) Oral at bedtime  dextrose 40% Gel 15 Gram(s) Oral once  dextrose 5%. 1000 milliLiter(s) (50 mL/Hr) IV Continuous <Continuous>  dextrose 5%. 1000 milliLiter(s) (100 mL/Hr) IV Continuous <Continuous>  dextrose 50% Injectable 25 Gram(s) IV Push once  enoxaparin Injectable 40 milliGRAM(s) SubCutaneous daily  ergocalciferol 77607 Unit(s) Oral <User Schedule>  gabapentin 200 milliGRAM(s) Oral every 12 hours  glucagon  Injectable 1 milliGRAM(s) IntraMuscular once  insulin glargine Injectable (LANTUS) 10 Unit(s) SubCutaneous at bedtime  insulin lispro (ADMELOG) corrective regimen sliding scale   SubCutaneous Before meals and at bedtime  insulin lispro Injectable (ADMELOG) 5 Unit(s) SubCutaneous three times a day before meals  iohexol 300 mG (iodine)/mL Oral Solution 30 milliLiter(s) Oral once  labetalol 100 milliGRAM(s) Oral two times a day  losartan 25 milliGRAM(s) Oral daily  pantoprazole    Tablet 40 milliGRAM(s) Oral before breakfast  polyethylene glycol 3350 17 Gram(s) Oral daily      LABS:	 	                         10.4   8.19  )-----------( 298      ( 05 Jan 2021 06:41 )             33.0     01-05    138  |  104  |  14  ----------------------------<  165<H>  3.8   |  29  |  0.58    Ca    8.4      05 Jan 2021 06:41  Phos  2.8     01-05  Mg     1.9     01-05      proBNP: Serum Pro-Brain Natriuretic Peptide: 3429 pg/mL (12-30 @ 19:30)    Lipid Profile: Cholesterol 256  LDL --  HDL 41    Cholesterol 255  LDL --  HDL 47      HgA1c:   TSH: Thyroid Stimulating Hormone, Serum: 1.44 uU/mL (12-31 @ 06:47)  Thyroid Stimulating Hormone, Serum: 1.20 uU/mL (12-30 @ 19:30)      	      t< from: CT Abdomen and Pelvis w/ Oral Cont (01.05.21 @ 14:47) >  EXAM:  CT ABDOMEN AND PELVIS OC                            PROCEDURE DATE:  01/05/2021          INTERPRETATION:  CLINICAL INFORMATION: Iron deficiency anemia    COMPARISON: None.    PROCEDURE:  CT of the Abdomen and Pelvis was performed without intravenous contrast.  Intravenous contrast: None.  Oral contrast: positive contrast was administered.  Sagittal and coronal reformats were performed.    FINDINGS: Evaluation of the abdomen and pelvis is limited by lack of IV contrast.  LOWER CHEST: Mildbilateral pleural effusions. Small bilateral atelectasis. Small calcified granuloma in the lingula.    LIVER: Within normal limits.  BILE DUCTS: Normal caliber.  GALLBLADDER: Within normal limits.  SPLEEN: Within normal limits.  PANCREAS: Atrophic with fatty replacement  ADRENALS: Within normal limits.  KIDNEYS/URETERS: Within normal limits.    BLADDER: Within normal limits.  REPRODUCTIVE ORGANS: The uterus appears unremarkable. 1.4 cm cystic lesion in the left adnexa, suggestive of a left ovarian cyst.    BOWEL: No bowel obstruction or grossly thickened bowel wall. No CT evidence for a colonic mass. Appendix within normal limits.  PERITONEUM: No free air or ascites.  VESSELS: Atherosclerotic disease.  RETROPERITONEUM/LYMPH NODES: No lymphadenopathy.  ABDOMINAL WALL: Within normal limits.  BONES: Mild degenerative spondylosis. Grade 1 anterolisthesis at L5-S1.    IMPRESSION:    No bowel obstruction or grossly thickened bowel wall. No CT evidence for a colonic mass. Appendix within normal limits. If clinically indicated, colonoscopy may be pursued for further evaluation.    1.4 cm cystic lesion in the left adnexa, suggestive of a left ovarian cyst.    Mild bilateral pleural effusions.

## 2021-01-06 NOTE — PROGRESS NOTE ADULT - PROVIDER SPECIALTY LIST ADULT
Cardiology
Endocrinology
Internal Medicine
Cardiology
Cardiology
Endocrinology
Endocrinology
Internal Medicine
Internal Medicine
Pain Medicine
Cardiology
Endocrinology
Internal Medicine
Cardiology
Pain Medicine
Cardiology
Pain Medicine
Pain Medicine
Gastroenterology
Internal Medicine
Gastroenterology

## 2021-01-06 NOTE — PROGRESS NOTE ADULT - REASON FOR ADMISSION
Rhabdomyolysis

## 2021-01-06 NOTE — PROGRESS NOTE ADULT - ASSESSMENT
75 year old Female  with  PMHx of schizophrenia not on any meds,walks with a walker  presented to the ED after fall on 12/29 and generalized weakness,new onset Dm,uncontrolled HTN and abnormal EKG.  1.HTN-cont labetalol 100mg bid and cozaar 25mg qd.  2.DM-Insulin,Endo f/u.  3.Fe def anemia-GI f/u.  4.Lipid d/o-statin.  5.GI and DVT prophylaxis.

## 2021-01-06 NOTE — DISCHARGE NOTE NURSING/CASE MANAGEMENT/SOCIAL WORK - PATIENT PORTAL LINK FT
You can access the FollowMyHealth Patient Portal offered by [facility] by registering at the following website: [email or web address]. By joining [facility]’s FollowMyHealth portal, you will also be able to view your health information using other applications (apps) compatible with our system.

## 2021-01-06 NOTE — PROGRESS NOTE ADULT - RS GEN PE MLT RESP DETAILS PC
breath sounds equal/good air movement/respirations non-labored/no rales/no rhonchi/no wheezes
airway patent/breath sounds equal/respirations non-labored/clear to auscultation bilaterally/no rales/no rhonchi

## 2021-01-06 NOTE — PROGRESS NOTE ADULT - ASSESSMENT
75 year old Female  with  PMHx of scizophrenia not on any meds,walks with a walker  presented to the ED after fall yesterday 12/29 and generalized weakness.    Endo is consulted for new diagnosis of DM.     1. New diagnosis of DM   A1c: 10.8  now better controlled   Continue Lantus 10 units hs   increase Ademlog to 5units  TID pre meals with SS coverage.   Diabetic education   nutrition eval  - DC plan: discharge on insulin, same regimen as above. OP followup to eventually start on oral hypoglycemics.   d/w hs     Anemia- w/u per prim team  Gi eval noted.  75 year old Female  with  PMHx of scizophrenia not on any meds,walks with a walker  presented to the ED after fall yesterday 12/29 and generalized weakness.    Endo is consulted for new diagnosis of DM.     1. New diagnosis of DM   A1c: 10.8  now better controlled   Continue Lantus 10 units hs   increase Ademlog to 5units  TID pre meals with SS coverage.   Diabetic education   nutrition eval  - DC plan: discharge on insulin, same regimen as above. OP followup to eventually start on oral hypoglycemics.   d/w hs.     Anemia- w/u per prim team  Gi eval noted.

## 2021-01-06 NOTE — PROGRESS NOTE ADULT - NEGATIVE GASTROINTESTINAL SYMPTOMS
no nausea/no vomiting/no diarrhea/no change in bowel habits/no abdominal pain/no melena/no hematochezia/no steatorrhea/no jaundice/no hiccoughs
no nausea/no vomiting/no diarrhea/no change in bowel habits/no abdominal pain/no melena/no hematochezia/no steatorrhea/no jaundice/no hiccoughs

## 2021-04-22 NOTE — CONSULT NOTE ADULT - MOUTH
[Midline] : trachea located in midline position [Normal] : no rashes [de-identified] : R TOP lesion persists normal mouth and gums/moist

## 2021-12-28 NOTE — DISCHARGE NOTE PROVIDER - NSDCHC_MEDRECSTATUS_GEN_ALL_CORE
Admission Reconciliation is Not Complete  Discharge Reconciliation is Not Complete Admission Reconciliation is Completed  Discharge Reconciliation is Completed 1.88

## 2022-01-01 NOTE — CONSULT NOTE ADULT - NEGATIVE GASTROINTESTINAL SYMPTOMS
no nausea/no vomiting/no diarrhea/no change in bowel habits/no abdominal pain/no melena/no hematochezia/no steatorrhea/no jaundice/no hiccoughs No pallor, no cervical/supraclavicular/inguinal adenopathy.  No splenomegaly

## 2022-04-18 NOTE — PROGRESS NOTE ADULT - GENERAL
Infectious Disease Consultation Note        Reason: evaluate for cystitis    Current abx Prior abx   Nitrofurantoin since 4/17/2022      Lines:       Assessment :  67-year-old female with a past medical history significant for hypertension, prior seizures, history of CVA who was brought in by her family on 4/11/2022 following a witnessed seizure. Now with worsening lethargy x 2 days, fever with T-max 100.2 on 4/17, dysuria, worsening renal function, significant pyuria, abdominal pain/nausea    Clinical presentation consistent with cystitis-rule out complicated urinary tract infection, pyelonephritis/hydronephrosis    Diffuse abdominal pain/tenderness, nausea-could be secondary to undiagnosed pyelonephritis. Rule out undiagnosed abdominal pathology. No evidence of hydronephrosis/pyelonephritis noted on recent CT scan 4/2/2022    Antibiotic management complicated due to documented history of allergy to penicillin, ciprofloxacin-nonlife-threatening allergy to penicillin. Hence will use cephalosporins with close monitoring    Acute kidney injury-rule out obstructive uropathy, complicated UTI    Altered mental status-likely metabolic encephalopathy. Monitor for recurrent seizures    Recommendations:    1. Discontinue nitrofurantoin. Start ceftriaxone  2. Follow-up urine cultures and modify antibiotics accordingly  3. Obtain CT abdomen/pelvis to evaluate for obstructive uropathy, undiagnosed intra-abdominal pathology  4. Add on LFTs to today's labs  5. Management of acute kidney injury per primary team  6. Further recommendations based on the above test results, clinical course    Thank you for consultation request. Above plan was discussed in details with RN and dr Joelle Conner. Please call me if any further questions or concerns. Will continue to participate in the care of this patient.   HPI:    67-year-old female with a past medical history significant for hypertension, prior seizures, history of CVA who was
brought in by her family on 4/11/2022 following a witnessed seizure. She has a known seizure diagnosis and family gave her a dose of her medication and her seizure had stopped. Apparently patient did not remember the events which led to her hospitalization. She complained of some abdominal discomfort on admission per admitting hospitalist note. In the emergency room she was also noted to have elevated troponin. Cardiology and neurology were consulted. Seizure medications were adjusted. She was in the process of being discharged to [facility]. However yesterday she had temperature of 100.2. She also complained of dysuria. Urine analysis was sent. Urine analysis revealed significant pyuria. Patient also complained of abdominal pain, nausea overnight. I have been consulted for further recommendations.     Review of records reveals that she had abdominal CT scan on 4/2/2022 which did not reveal any evidence of hydronephrosis. Moderate stool burden was noted on the CT scan. She was admitted to [facility] in January for failure to thrive as well as KEYLA at which time she had a PEG tube placed and tube feeds were started.       Currently patient is lethargic and does not answer questions asked. Unable to obtain detailed review of systems. As per nursing staff patient was able to answer questions appropriately couple days ago.   Has been noted to have worsening lethargy over the weekend.            Past Medical History:   Diagnosis Date    Abnormal coordination 9/21/2020    Depression 4/12/2022    Failure to thrive in adult 4/12/2022    Hallucinations 9/21/2020    Hypertension     Neurological disorder     Seizures ([address]     Stroke [facility]) 2009    Subclinical hypothyroidism 4/13/2022       Past Surgical History:   Procedure Laterality Date    PLACE PERCUT GASTROSTOMY TUBE  1/11/2022            Current Discharge Medication List      CONTINUE these medications which have NOT
CHANGED    Details   ferrous sulfate 325 mg (65 mg iron) tablet Take 325 mg by mouth daily. divalproex DR (DEPAKOTE) 250 mg tablet Take 250 mg by mouth three (3) times daily. amLODIPine (NORVASC) 10 mg tablet Take 10 mg by mouth daily. spironolactone (ALDACTONE) 25 mg tablet Take 25 mg by mouth daily. hydrALAZINE (APRESOLINE) 100 mg tablet Take 100 mg by mouth two (2) times a day. tamsulosin HCl (TAMSULOSIN PO) Take 0.4 mg by mouth daily. tab      PARoxetine (PAXIL) 30 mg tablet Take 30 mg by mouth daily. OXcarbazepine (TRILEPTAL) 300 mg tablet Take 300 mg by mouth two (2) times a day. multivitamin, tx-iron-ca-min (THERA-M w/ IRON) 9 mg iron-400 mcg tab tablet Take 1 Tablet by mouth daily. Qty: 30 Tablet, Refills: 0      atorvastatin (LIPITOR) 40 mg tablet Take 40 mg by mouth daily. acetaminophen (TYLENOL) 500 mg tablet Take 500 mg by mouth every six (6) hours as needed for Pain. take 1 tab every 6 hours as needed daily for pain      clopidogreL (PLAVIX) 75 mg tab Take 75 mg by mouth daily. Indications: prevention for a blood clot going to the brain      ergocalciferol (VITAMIN D2) 50,000 unit capsule Take 50,000 Units by mouth two (2) times a week. cyanocobalamin (VITAMIN B12) 500 mcg tablet Take 1 Tab by mouth daily.   Qty: 30 Tab, Refills: 0             Current Facility-Administered Medications   Medication Dose Route Frequency    [START ON 4/19/2022] heparin (porcine) injection 5,000 Units  5,000 Units SubCUTAneous Q8H    [Held by provider] nitrofurantoin (macrocrystal-monohydrate) (MACROBID) capsule 100 mg  100 mg Oral BID    amLODIPine (NORVASC) tablet 10 mg  10 mg Oral DAILY    atorvastatin (LIPITOR) tablet 40 mg  40 mg Oral DAILY    clopidogreL (PLAVIX) tablet 75 mg  75 mg Oral DAILY    PARoxetine (PAXIL) tablet 30 mg  30 mg Oral DAILY    multivitamin, tx-iron-ca-min (THERA-M w/ IRON) tablet 1 Tablet  1 Tablet Oral DAILY    ferrous sulfate tablet 325 mg
325 mg Oral DAILY    ergocalciferol capsule 50,000 Units  50,000 Units Oral 2 TIMES WEEKLY    sodium chloride (NS) flush 5-40 mL  5-40 mL IntraVENous Q8H    sodium chloride (NS) flush 5-40 mL  5-40 mL IntraVENous PRN    acetaminophen (TYLENOL) tablet 650 mg  650 mg Oral Q6H PRN    Or    acetaminophen (TYLENOL) suppository 650 mg  650 mg Rectal Q6H PRN    polyethylene glycol (MIRALAX) packet 17 g  17 g Oral DAILY PRN    ondansetron (ZOFRAN ODT) tablet 4 mg  4 mg Oral Q8H PRN    Or    ondansetron (ZOFRAN) injection 4 mg  4 mg IntraVENous Q6H PRN    OXcarbazepine (TRILEPTAL) tablet 600 mg  600 mg Oral BID    divalproex DR (DEPAKOTE) tablet 250 mg  250 mg Oral Q8H       Allergies: Tomato, Aspirin, Ciprofloxacin, Pcn [penicillins], and Sulfa (sulfonamide antibiotics)    Family History   Problem Relation Age of Onset    Diabetes Other     Stroke Other      Social History     Socioeconomic History    Marital status:      Spouse name: Not on file    Number of children: Not on file    Years of education: Not on file    Highest education level: Not on file   Occupational History    Not on file   Tobacco Use    Smoking status: Never Smoker    Smokeless tobacco: Never Used   Vaping Use    Vaping Use: Never used   Substance and Sexual Activity    Alcohol use: Yes     Comment: Socially     Drug use: No    Sexual activity: Not on file   Other Topics Concern    Not on file   Social History Narrative    Not on file     Social Determinants of Health     Financial Resource Strain:     Difficulty of Paying Living Expenses: Not on file   Food Insecurity:     Worried About Running Out of Food in the Last Year: Not on file    Binu of Food in the Last Year: Not on file   Transportation Needs:     Lack of Transportation (Medical): Not on file    Lack of Transportation (Non-Medical):  Not on file   Physical Activity:     Days of Exercise per Week: Not on file    Minutes of Exercise per Session: Not on
details…
file   Stress:     Feeling of Stress : Not on file   Social Connections:     Frequency of Communication with Friends and Family: Not on file    Frequency of Social Gatherings with Friends and Family: Not on file    Attends Jewish Services: Not on file    Active Member of Clubs or Organizations: Not on file    Attends Club or Organization Meetings: Not on file    Marital Status: Not on file   Intimate Partner Violence:     Fear of Current or Ex-Partner: Not on file    Emotionally Abused: Not on file    Physically Abused: Not on file    Sexually Abused: Not on file   Housing Stability:     Unable to Pay for Housing in the Last Year: Not on file    Number of [place] in the Last Year: Not on file    Unstable Housing in the Last Year: Not on file     Social History     Tobacco Use   Smoking Status Never Smoker   Smokeless Tobacco Never Used        Temp (24hrs), Av.7 °F (37.1 °C), Min:98.1 °F (36.7 °C), Max:100.2 °F (37.9 °C)    Visit Vitals  /76 (BP 1 Location: Left upper arm, BP Patient Position: At rest)   Pulse 83   Temp 98.1 °F (36.7 °C)   Resp 18   Ht 5' (1.524 m)   Wt 43.3 kg (95 lb 8 oz)   SpO2 97%   Breastfeeding No   BMI 18.65 kg/m²       ROS: 12 point ROS obtained in details. Pertinent positives as mentioned in HPI,   otherwise negative    Physical Exam:    General: Female patient sitting on bed, moans, in no apparent distress, lethargic    General:   awake alert and oriented   HEENT:  Normocephalic, atraumatic,  EOMI, no scleral icterus or pallor; no conjunctival hemmohage;  Neck supple, no bruits. Lymph Nodes:   not examined   Lungs:   non-labored, bilaterally clear to auscultation- no crackles wheezes rales or rhonchi   Heart:  RRR, s1 and s2; no rubs or gallops, no edema   Abdomen:  soft, non-distended, diffuse tenderness, no guarding/rigidity, no hepatomegaly, no splenomegaly.     Genitourinary:  deferred   Extremities:   no clubbing, cyanosis; no joint effusions or
swelling; muscle mass appropriate for age   Neurologic:   Lethargic, does not follow commands. Etiologic evaluation not feasible                        Skin:  No rash or ulcers noted   Back:  no spinal or paraspinal muscle tenderness or rigidity, no CVA tenderness     Psychiatric:   Unable to assess         Labs: Results:   Chemistry Recent Labs     04/18/22  0400 04/17/22  0416 04/16/22  0110   GLU 97 81 86   * 137 139   K 4.3 3.9 4.2    103 104   CO2 29 28 28   BUN 36* 23* 26*   CREA 1.64* 1.04 0.99   CA 9.5 9.8 9.6   AGAP 5 6 7   BUCR 22* 22* 26*      CBC w/Diff Recent Labs     04/16/22  0110   WBC 9.0   RBC 3.24*   HGB 9.9*   HCT 30.3*         Microbiology No results for input(s): CULT in the last 72 hours. RADIOLOGY:    All available imaging studies/reports in [facility] for this admission were reviewed      Disclaimer: Sections of this note are dictated utilizing voice recognition software, which may have resulted in some phonetic based errors in grammar and contents. Even though attempts were made to correct all the mistakes, some may have been missed, and remained in the body of the document. If questions arise, please contact our department.     Dr. Crista Mehta, Infectious Disease Specialist  [phone]  April 18, 2022  2:23 PM
details…

## 2022-06-03 NOTE — H&P ADULT - PROBLEM SELECTOR PLAN 3
Pt takes risperidone at home  Continue home medications (4) rarely moist Sodium level was 127 at ED  Continue IV normal saline  Monitor BMP at am

## 2022-07-28 NOTE — PROGRESS NOTE BEHAVIORAL HEALTH - NS ED BHA MSE GENERAL APPEARANCE
Quality 130: Documentation Of Current Medications In The Medical Record: Current Medications Documented Quality 431: Preventive Care And Screening: Unhealthy Alcohol Use - Screening: Patient not identified as an unhealthy alcohol user when screened for unhealthy alcohol use using a systematic screening method Quality 110: Preventive Care And Screening: Influenza Immunization: Influenza immunization was not ordered or administered, reason not given Detail Level: Detailed Quality 226: Preventive Care And Screening: Tobacco Use: Screening And Cessation Intervention: Patient screened for tobacco use and is an ex/non-smoker Well developed

## 2023-02-09 NOTE — ED ADULT TRIAGE NOTE - BMI (KG/M2)
Refill request received for montelukast and fluoxetine  Last office visit: 12/6/2022  Next office visit: 6/7/2023  Last refill: 8/18/2022  Last labs:      21

## 2023-08-02 PROBLEM — Z00.00 ENCOUNTER FOR PREVENTIVE HEALTH EXAMINATION: Status: ACTIVE | Noted: 2023-08-02

## 2023-08-04 ENCOUNTER — APPOINTMENT (OUTPATIENT)
Dept: VASCULAR SURGERY | Facility: CLINIC | Age: 78
End: 2023-08-04
Payer: MEDICARE

## 2023-08-04 VITALS
BODY MASS INDEX: 27.97 KG/M2 | WEIGHT: 152 LBS | HEIGHT: 62 IN | DIASTOLIC BLOOD PRESSURE: 72 MMHG | TEMPERATURE: 97.8 F | HEART RATE: 66 BPM | SYSTOLIC BLOOD PRESSURE: 126 MMHG

## 2023-08-04 VITALS — DIASTOLIC BLOOD PRESSURE: 74 MMHG | HEART RATE: 69 BPM | SYSTOLIC BLOOD PRESSURE: 130 MMHG

## 2023-08-04 DIAGNOSIS — Z87.19 PERSONAL HISTORY OF OTHER DISEASES OF THE DIGESTIVE SYSTEM: ICD-10-CM

## 2023-08-04 DIAGNOSIS — Z86.39 PERSONAL HISTORY OF OTHER ENDOCRINE, NUTRITIONAL AND METABOLIC DISEASE: ICD-10-CM

## 2023-08-04 DIAGNOSIS — Z86.79 PERSONAL HISTORY OF OTHER DISEASES OF THE CIRCULATORY SYSTEM: ICD-10-CM

## 2023-08-04 DIAGNOSIS — E53.8 DEFICIENCY OF OTHER SPECIFIED B GROUP VITAMINS: ICD-10-CM

## 2023-08-04 PROCEDURE — 99213 OFFICE O/P EST LOW 20 MIN: CPT

## 2023-08-04 PROCEDURE — 93970 EXTREMITY STUDY: CPT

## 2023-08-04 PROCEDURE — 93923 UPR/LXTR ART STDY 3+ LVLS: CPT

## 2023-08-04 RX ORDER — LOSARTAN POTASSIUM 50 MG/1
50 TABLET, FILM COATED ORAL
Refills: 0 | Status: ACTIVE | COMMUNITY

## 2023-08-04 RX ORDER — ATORVASTATIN CALCIUM 40 MG/1
40 TABLET, FILM COATED ORAL
Refills: 0 | Status: ACTIVE | COMMUNITY

## 2023-08-04 RX ORDER — SITAGLIPTIN 100 MG/1
TABLET, FILM COATED ORAL
Refills: 0 | Status: ACTIVE | COMMUNITY

## 2023-08-04 RX ORDER — GLIPIZIDE 2.5 MG/1
TABLET ORAL
Refills: 0 | Status: ACTIVE | COMMUNITY

## 2023-08-04 RX ORDER — HYDROCORTISONE 1 %
12 CREAM (GRAM) TOPICAL
Refills: 0 | Status: ACTIVE | COMMUNITY

## 2023-08-04 RX ORDER — AMLODIPINE BESYLATE 10 MG/1
10 TABLET ORAL
Refills: 0 | Status: ACTIVE | COMMUNITY

## 2023-11-08 NOTE — PROGRESS NOTE ADULT - PROBLEM SELECTOR PLAN 1
Pt presented with generalized weakness  CT head did not show acute infarct or hemorrhage  PT recommends DAVID No (0)

## 2024-02-09 ENCOUNTER — APPOINTMENT (OUTPATIENT)
Dept: VASCULAR SURGERY | Facility: CLINIC | Age: 79
End: 2024-02-09

## 2024-02-23 ENCOUNTER — INPATIENT (INPATIENT)
Facility: HOSPITAL | Age: 79
LOS: 3 days | Discharge: NURSING, REHAB OR LONG-TERM CARE | DRG: 872 | End: 2024-02-27
Attending: INTERNAL MEDICINE | Admitting: INTERNAL MEDICINE
Payer: MEDICARE

## 2024-02-23 VITALS
OXYGEN SATURATION: 100 % | HEART RATE: 77 BPM | TEMPERATURE: 99 F | DIASTOLIC BLOOD PRESSURE: 80 MMHG | SYSTOLIC BLOOD PRESSURE: 164 MMHG | WEIGHT: 149.91 LBS | RESPIRATION RATE: 18 BRPM

## 2024-02-23 DIAGNOSIS — A41.9 SEPSIS, UNSPECIFIED ORGANISM: ICD-10-CM

## 2024-02-23 DIAGNOSIS — G93.40 ENCEPHALOPATHY, UNSPECIFIED: ICD-10-CM

## 2024-02-23 DIAGNOSIS — F20.9 SCHIZOPHRENIA, UNSPECIFIED: ICD-10-CM

## 2024-02-23 DIAGNOSIS — K21.9 GASTRO-ESOPHAGEAL REFLUX DISEASE WITHOUT ESOPHAGITIS: ICD-10-CM

## 2024-02-23 DIAGNOSIS — Z29.9 ENCOUNTER FOR PROPHYLACTIC MEASURES, UNSPECIFIED: ICD-10-CM

## 2024-02-23 DIAGNOSIS — E78.5 HYPERLIPIDEMIA, UNSPECIFIED: ICD-10-CM

## 2024-02-23 DIAGNOSIS — K52.9 NONINFECTIVE GASTROENTERITIS AND COLITIS, UNSPECIFIED: ICD-10-CM

## 2024-02-23 DIAGNOSIS — I10 ESSENTIAL (PRIMARY) HYPERTENSION: ICD-10-CM

## 2024-02-23 DIAGNOSIS — E11.9 TYPE 2 DIABETES MELLITUS WITHOUT COMPLICATIONS: ICD-10-CM

## 2024-02-23 DIAGNOSIS — E87.6 HYPOKALEMIA: ICD-10-CM

## 2024-02-23 LAB
ALBUMIN SERPL ELPH-MCNC: 3.9 G/DL — SIGNIFICANT CHANGE UP (ref 3.5–5)
ALP SERPL-CCNC: 84 U/L — SIGNIFICANT CHANGE UP (ref 40–120)
ALT FLD-CCNC: 20 U/L DA — SIGNIFICANT CHANGE UP (ref 10–60)
ANION GAP SERPL CALC-SCNC: 8 MMOL/L — SIGNIFICANT CHANGE UP (ref 5–17)
APPEARANCE UR: CLEAR — SIGNIFICANT CHANGE UP
APTT BLD: 29.5 SEC — SIGNIFICANT CHANGE UP (ref 24.5–35.6)
AST SERPL-CCNC: 14 U/L — SIGNIFICANT CHANGE UP (ref 10–40)
BACTERIA # UR AUTO: ABNORMAL /HPF
BASOPHILS # BLD AUTO: 0 K/UL — SIGNIFICANT CHANGE UP (ref 0–0.2)
BASOPHILS NFR BLD AUTO: 0 % — SIGNIFICANT CHANGE UP (ref 0–2)
BILIRUB SERPL-MCNC: 0.7 MG/DL — SIGNIFICANT CHANGE UP (ref 0.2–1.2)
BILIRUB UR-MCNC: NEGATIVE — SIGNIFICANT CHANGE UP
BUN SERPL-MCNC: 21 MG/DL — HIGH (ref 7–18)
CALCIUM SERPL-MCNC: 8.8 MG/DL — SIGNIFICANT CHANGE UP (ref 8.4–10.5)
CHLORIDE SERPL-SCNC: 106 MMOL/L — SIGNIFICANT CHANGE UP (ref 96–108)
CO2 SERPL-SCNC: 23 MMOL/L — SIGNIFICANT CHANGE UP (ref 22–31)
COLOR SPEC: YELLOW — SIGNIFICANT CHANGE UP
COMMENT - URINE: SIGNIFICANT CHANGE UP
CREAT SERPL-MCNC: 1.04 MG/DL — SIGNIFICANT CHANGE UP (ref 0.5–1.3)
DIFF PNL FLD: ABNORMAL
EGFR: 54 ML/MIN/1.73M2 — LOW
EOSINOPHIL # BLD AUTO: 0 K/UL — SIGNIFICANT CHANGE UP (ref 0–0.5)
EOSINOPHIL NFR BLD AUTO: 0 % — SIGNIFICANT CHANGE UP (ref 0–6)
EPI CELLS # UR: PRESENT
FLUAV AG NPH QL: SIGNIFICANT CHANGE UP
FLUBV AG NPH QL: SIGNIFICANT CHANGE UP
GLUCOSE BLDC GLUCOMTR-MCNC: 174 MG/DL — HIGH (ref 70–99)
GLUCOSE SERPL-MCNC: 287 MG/DL — HIGH (ref 70–99)
GLUCOSE UR QL: 500 MG/DL
HCOV PNL SPEC NAA+PROBE: DETECTED
HCT VFR BLD CALC: 38.7 % — SIGNIFICANT CHANGE UP (ref 34.5–45)
HGB BLD-MCNC: 12.6 G/DL — SIGNIFICANT CHANGE UP (ref 11.5–15.5)
INR BLD: 1.1 RATIO — SIGNIFICANT CHANGE UP (ref 0.85–1.18)
KETONES UR-MCNC: 15 MG/DL
LACTATE SERPL-SCNC: 2.1 MMOL/L — HIGH (ref 0.7–2)
LACTATE SERPL-SCNC: 2.4 MMOL/L — HIGH (ref 0.7–2)
LEUKOCYTE ESTERASE UR-ACNC: NEGATIVE — SIGNIFICANT CHANGE UP
LYMPHOCYTES # BLD AUTO: 0.33 K/UL — LOW (ref 1–3.3)
LYMPHOCYTES # BLD AUTO: 2 % — LOW (ref 13–44)
MAGNESIUM SERPL-MCNC: 2.1 MG/DL — SIGNIFICANT CHANGE UP (ref 1.6–2.6)
MANUAL SMEAR VERIFICATION: SIGNIFICANT CHANGE UP
MCHC RBC-ENTMCNC: 31.7 PG — SIGNIFICANT CHANGE UP (ref 27–34)
MCHC RBC-ENTMCNC: 32.6 GM/DL — SIGNIFICANT CHANGE UP (ref 32–36)
MCV RBC AUTO: 97.2 FL — SIGNIFICANT CHANGE UP (ref 80–100)
MONOCYTES # BLD AUTO: 0.5 K/UL — SIGNIFICANT CHANGE UP (ref 0–0.9)
MONOCYTES NFR BLD AUTO: 3 % — SIGNIFICANT CHANGE UP (ref 2–14)
NEUTROPHILS # BLD AUTO: 15.87 K/UL — HIGH (ref 1.8–7.4)
NEUTROPHILS NFR BLD AUTO: 94 % — HIGH (ref 43–77)
NEUTS BAND # BLD: 1 % — SIGNIFICANT CHANGE UP (ref 0–8)
NITRITE UR-MCNC: NEGATIVE — SIGNIFICANT CHANGE UP
NRBC # BLD: 0 /100 WBCS — SIGNIFICANT CHANGE UP (ref 0–0)
PH UR: 5 — SIGNIFICANT CHANGE UP (ref 5–8)
PLAT MORPH BLD: NORMAL — SIGNIFICANT CHANGE UP
PLATELET # BLD AUTO: 229 K/UL — SIGNIFICANT CHANGE UP (ref 150–400)
PLATELET COUNT - ESTIMATE: NORMAL — SIGNIFICANT CHANGE UP
POTASSIUM SERPL-MCNC: 3.4 MMOL/L — LOW (ref 3.5–5.3)
POTASSIUM SERPL-SCNC: 3.4 MMOL/L — LOW (ref 3.5–5.3)
PROT SERPL-MCNC: 7.4 G/DL — SIGNIFICANT CHANGE UP (ref 6–8.3)
PROT UR-MCNC: ABNORMAL MG/DL
PROTHROM AB SERPL-ACNC: 12.5 SEC — SIGNIFICANT CHANGE UP (ref 9.5–13)
RAPID RVP RESULT: DETECTED
RBC # BLD: 3.98 M/UL — SIGNIFICANT CHANGE UP (ref 3.8–5.2)
RBC # FLD: 12.7 % — SIGNIFICANT CHANGE UP (ref 10.3–14.5)
RBC BLD AUTO: NORMAL — SIGNIFICANT CHANGE UP
RBC CASTS # UR COMP ASSIST: 8 /HPF — HIGH (ref 0–4)
SARS-COV-2 RNA SPEC QL NAA+PROBE: SIGNIFICANT CHANGE UP
SARS-COV-2 RNA SPEC QL NAA+PROBE: SIGNIFICANT CHANGE UP
SODIUM SERPL-SCNC: 137 MMOL/L — SIGNIFICANT CHANGE UP (ref 135–145)
SP GR SPEC: 1.02 — SIGNIFICANT CHANGE UP (ref 1–1.03)
URATE CRY FLD QL MICRO: PRESENT
UROBILINOGEN FLD QL: 0.2 MG/DL — SIGNIFICANT CHANGE UP (ref 0.2–1)
WBC # BLD: 16.7 K/UL — HIGH (ref 3.8–10.5)
WBC # FLD AUTO: 16.7 K/UL — HIGH (ref 3.8–10.5)
WBC UR QL: 4 /HPF — SIGNIFICANT CHANGE UP (ref 0–5)

## 2024-02-23 PROCEDURE — 70450 CT HEAD/BRAIN W/O DYE: CPT | Mod: 26,MC

## 2024-02-23 PROCEDURE — 71045 X-RAY EXAM CHEST 1 VIEW: CPT | Mod: 26

## 2024-02-23 PROCEDURE — 74177 CT ABD & PELVIS W/CONTRAST: CPT | Mod: 26,MC

## 2024-02-23 PROCEDURE — 99291 CRITICAL CARE FIRST HOUR: CPT

## 2024-02-23 PROCEDURE — 99221 1ST HOSP IP/OBS SF/LOW 40: CPT

## 2024-02-23 RX ORDER — METRONIDAZOLE 500 MG
500 TABLET ORAL EVERY 8 HOURS
Refills: 0 | Status: DISCONTINUED | OUTPATIENT
Start: 2024-02-23 | End: 2024-02-27

## 2024-02-23 RX ORDER — INSULIN LISPRO 100/ML
VIAL (ML) SUBCUTANEOUS
Refills: 0 | Status: DISCONTINUED | OUTPATIENT
Start: 2024-02-23 | End: 2024-02-27

## 2024-02-23 RX ORDER — METRONIDAZOLE 500 MG
500 TABLET ORAL ONCE
Refills: 0 | Status: COMPLETED | OUTPATIENT
Start: 2024-02-23 | End: 2024-02-23

## 2024-02-23 RX ORDER — METRONIDAZOLE 500 MG
TABLET ORAL
Refills: 0 | Status: DISCONTINUED | OUTPATIENT
Start: 2024-02-23 | End: 2024-02-27

## 2024-02-23 RX ORDER — METOPROLOL TARTRATE 50 MG
1 TABLET ORAL
Refills: 0 | DISCHARGE

## 2024-02-23 RX ORDER — ENOXAPARIN SODIUM 100 MG/ML
40 INJECTION SUBCUTANEOUS EVERY 24 HOURS
Refills: 0 | Status: DISCONTINUED | OUTPATIENT
Start: 2024-02-23 | End: 2024-02-27

## 2024-02-23 RX ORDER — SITAGLIPTIN 50 MG/1
1 TABLET, FILM COATED ORAL
Refills: 0 | DISCHARGE

## 2024-02-23 RX ORDER — SODIUM CHLORIDE 9 MG/ML
1000 INJECTION, SOLUTION INTRAVENOUS
Refills: 0 | Status: DISCONTINUED | OUTPATIENT
Start: 2024-02-23 | End: 2024-02-27

## 2024-02-23 RX ORDER — ASPIRIN/CALCIUM CARB/MAGNESIUM 324 MG
1 TABLET ORAL
Refills: 0 | DISCHARGE

## 2024-02-23 RX ORDER — PANTOPRAZOLE SODIUM 20 MG/1
40 TABLET, DELAYED RELEASE ORAL
Refills: 0 | Status: DISCONTINUED | OUTPATIENT
Start: 2024-02-23 | End: 2024-02-27

## 2024-02-23 RX ORDER — CEFTRIAXONE 500 MG/1
1000 INJECTION, POWDER, FOR SOLUTION INTRAMUSCULAR; INTRAVENOUS EVERY 24 HOURS
Refills: 0 | Status: DISCONTINUED | OUTPATIENT
Start: 2024-02-23 | End: 2024-02-27

## 2024-02-23 RX ORDER — LOSARTAN POTASSIUM 100 MG/1
1 TABLET, FILM COATED ORAL
Refills: 0 | DISCHARGE

## 2024-02-23 RX ORDER — PIPERACILLIN AND TAZOBACTAM 4; .5 G/20ML; G/20ML
3.38 INJECTION, POWDER, LYOPHILIZED, FOR SOLUTION INTRAVENOUS ONCE
Refills: 0 | Status: COMPLETED | OUTPATIENT
Start: 2024-02-23 | End: 2024-02-23

## 2024-02-23 RX ORDER — ATORVASTATIN CALCIUM 80 MG/1
40 TABLET, FILM COATED ORAL AT BEDTIME
Refills: 0 | Status: DISCONTINUED | OUTPATIENT
Start: 2024-02-23 | End: 2024-02-27

## 2024-02-23 RX ORDER — METOPROLOL TARTRATE 50 MG
25 TABLET ORAL DAILY
Refills: 0 | Status: DISCONTINUED | OUTPATIENT
Start: 2024-02-23 | End: 2024-02-27

## 2024-02-23 RX ORDER — SODIUM CHLORIDE 9 MG/ML
2000 INJECTION INTRAMUSCULAR; INTRAVENOUS; SUBCUTANEOUS ONCE
Refills: 0 | Status: COMPLETED | OUTPATIENT
Start: 2024-02-23 | End: 2024-02-23

## 2024-02-23 RX ORDER — AMLODIPINE BESYLATE 2.5 MG/1
1 TABLET ORAL
Refills: 0 | DISCHARGE

## 2024-02-23 RX ORDER — INSULIN LISPRO 100/ML
VIAL (ML) SUBCUTANEOUS AT BEDTIME
Refills: 0 | Status: DISCONTINUED | OUTPATIENT
Start: 2024-02-23 | End: 2024-02-27

## 2024-02-23 RX ORDER — ONDANSETRON 8 MG/1
4 TABLET, FILM COATED ORAL EVERY 6 HOURS
Refills: 0 | Status: DISCONTINUED | OUTPATIENT
Start: 2024-02-23 | End: 2024-02-27

## 2024-02-23 RX ORDER — ASPIRIN/CALCIUM CARB/MAGNESIUM 324 MG
81 TABLET ORAL DAILY
Refills: 0 | Status: DISCONTINUED | OUTPATIENT
Start: 2024-02-23 | End: 2024-02-27

## 2024-02-23 RX ORDER — POTASSIUM CHLORIDE 20 MEQ
40 PACKET (EA) ORAL ONCE
Refills: 0 | Status: COMPLETED | OUTPATIENT
Start: 2024-02-23 | End: 2024-02-23

## 2024-02-23 RX ADMIN — SODIUM CHLORIDE 2000 MILLILITER(S): 9 INJECTION INTRAMUSCULAR; INTRAVENOUS; SUBCUTANEOUS at 12:36

## 2024-02-23 RX ADMIN — CEFTRIAXONE 100 MILLIGRAM(S): 500 INJECTION, POWDER, FOR SOLUTION INTRAMUSCULAR; INTRAVENOUS at 22:43

## 2024-02-23 RX ADMIN — Medication 40 MILLIEQUIVALENT(S): at 18:05

## 2024-02-23 RX ADMIN — ATORVASTATIN CALCIUM 40 MILLIGRAM(S): 80 TABLET, FILM COATED ORAL at 22:33

## 2024-02-23 RX ADMIN — PIPERACILLIN AND TAZOBACTAM 200 GRAM(S): 4; .5 INJECTION, POWDER, LYOPHILIZED, FOR SOLUTION INTRAVENOUS at 12:36

## 2024-02-23 RX ADMIN — Medication 100 MILLIGRAM(S): at 20:20

## 2024-02-23 NOTE — ED PROVIDER NOTE - PROGRESS NOTE DETAILS
nursing home paperwork found   transfer for lethargy vomiting and diarrhea med list without anticoagulant use   includes schizophrenia diabetes hypertension hyperlipidemia vascular dementia Parkinson's no further relevant details in the nursing paperwork Labs reviewed lactate slightly elevated potassium slightly low   CT head reviewed no acute path noted pending final read chest x-ray reviewed myself questionable left lower lobe pneumonia   antibiotics already provided   repeat lactate for fluids, replete potassium, check magnesium level   CT abdomen pending at this time repeat lactate still pending, UA wa cancelled by lab bc looked diluated, repeat requested  CT with colitis, reviewed findings regarding occlusion of SMA, d/w vasc attending on call Jessica, not concernned of mesenteric ischemia after reviewing h&p, labs, and ct findings in detail, will send PA down to eval patient and write note  called devin salcedo pmd, accepted to inpatient, d/w [facility]

## 2024-02-23 NOTE — ED ADULT NURSE NOTE - NSFALLRISKINTERV_ED_ALL_ED
Assistance OOB with selected safe patient handling equipment if applicable/Assistance with ambulation/Communicate fall risk and risk factors to all staff, patient, and family/Monitor gait and stability/Monitor for mental status changes and reorient to person, place, and time, as needed/Provide patient with walking aids/Provide visual cue: yellow wristband, yellow gown, etc/Reinforce activity limits and safety measures with patient and family/Toileting schedule using arm’s reach rule for commode and bathroom/Use of alarms - bed, stretcher, chair and/or video monitoring/Call bell, personal items and telephone in reach/Instruct patient to call for assistance before getting out of bed/chair/stretcher/Non-slip footwear applied when patient is off stretcher/[place] to call system/Physically safe environment - no spills, clutter or unnecessary equipment/Purposeful Proactive Rounding/Room/bathroom lighting operational, light cord in reach

## 2024-02-23 NOTE — H&P ADULT - HISTORY OF PRESENT ILLNESS
78 year old Female from [facility] ambulates with a walker at baseline with PMHx of schizophrenia on no meds, HTN, HLD, DM, GERD, Mild to moderate vascular dementia, urinary incontinence presents to the ED from NH due to AMS. History is limited due to mental status. Pt denies any complains at this time. AOx1 (name, NAD, comfortable, abdomin with diffuse tenderness to palpation. In ED, VSS, wbc 16, K 3.4, lactate 2.4, CT A/P shows Left ovarian cyst significantly larger than prior. Nonspecific ascending and transverse colitis, either infectious or inflammatory. Fluid-filled small bowel likely enteritis. Stenotic but patent celiac artery with mild poststenotic dilatation. Bladder wall thickening secondary to under distention or cystitis. CTH shows Small basal ganglia and thalamic chronic mild deep infarctions. Ischemic white matter disease and atrophy typical for age. Intracranial atherosclerosis. Pt is admitted for sepsis 2/2 colitis/enteritis.  78 year old Female from [facility] ambulates with a walker at baseline with PMHx of schizophrenia on no meds, HTN, HLD, DM, GERD, Mild to moderate vascular dementia, urinary incontinence presents to the ED from NH due to AMS. History is limited due to mental status. Pt denies any complains at this time. AOx1 (name, NAD, comfortable, abdomin with diffuse tenderness to palpation. In ED, VSS, wbc 16, K 3.4, lactate 2.4, CT A/P shows Left ovarian cyst significantly larger than prior. Nonspecific ascending and transverse colitis, either infectious or inflammatory. Fluid-filled small bowel likely enteritis. Stenotic but patent celiac artery with mild poststenotic dilatation. Bladder wall thickening secondary to under distention or cystitis. CTH shows Small basal ganglia and thalamic chronic mild deep infarctions. Ischemic white matter disease and atrophy typical for age. Intracranial atherosclerosis. Pt is admitted for sepsis 2/2 colitis/enteritis.  78 year old Female from [facility] ambulates with a walker AOx3  at baseline with PMHx of schizophrenia on no meds, HTN, HLD, DM, GERD, Mild to moderate vascular dementia, urinary incontinence presents to the ED from NH due to AMS. History is limited due to mental status. Pt denies any complains at this time. AOx1 (name, NAD, comfortable, abdomin with diffuse tenderness to palpation. In ED, VSS, wbc 16, K 3.4, lactate 2.4, CT A/P shows Left ovarian cyst significantly larger than prior. Nonspecific ascending and transverse colitis, either infectious or inflammatory. Fluid-filled small bowel likely enteritis. Stenotic but patent celiac artery with mild poststenotic dilatation. Bladder wall thickening secondary to under distention or cystitis. CTH shows Small basal ganglia and thalamic chronic mild deep infarctions. Ischemic white matter disease and atrophy typical for age. Intracranial atherosclerosis. Pt is admitted for sepsis 2/2 colitis/enteritis.

## 2024-02-23 NOTE — H&P ADULT - PROBLEM SELECTOR PLAN 8
h/o HTN on losartan 50 mg qd, metoprolol succinate 25 mg qd, norvasc 10 mg qd  Monitor BP  c/w metoprolol 25 mg qd   hold rest of home meds in the setting of sepsis

## 2024-02-23 NOTE — ED PROVIDER NOTE - OBJECTIVE STATEMENT
78-year-old female presents for altered mental status, seen at the start of my shift.  She is confused and only alert to name. she does not provide any details to complaints  There is no nursing paperwork available for me to review.  Per triage note  she is here for febrile illness in the nursing home with altered mental status   further information available at this time

## 2024-02-23 NOTE — ED PROVIDER NOTE - CARE PLAN
1 Principal Discharge DX:	Colitis  Secondary Diagnosis:	Sepsis, unspecified organism  Secondary Diagnosis:	Hypokalemia  Secondary Diagnosis:	Dehydration

## 2024-02-23 NOTE — H&P ADULT - ATTENDING COMMENTS
78 year old Female from [facility] ambulates with a walker AOx3  at baseline with PMHx of schizophrenia on no meds, HTN, HLD, DM, GERD, Mild to moderate vascular dementia, urinary incontinence presents to the ED from NH due to AMS. History is limited due to mental status. Pt denies any complains at this time. AOx1 (name, NAD, comfortable, abdomin with diffuse tenderness to palpation. In ED, VSS, wbc 16, K 3.4, lactate 2.4, CT A/P shows Left ovarian cyst significantly larger than prior. Nonspecific ascending and transverse colitis, either infectious or inflammatory. Fluid-filled small bowel likely enteritis. Stenotic but patent celiac artery with mild poststenotic dilatation. Bladder wall thickening secondary to under distention or cystitis. CTH shows Small basal ganglia and thalamic chronic mild deep infarctions. Ischemic white matter disease and atrophy typical for age. Intracranial atherosclerosis. Pt is admitted for sepsis 2/2 colitis/enteritis.     # SEPSIS S/T COLITIS, ENTERITIS + CORONAVIRUS  # R/O UTI  - PLACED ON ROCEPHIN + FLAGYLL, F/U BCX AND UCX  - NOTED CT A/P  - CLEAR LIQUID DIET / DIET AS TOLERATED      # ACUTE ENCEPHALOPATHY ON CHRONIC DEMENTIA  # ? TIA  - NOTED CT HEAD  - ASA, PLAVIX, STATIN  - F/U TSH/LIPIDS/HBA1C  - F/U ECHOCARDIOGRAM W/ BUBBLE STUDY  - PASSED BEDSIDE SWALLOW  - F/U PT EVAL  - NOTED CT HEAD  - NEUROLOGY CONSULT    # LEFT OVARIAN CYST  - F/U TVUS AND PELVIC US  - GYN CONSULT    # CELIAC ARTERY STENOSIS  - VASCULAR CONSULT PLACED    # HTN  # HLD  # DM  # GERD  # GI AND DVT PPX 78 year old Female from [facility] ambulates with a walker AOx3  at baseline with PMHx of schizophrenia on no meds, HTN, HLD, DM, GERD, Mild to moderate vascular dementia, urinary incontinence presents to the ED from NH due to AMS. History is limited due to mental status. Pt denies any complains at this time. AOx1 (name, NAD, comfortable, abdomin with diffuse tenderness to palpation. In ED, VSS, wbc 16, K 3.4, lactate 2.4, CT A/P shows Left ovarian cyst significantly larger than prior. Nonspecific ascending and transverse colitis, either infectious or inflammatory. Fluid-filled small bowel likely enteritis. Stenotic but patent celiac artery with mild poststenotic dilatation. Bladder wall thickening secondary to under distention or cystitis. CTH shows Small basal ganglia and thalamic chronic mild deep infarctions. Ischemic white matter disease and atrophy typical for age. Intracranial atherosclerosis. Pt is admitted for sepsis 2/2 colitis/enteritis.     # SEPSIS S/T COLITIS, ENTERITIS + CORONAVIRUS  # R/O UTI  - PLACED ON ROCEPHIN + FLAGYLL, F/U BCX AND UCX  - NOTED CT A/P  - CLEAR LIQUID DIET / DIET AS TOLERATED      # ACUTE ENCEPHALOPATHY ON CHRONIC DEMENTIA  # ? TIA  - NOTED CT HEAD  - ASA, PLAVIX, STATIN  - F/U TSH/LIPIDS/HBA1C  - F/U ECHOCARDIOGRAM W/ BUBBLE STUDY  - PASSED BEDSIDE SWALLOW  - F/U PT EVAL  - NOTED CT HEAD  - NEUROLOGY CONSULT    # ELEVATED LACTIC ACID  - TREND LACTATE    # LEFT OVARIAN CYST  - F/U TVUS AND PELVIC US  - GYN CONSULT    # CELIAC ARTERY STENOSIS  - VASCULAR CONSULT PLACED    # HTN  # HLD  # DM  # GERD  # GI AND DVT PPX

## 2024-02-23 NOTE — ED ADULT TRIAGE NOTE - CHIEF COMPLAINT QUOTE
AMS x today, as per EMS FS was 352 on the field, + lethargy, nausea/ vomiting, febrile at the nursing home

## 2024-02-23 NOTE — H&P ADULT - NSICDXPASTMEDICALHX_GEN_ALL_CORE_FT
PAST MEDICAL HISTORY:  DM (diabetes mellitus)     GERD (gastroesophageal reflux disease)     HLD (hyperlipidemia)     HTN (hypertension)     Schizophrenia

## 2024-02-23 NOTE — CONSULT NOTE ADULT - SUBJECTIVE AND OBJECTIVE BOX
Patient is a 78y old  Female who presents with a chief complaint of     HPI  Called see and eval 78y.o. Female w/PMH significant for HTN, HLD, DM, schizophrenia for abnormal CT findings. Pt sent from [facility] for AMS. Pt unable to provide medical history. Oriented to person only. Denies abd pain.     PAST MEDICAL & SURGICAL HISTORY:  Schizophrenia      No significant past surgical history    MEDICATIONS  (STANDING):  potassium chloride    Tablet ER 40 milliEquivalent(s) Oral once    MEDICATIONS  (PRN):      Allergies    No Known Allergies    Intolerances    Vital Signs Last 24 Hrs  T(C): 37.8 (23 Feb 2024 12:38), Max: 37.8 (23 Feb 2024 12:38)  T(F): 100 (23 Feb 2024 12:38), Max: 100 (23 Feb 2024 12:38)  HR: 74 (23 Feb 2024 13:23) (74 - 77)  BP: 136/70 (23 Feb 2024 13:23) (136/70 - 164/80)  BP(mean): --  RR: 17 (23 Feb 2024 13:23) (17 - 18)  SpO2: 100% (23 Feb 2024 13:23) (100% - 100%)    Parameters below as of 23 Feb 2024 13:23  Patient On (Oxygen Delivery Method): room air    Physical:  Gen: A&Ox1. NAD  Abd: Soft ND, no tenderness elicited. Nontympanic.  Cam noted be out.    LABS:                        12.6   16.70 )-----------( 229      ( 23 Feb 2024 12:51 )             38.7              02-23    137  |  106  |  21<H>  ----------------------------<  287<H>  3.4<L>   |  23  |  1.04    Ca    8.8      23 Feb 2024 12:51    TPro  7.4  /  Alb  3.9  /  TBili  0.7  /  DBili  x   /  AST  14  /  ALT  20  /  AlkPhos  84  02-23            PT/INR - ( 23 Feb 2024 12:51 )   PT: 12.5 sec;   INR: 1.10 ratio         PTT - ( 23 Feb 2024 12:51 )  PTT:29.5 sec  Urinalysis Basic - ( 23 Feb 2024 12:51 )    Color: x / Appearance: x / SG: x / pH: x  Gluc: 287 mg/dL / Ketone: x  / Bili: x / Urobili: x   Blood: x / Protein: x / Nitrite: x   Leuk Esterase: x / RBC: x / WBC x   Sq Epi: x / Non Sq Epi: x / Bacteria: x    RADIOLOGY & ADDITIONAL STUDIES:  < from: CT Abdomen and Pelvis w/ IV Cont (02.23.24 @ 14:40) >  KIDNEYS/URETERS: Within normal limits.    BLADDER: Decompressed. Diffuse wall thickening.  REPRODUCTIVE ORGANS: Unremarkable uterus. 2.4 x 3.5 cm left ovarian cyst,   previously 1.7 x 1.0 cm.    BOWEL: No bowel obstruction. Appendix  . Ascending and transverse colonic   wall thickening. Fluid-filled nondilated small bowel. No pneumatosis.  PERITONEUM: No ascites.  VESSELS: Atherosclerotic aorta with atheromatous plaque. Patent celiac   artery, SMA and ROSS however the proximal celiac artery is stenotic with   mild poststenotic dilatation to 8 mm.. No portal venous gas. Patent   hepatic, portal, splenic and superior mesenteric veins.  RETROPERITONEUM/LYMPH NODES: No lymphadenopathy.    < end of copied text >

## 2024-02-23 NOTE — ED PROVIDER NOTE - CLINICAL SUMMARY MEDICAL DECISION MAKING FREE TEXT BOX
Nursing home patient with low-grade temp on arrival code sepsis activated.  Provide antibiotics provided.  2 L normal saline requested until we can get her weight.  Will attempt to find nursing home paperwork.  Will CT due to tenderness to rule out pathology  as causeof fever   Cam requested for urine assessment 2023

## 2024-02-23 NOTE — H&P ADULT - ASSESSMENT
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 In ED, VSS, wbc 16, K 3.4, lactate 2.4, CT A/P shows Left ovarian cyst significantly larger than prior. Nonspecific ascending and transverse colitis, either infectious or inflammatory. Fluid-filled small bowel likely enteritis. Stenotic but patent celiac artery with mild poststenotic dilatation. Bladder wall thickening secondary to under distention or cystitis. CTH shows Small basal ganglia and thalamic chronic mild deep infarctions. Ischemic white matter disease and atrophy typical for age. Intracranial atherosclerosis. Pt is admitted for sepsis 2/2 colitis/enteritis.

## 2024-02-23 NOTE — CONSULT NOTE ADULT - NS ATTEND AMEND GEN_ALL_CORE FT
78F p/w AMS, leukocytosis. 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 CT shows patent celiac, SMA, ROSS with generalized atherosclerosis.   -Recommend Aspirin, statin  -Evaluation for AMS/infection per primary team  -Antibiotics and resuscitation

## 2024-02-23 NOTE — H&P ADULT - PROBLEM SELECTOR PLAN 7
- Patient takes Glipizide 5 mg qd, and januvia 25 mg qd at home  - will hold home medications  - will start sliding scale  - diabetic diet

## 2024-02-23 NOTE — CHART NOTE - NSCHARTNOTEFT_GEN_A_CORE
EVENT: Pt with episode of bilious vomiting    BRIEF HPI:        OBJECTIVE:  Vital Signs Last 24 Hrs  T(C): 37.8 (23 Feb 2024 23:32), Max: 37.8 (23 Feb 2024 12:38)  T(F): 100.1 (23 Feb 2024 23:32), Max: 100.1 (23 Feb 2024 23:32)  HR: 96 (23 Feb 2024 23:32) (72 - 96)  BP: 156/68 (23 Feb 2024 23:32) (129/88 - 164/80)  BP(mean): --  RR: 18 (23 Feb 2024 23:32) (17 - 18)  SpO2: 98% (23 Feb 2024 23:32) (98% - 100%)    Parameters below as of 23 Feb 2024 23:32  Patient On (Oxygen Delivery Method): room air        FOCUSED PHYSICAL EXAM:    LABS:                        12.6   16.70 )-----------( 229      ( 23 Feb 2024 12:51 )             38.7     02-23    137  |  106  |  21<H>  ----------------------------<  287<H>  3.4<L>   |  23  |  1.04    Ca    8.8      23 Feb 2024 12:51  Mg     2.1     02-23    TPro  7.4  /  Alb  3.9  /  TBili  0.7  /  DBili  x   /  AST  14  /  ALT  20  /  AlkPhos  84  02-23      EKG:   IMGAGING:    ASSESSMENT:  HPI:  78 year old Female from [facility] ambulates with a walker AOx3  at baseline with PMHx of schizophrenia on no meds, HTN, HLD, DM, GERD, Mild to moderate vascular dementia, urinary incontinence presents to the ED from NH due to AMS. History is limited due to mental status. Pt denies any complains at this time. AOx1 (name, NAD, comfortable, abdomin with diffuse tenderness to palpation. In ED, VSS, wbc 16, K 3.4, lactate 2.4, CT A/P shows Left ovarian cyst significantly larger than prior. Nonspecific ascending and transverse colitis, either infectious or inflammatory. Fluid-filled small bowel likely enteritis. Stenotic but patent celiac artery with mild poststenotic dilatation. Bladder wall thickening secondary to under distention or cystitis. CTH shows Small basal ganglia and thalamic chronic mild deep infarctions. Ischemic white matter disease and atrophy typical for age. Intracranial atherosclerosis. Pt is admitted for sepsis 2/2 colitis/enteritis.  (23 Feb 2024 17:20)      PLAN:   EVENT:     OBJECTIVE:  Vital Signs Last 24 Hrs  T(C): 37.8 (23 Feb 2024 23:32), Max: 37.8 (23 Feb 2024 12:38)  T(F): 100.1 (23 Feb 2024 23:32), Max: 100.1 (23 Feb 2024 23:32)  HR: 96 (23 Feb 2024 23:32) (72 - 96)  BP: 156/68 (23 Feb 2024 23:32) (129/88 - 164/80)  BP(mean): --  RR: 18 (23 Feb 2024 23:32) (17 - 18)  SpO2: 98% (23 Feb 2024 23:32) (98% - 100%)    Parameters below as of 23 Feb 2024 23:32  Patient On (Oxygen Delivery Method): room air        FOCUSED PHYSICAL EXAM:    LABS:                        12.6   16.70 )-----------( 229      ( 23 Feb 2024 12:51 )             38.7     02-23    137  |  106  |  21<H>  ----------------------------<  287<H>  3.4<L>   |  23  |  1.04    Ca    8.8      23 Feb 2024 12:51  Mg     2.1     02-23    TPro  7.4  /  Alb  3.9  /  TBili  0.7  /  DBili  x   /  AST  14  /  ALT  20  /  AlkPhos  84  02-23      EKG:   IMGAGING:    ASSESSMENT:  HPI:  78 year old Female from [facility] ambulates with a walker AOx3  at baseline with PMHx of schizophrenia on no meds, HTN, HLD, DM, GERD, Mild to moderate vascular dementia, urinary incontinence presents to the ED from NH due to AMS. History is limited due to mental status. Pt denies any complains at this time. AOx1 (name, NAD, comfortable, abdomin with diffuse tenderness to palpation. In ED, VSS, wbc 16, K 3.4, lactate 2.4, CT A/P shows Left ovarian cyst significantly larger than prior. Nonspecific ascending and transverse colitis, either infectious or inflammatory. Fluid-filled small bowel likely enteritis. Stenotic but patent celiac artery with mild poststenotic dilatation. Bladder wall thickening secondary to under distention or cystitis. CTH shows Small basal ganglia and thalamic chronic mild deep infarctions. Ischemic white matter disease and atrophy typical for age. Intracranial atherosclerosis. Pt is admitted for sepsis 2/2 colitis/enteritis.  (23 Feb 2024 17:20)      PLAN:     FOLLOW UP / RESULT:   MEDICATIONS  (STANDING):  aspirin  chewable 81 milliGRAM(s) Oral daily  atorvastatin 40 milliGRAM(s) Oral at bedtime  cefTRIAXone   IVPB 1000 milliGRAM(s) IV Intermittent every 24 hours  enoxaparin Injectable 40 milliGRAM(s) SubCutaneous every 24 hours  insulin lispro (ADMELOG) corrective regimen sliding scale   SubCutaneous three times a day before meals  insulin lispro (ADMELOG) corrective regimen sliding scale   SubCutaneous at bedtime  lactated ringers. 1000 milliLiter(s) (75 mL/Hr) IV Continuous <Continuous>  metoprolol succinate ER 25 milliGRAM(s) Oral daily  metroNIDAZOLE  IVPB      metroNIDAZOLE  IVPB 500 milliGRAM(s) IV Intermittent every 8 hours  pantoprazole    Tablet 40 milliGRAM(s) Oral before breakfast    MEDICATIONS  (PRN):  ondansetron Injectable 4 milliGRAM(s) IV Push every 6 hours PRN Vomiting    FOLLOW UP / RESULT: EVENT: Pt with episode of bilious vomiting. Follow up lactate trend    Lactate, Blood: 2.0 mmol/L (02.24.24 @ 00:22)  Lactate, Blood: 2.1: Elevated lactate mmol/L (02.23.24 @ 19:40)  Lactate, Blood: 2.4: mmol/L (02.23.24 @ 12:51)    BRIEF HPI: 78 year old Female from [facility] ambulates with a walker at baseline with PMH of schizophrenia on no meds, HTN, HLD, DM, GERD, Mild to moderate vascular dementia, urinary incontinence presents to the ED from NH due to AMS. In ED, VSS, wbc 16, K 3.4, lactate 2.4, CT A/P shows Left ovarian cyst significantly larger than prior. Nonspecific ascending and transverse colitis, either infectious or inflammatory. Fluid-filled small bowel likely enteritis. Stenotic but patent celiac artery with mild poststenotic dilatation. Bladder wall thickening secondary to under distention or cystitis. CTH shows Small basal ganglia and thalamic chronic mild deep infarctions. Ischemic white matter disease and atrophy typical for age. Intracranial atherosclerosis. Pt is admitted for sepsis 2/2 colitis/enteritis.     OBJECTIVE:  Vital Signs Last 24 Hrs  T(C): 37.8 (23 Feb 2024 23:32), Max: 37.8 (23 Feb 2024 12:38)  T(F): 100.1 (23 Feb 2024 23:32), Max: 100.1 (23 Feb 2024 23:32)  HR: 96 (23 Feb 2024 23:32) (72 - 96)  BP: 156/68 (23 Feb 2024 23:32) (129/88 - 164/80)  BP(mean): --  RR: 18 (23 Feb 2024 23:32) (17 - 18)  SpO2: 98% (23 Feb 2024 23:32) (98% - 100%)    Parameters below as of 23 Feb 2024 23:32  Patient On (Oxygen Delivery Method): room air    FOCUSED PHYSICAL EXAM:  ABD: Soft, rounded, non tender  RESP unlabored, even  CV: S1 S2, regular  NEURO: drowsy, oriented to name    LABS:                        12.6   16.70 )-----------( 229      ( 23 Feb 2024 12:51 )             38.7     02-23    137  |  106  |  21<H>  ----------------------------<  287<H>  3.4<L>   |  23  |  1.04    Ca    8.8      23 Feb 2024 12:51  Mg     2.1     02-23    TPro  7.4  /  Alb  3.9  /  TBili  0.7  /  DBili  x   /  AST  14  /  ALT  20  /  Alk Phos  84  02-23    Lactate, Blood: 2.0 mmol/L (02.24.24 @ 00:22)  Lactate, Blood: 2.1: Elevated lactate mmol/L (02.23.24 @ 19:40)  Lactate, Blood: 2.4: mmol/L (02.23.24 @ 12:51)    Urine Microscopic-Add On (NC) (02.23.24 @ 19:30)    Uric Acid Crystals: Present: few in alex form    Red Blood Cell - Urine: 8 /HPF    White Blood Cell - Urine: 4 /HPF    Bacteria: Few /HPF    Comment - Urine: trace of amorphous sedimentation and mucus strands seen    Squamous Epithelial Cells: Present    PLAN:   Ondansetron Injectable 4 bryant GRAM(s) IV Push every 6 hours PRN Vomiting X 1 dys  Cont lactated ringers 1000 milliliter(s) (75 mL/Hr) IV Continuous   Cont pantoprazole  Tablet 40 bryant GRAM(s) Oral before breakfast  Cont Diet, Clear Liquid: Consistent Carbohydrate {No Snacks} DASH/ TLC {Sodium & Cholesterol Restricted} (02-23-24 @ 17:20) [Active]    FOLLOW UP / RESULT: effectiveness of med

## 2024-02-23 NOTE — ED PROVIDER NOTE - PHYSICAL EXAMINATION
very dry mucous membranes.  Alert only to person not time or year.  Tender Abdomen most prominent in left lower quadrant.

## 2024-02-23 NOTE — ED PROVIDER NOTE - WR INTERPRETATION DATE TIME  1
Pt up to bathroom with minimal assistance. States pain is down to 1/10. Awaiting results and CT. 23-Feb-2024 14:40

## 2024-02-23 NOTE — H&P ADULT - PROBLEM SELECTOR PLAN 4
p/w ams in the setting of sepsis  AO x 1 (name) in ED  unknown baseline  CTH shows Small basal ganglia and thalamic chronic mild deep infarctions. Ischemic white matter disease and atrophy typical for age. Intracranial atherosclerosis  rest of plan as above p/w ams in the setting of sepsis  AO x 1 (name) in ED  AO x 3 at baseline  CTH shows Small basal ganglia and thalamic chronic mild deep infarctions. Ischemic white matter disease and atrophy typical for age. Intracranial atherosclerosis  rest of plan as above  tele monitor  Neurology consulted Dr. Higuera

## 2024-02-23 NOTE — H&P ADULT - NSHPPHYSICALEXAM_GEN_ALL_CORE
GENERAL: NAD, elderly female, confused  HEAD:  Atraumatic, Normocephalic  EYES: EOMI, PERRLA, conjunctiva and sclera clear  ENMT: No tonsillar erythema, exudates, or enlargement; dry mucous membranes, Good dentition, No lesions  NECK: Supple, normal appearance, No JVD; Normal thyroid; Trachea midline  NERVOUS SYSTEM:  Alert & Oriented X31 (name)  Motor Strength 5/5 B/L upper and lower extremities, sensation intact  CHEST/LUNG: Lungs clear to auscultation bilaterally, No rales, rhonchi, wheezing   HEART: Regular rate and rhythm; No murmurs, rubs, or gallops  ABDOMEN: Soft, tender to palpation, Nondistended; Bowel sounds present  EXTREMITIES:  2+ Peripheral Pulses, No clubbing, cyanosis, or edema  LYMPH: No lymphadenopathy noted  SKIN: No rashes or lesions;  Good capillary refill, Dry skin

## 2024-02-23 NOTE — H&P ADULT - NSHPPOAPULMEMBOLUS_GEN_A_CORE
Son has cough and runny nose. Tried oncare but he is under age 1 so they advised he call and do phone visit.    No sob.  Asking for scheduling.    Transferred to scheduling    Sanjana Stinson RN  [facility] Nurse Advisor     no

## 2024-02-23 NOTE — CONSULT NOTE ADULT - ASSESSMENT
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

## 2024-02-24 LAB
ALBUMIN SERPL ELPH-MCNC: 3.3 G/DL — LOW (ref 3.5–5)
ALP SERPL-CCNC: 63 U/L — SIGNIFICANT CHANGE UP (ref 40–120)
ALT FLD-CCNC: 18 U/L DA — SIGNIFICANT CHANGE UP (ref 10–60)
ANION GAP SERPL CALC-SCNC: 4 MMOL/L — LOW (ref 5–17)
AST SERPL-CCNC: 12 U/L — SIGNIFICANT CHANGE UP (ref 10–40)
BASOPHILS # BLD AUTO: 0.02 K/UL — SIGNIFICANT CHANGE UP (ref 0–0.2)
BASOPHILS NFR BLD AUTO: 0.2 % — SIGNIFICANT CHANGE UP (ref 0–2)
BILIRUB SERPL-MCNC: 0.5 MG/DL — SIGNIFICANT CHANGE UP (ref 0.2–1.2)
BUN SERPL-MCNC: 15 MG/DL — SIGNIFICANT CHANGE UP (ref 7–18)
CALCIUM SERPL-MCNC: 8.3 MG/DL — LOW (ref 8.4–10.5)
CHLORIDE SERPL-SCNC: 113 MMOL/L — HIGH (ref 96–108)
CO2 SERPL-SCNC: 24 MMOL/L — SIGNIFICANT CHANGE UP (ref 22–31)
CREAT SERPL-MCNC: 0.66 MG/DL — SIGNIFICANT CHANGE UP (ref 0.5–1.3)
CULTURE RESULTS: NO GROWTH — SIGNIFICANT CHANGE UP
CULTURE RESULTS: NO GROWTH — SIGNIFICANT CHANGE UP
EGFR: 89 ML/MIN/1.73M2 — SIGNIFICANT CHANGE UP
EOSINOPHIL # BLD AUTO: 0 K/UL — SIGNIFICANT CHANGE UP (ref 0–0.5)
EOSINOPHIL NFR BLD AUTO: 0 % — SIGNIFICANT CHANGE UP (ref 0–6)
GLUCOSE BLDC GLUCOMTR-MCNC: 104 MG/DL — HIGH (ref 70–99)
GLUCOSE BLDC GLUCOMTR-MCNC: 118 MG/DL — HIGH (ref 70–99)
GLUCOSE BLDC GLUCOMTR-MCNC: 130 MG/DL — HIGH (ref 70–99)
GLUCOSE BLDC GLUCOMTR-MCNC: 134 MG/DL — HIGH (ref 70–99)
GLUCOSE SERPL-MCNC: 173 MG/DL — HIGH (ref 70–99)
HCT VFR BLD CALC: 33.9 % — LOW (ref 34.5–45)
HGB BLD-MCNC: 10.8 G/DL — LOW (ref 11.5–15.5)
IMM GRANULOCYTES NFR BLD AUTO: 0.3 % — SIGNIFICANT CHANGE UP (ref 0–0.9)
LACTATE SERPL-SCNC: 2 MMOL/L — SIGNIFICANT CHANGE UP (ref 0.7–2)
LYMPHOCYTES # BLD AUTO: 0.88 K/UL — LOW (ref 1–3.3)
LYMPHOCYTES # BLD AUTO: 7.8 % — LOW (ref 13–44)
MAGNESIUM SERPL-MCNC: 2 MG/DL — SIGNIFICANT CHANGE UP (ref 1.6–2.6)
MCHC RBC-ENTMCNC: 30.9 PG — SIGNIFICANT CHANGE UP (ref 27–34)
MCHC RBC-ENTMCNC: 31.9 GM/DL — LOW (ref 32–36)
MCV RBC AUTO: 97.1 FL — SIGNIFICANT CHANGE UP (ref 80–100)
MONOCYTES # BLD AUTO: 0.64 K/UL — SIGNIFICANT CHANGE UP (ref 0–0.9)
MONOCYTES NFR BLD AUTO: 5.6 % — SIGNIFICANT CHANGE UP (ref 2–14)
NEUTROPHILS # BLD AUTO: 9.78 K/UL — HIGH (ref 1.8–7.4)
NEUTROPHILS NFR BLD AUTO: 86.1 % — HIGH (ref 43–77)
NRBC # BLD: 0 /100 WBCS — SIGNIFICANT CHANGE UP (ref 0–0)
PHOSPHATE SERPL-MCNC: 2 MG/DL — LOW (ref 2.5–4.5)
PLATELET # BLD AUTO: 207 K/UL — SIGNIFICANT CHANGE UP (ref 150–400)
POTASSIUM SERPL-MCNC: 3.3 MMOL/L — LOW (ref 3.5–5.3)
POTASSIUM SERPL-SCNC: 3.3 MMOL/L — LOW (ref 3.5–5.3)
PROT SERPL-MCNC: 6.5 G/DL — SIGNIFICANT CHANGE UP (ref 6–8.3)
RBC # BLD: 3.49 M/UL — LOW (ref 3.8–5.2)
RBC # FLD: 12.9 % — SIGNIFICANT CHANGE UP (ref 10.3–14.5)
SODIUM SERPL-SCNC: 141 MMOL/L — SIGNIFICANT CHANGE UP (ref 135–145)
SPECIMEN SOURCE: SIGNIFICANT CHANGE UP
SPECIMEN SOURCE: SIGNIFICANT CHANGE UP
WBC # BLD: 11.35 K/UL — HIGH (ref 3.8–10.5)
WBC # FLD AUTO: 11.35 K/UL — HIGH (ref 3.8–10.5)

## 2024-02-24 PROCEDURE — 99223 1ST HOSP IP/OBS HIGH 75: CPT

## 2024-02-24 RX ORDER — POTASSIUM PHOSPHATE, MONOBASIC POTASSIUM PHOSPHATE, DIBASIC 236; 224 MG/ML; MG/ML
15 INJECTION, SOLUTION INTRAVENOUS ONCE
Refills: 0 | Status: COMPLETED | OUTPATIENT
Start: 2024-02-24 | End: 2024-02-24

## 2024-02-24 RX ORDER — HALOPERIDOL DECANOATE 100 MG/ML
1 INJECTION INTRAMUSCULAR ONCE
Refills: 0 | Status: COMPLETED | OUTPATIENT
Start: 2024-02-24 | End: 2024-02-24

## 2024-02-24 RX ORDER — CLOPIDOGREL BISULFATE 75 MG/1
75 TABLET, FILM COATED ORAL DAILY
Refills: 0 | Status: DISCONTINUED | OUTPATIENT
Start: 2024-02-24 | End: 2024-02-27

## 2024-02-24 RX ORDER — POTASSIUM CHLORIDE 20 MEQ
10 PACKET (EA) ORAL
Refills: 0 | Status: COMPLETED | OUTPATIENT
Start: 2024-02-24 | End: 2024-02-24

## 2024-02-24 RX ADMIN — CLOPIDOGREL BISULFATE 75 MILLIGRAM(S): 75 TABLET, FILM COATED ORAL at 12:04

## 2024-02-24 RX ADMIN — ATORVASTATIN CALCIUM 40 MILLIGRAM(S): 80 TABLET, FILM COATED ORAL at 21:51

## 2024-02-24 RX ADMIN — Medication 100 MILLIEQUIVALENT(S): at 12:03

## 2024-02-24 RX ADMIN — ENOXAPARIN SODIUM 40 MILLIGRAM(S): 100 INJECTION SUBCUTANEOUS at 06:27

## 2024-02-24 RX ADMIN — SODIUM CHLORIDE 75 MILLILITER(S): 9 INJECTION, SOLUTION INTRAVENOUS at 14:07

## 2024-02-24 RX ADMIN — SODIUM CHLORIDE 75 MILLILITER(S): 9 INJECTION, SOLUTION INTRAVENOUS at 02:05

## 2024-02-24 RX ADMIN — Medication 81 MILLIGRAM(S): at 12:04

## 2024-02-24 RX ADMIN — Medication 25 MILLIGRAM(S): at 06:26

## 2024-02-24 RX ADMIN — Medication 100 MILLIEQUIVALENT(S): at 09:52

## 2024-02-24 RX ADMIN — Medication 100 MILLIGRAM(S): at 22:30

## 2024-02-24 RX ADMIN — POTASSIUM PHOSPHATE, MONOBASIC POTASSIUM PHOSPHATE, DIBASIC 62.5 MILLIMOLE(S): 236; 224 INJECTION, SOLUTION INTRAVENOUS at 13:11

## 2024-02-24 RX ADMIN — HALOPERIDOL DECANOATE 1 MILLIGRAM(S): 100 INJECTION INTRAMUSCULAR at 21:51

## 2024-02-24 RX ADMIN — CEFTRIAXONE 100 MILLIGRAM(S): 500 INJECTION, POWDER, FOR SOLUTION INTRAMUSCULAR; INTRAVENOUS at 21:50

## 2024-02-24 RX ADMIN — Medication 100 MILLIGRAM(S): at 14:07

## 2024-02-24 RX ADMIN — PANTOPRAZOLE SODIUM 40 MILLIGRAM(S): 20 TABLET, DELAYED RELEASE ORAL at 06:26

## 2024-02-24 RX ADMIN — Medication 100 MILLIGRAM(S): at 06:26

## 2024-02-24 RX ADMIN — Medication 100 MILLIEQUIVALENT(S): at 10:37

## 2024-02-24 NOTE — CONSULT NOTE ADULT - SUBJECTIVE AND OBJECTIVE BOX
***TEMPLATE ONLY***      Patient is a 78y old  Female who presents with a chief complaint of Sepsis 2/2 colitis/enteritis (24 Feb 2024 15:42)      HPI:  78 year old Female from [facility] ambulates with a walker AOx3  at baseline with PMHx of schizophrenia on no meds, HTN, HLD, DM, GERD, Mild to moderate vascular dementia, urinary incontinence presents to the ED from NH due to AMS. History is limited due to mental status. Pt denies any complains at this time. AOx1 (name, NAD, comfortable, abdomin with diffuse tenderness to palpation. In ED, VSS, wbc 16, K 3.4, lactate 2.4, CT A/P shows Left ovarian cyst significantly larger than prior. Nonspecific ascending and transverse colitis, either infectious or inflammatory. Fluid-filled small bowel likely enteritis. Stenotic but patent celiac artery with mild poststenotic dilatation. Bladder wall thickening secondary to under distention or cystitis. CTH shows Small basal ganglia and thalamic chronic mild deep infarctions. Ischemic white matter disease and atrophy typical for age. Intracranial atherosclerosis. Pt is admitted for sepsis 2/2 colitis/enteritis.  (23 Feb 2024 17:20)         Neurological Review of Systems:  No difficulty with language.  No vision loss or double vision.  No dizziness, vertigo or new hearing loss.  No difficulty with speech or swallowing.  No focal weakness.  No focal sensory changes.  No numbness or tingling in the bilateral lower extremities.  No difficulty with balance.  No difficulty with ambulation.        MEDICATIONS  (STANDING):  aspirin  chewable 81 milliGRAM(s) Oral daily  atorvastatin 40 milliGRAM(s) Oral at bedtime  cefTRIAXone   IVPB 1000 milliGRAM(s) IV Intermittent every 24 hours  clopidogrel Tablet 75 milliGRAM(s) Oral daily  enoxaparin Injectable 40 milliGRAM(s) SubCutaneous every 24 hours  insulin lispro (ADMELOG) corrective regimen sliding scale   SubCutaneous at bedtime  insulin lispro (ADMELOG) corrective regimen sliding scale   SubCutaneous three times a day before meals  lactated ringers. 1000 milliLiter(s) (75 mL/Hr) IV Continuous <Continuous>  metoprolol succinate ER 25 milliGRAM(s) Oral daily  metroNIDAZOLE  IVPB      metroNIDAZOLE  IVPB 500 milliGRAM(s) IV Intermittent every 8 hours  pantoprazole    Tablet 40 milliGRAM(s) Oral before breakfast    MEDICATIONS  (PRN):  ondansetron Injectable 4 milliGRAM(s) IV Push every 6 hours PRN Vomiting    Allergies    No Known Allergies    Intolerances      PAST MEDICAL & SURGICAL HISTORY:  Schizophrenia      DM (diabetes mellitus)      HTN (hypertension)      HLD (hyperlipidemia)      GERD (gastroesophageal reflux disease)      No significant past surgical history        FAMILY HISTORY:  No pertinent family history in first degree relatives      SOCIAL HISTORY: non smoker/ former smoker/ active smoker    Review of Systems:  Constitutional: No generalized weakness. No fevers or chills.                    Eyes, Ears, Mouth, Throat: No vision loss   Respiratory: No shortness of breath or cough.                                Cardiovascular: No chest pain or palpitations  Gastrointestinal: No nausea or vomiting.                                         Genitourinary: No urinary incontinence or burning on urination.  Musculoskeletal: No joint pain.                                                           Dermatologic: No rash.  Neurological: as per HPI                                                                      Psychiatric: No behavioral problems.  Endocrine: No known hypoglycemia.               Hematologic/Lymphatic: No easy bleeding.    O:  Vital Signs Last 24 Hrs  T(C): 36.8 (24 Feb 2024 14:58), Max: 37.8 (23 Feb 2024 23:32)  T(F): 98.2 (24 Feb 2024 14:58), Max: 100.1 (23 Feb 2024 23:32)  HR: 70 (24 Feb 2024 14:58) (70 - 96)  BP: 150/68 (24 Feb 2024 14:58) (145/60 - 156/68)  BP(mean): --  RR: 18 (24 Feb 2024 14:58) (18 - 18)  SpO2: 99% (24 Feb 2024 14:58) (98% - 100%)    Parameters below as of 24 Feb 2024 04:34  Patient On (Oxygen Delivery Method): room air        General Exam:   General appearance: No acute distress                 Cardiovascular: Pedal dorsalis pulses intact bilaterally    Mental Status: Orientated to self, date and place.  Attention intact.  No dysarthria, aphasia or neglect.  Knowledge intact.  Registration intact.  Short and long term memory grossly intact.      Cranial Nerves: CN I - not tested.  PERRL, EOMI, VFF, no nystagmus or diplopia.  No APD.  Fundi not visualized.  CN V1-3 intact to light touch and pinprick.  No facial asymmetry.  Hearing intact to finger rub bilaterally.  Tongue, uvula and palate midline.  Sternocleidomastoid and Trapezius intact bilaterally.    Motor:   Tone: normal.                  Strength intact throughout  No pronator drift bilaterally                      No dysmetria on finger-nose-finger or heel-shin-heel  No truncal ataxia.  No resting, postural or action tremor.  No myoclonus.    Sensation: intact to light touch, pinprick, vibration and proprioception    Deep Tendon Reflexes: 1+ bilateral biceps, triceps, brachioradialis, knee and ankle  Toes flexor bilaterally    Gait: normal and stable.  Rhomberg -radha.    Other:     LABS:                        10.8   11.35 )-----------( 207      ( 24 Feb 2024 05:17 )             33.9     02-24    141  |  113<H>  |  15  ----------------------------<  173<H>  3.3<L>   |  24  |  0.66    Ca    8.3<L>      24 Feb 2024 05:17  Phos  2.0     02-24  Mg     2.0     02-24    TPro  6.5  /  Alb  3.3<L>  /  TBili  0.5  /  DBili  x   /  AST  12  /  ALT  18  /  AlkPhos  63  02-24    PT/INR - ( 23 Feb 2024 12:51 )   PT: 12.5 sec;   INR: 1.10 ratio         PTT - ( 23 Feb 2024 12:51 )  PTT:29.5 sec  Urinalysis Basic - ( 24 Feb 2024 05:17 )    Color: x / Appearance: x / SG: x / pH: x  Gluc: 173 mg/dL / Ketone: x  / Bili: x / Urobili: x   Blood: x / Protein: x / Nitrite: x   Leuk Esterase: x / RBC: x / WBC x   Sq Epi: x / Non Sq Epi: x / Bacteria: x          RADIOLOGY & ADDITIONAL STUDIES:    EKG: < from: 12 Lead ECG (12.30.20 @ 11:28) >    Ventricular Rate 60 BPM    Atrial Rate 60 BPM    P-R Interval 88 ms    QRS Duration 84 ms    Q-T Interval 480 ms    QTC Calculation(Bazett) 480 ms    R Axis -37 degrees    T Axis 5 degrees    Diagnosis Line *** Poor data quality, interpretation maybe adversely affected  Sinus rhythm with short IA  Left axis deviation  Anterior infarct , age undetermined  Abnormal ECG    Confirmed by BESS MYERS MD ([id number]) on 12/31/2020 12:35:28 PM    < end of copied text >  < from: CT Head No Cont (02.23.24 @ 14:39) >    1. Small basal ganglia and thalamic chronic mild deep infarctions    2. Ischemic white matter disease and atrophy typical for age    3. Intracranial atherosclerosis    < end of copied text >       Patient is a 78y old  Female who presents with a chief complaint of Sepsis 2/2 colitis/enteritis (24 Feb 2024 15:42)      HPI:  78 year old Female from [facility] ambulates with a walker AOx3  at baseline with PMHx of schizophrenia on no meds, HTN, HLD, DM, GERD, Mild to moderate vascular dementia, urinary incontinence presents to the ED from NH due to AMS. History is limited due to mental status. Pt denies any complains at this time. AOx1 (name, NAD, comfortable, abdomin with diffuse tenderness to palpation. In ED, VSS, wbc 16, K 3.4, lactate 2.4, CT A/P shows Left ovarian cyst significantly larger than prior. Nonspecific ascending and transverse colitis, either infectious or inflammatory. Fluid-filled small bowel likely enteritis. Stenotic but patent celiac artery with mild poststenotic dilatation. Bladder wall thickening secondary to under distention or cystitis. CTH shows Small basal ganglia and thalamic chronic mild deep infarctions. Ischemic white matter disease and atrophy typical for age. Intracranial atherosclerosis. Pt is admitted for sepsis 2/2 colitis/enteritis.  (23 Feb 2024 17:20)      Neurological Review of Systems:  No difficulty with language.  No vision loss or double vision.  No dizziness, vertigo or new hearing loss.  No difficulty with speech or swallowing.  No focal weakness.  No focal sensory changes.  No difficulty with balance.  No difficulty with ambulation.        MEDICATIONS  (STANDING):  aspirin  chewable 81 milliGRAM(s) Oral daily  atorvastatin 40 milliGRAM(s) Oral at bedtime  cefTRIAXone   IVPB 1000 milliGRAM(s) IV Intermittent every 24 hours  clopidogrel Tablet 75 milliGRAM(s) Oral daily  enoxaparin Injectable 40 milliGRAM(s) SubCutaneous every 24 hours  insulin lispro (ADMELOG) corrective regimen sliding scale   SubCutaneous at bedtime  insulin lispro (ADMELOG) corrective regimen sliding scale   SubCutaneous three times a day before meals  lactated ringers. 1000 milliLiter(s) (75 mL/Hr) IV Continuous <Continuous>  metoprolol succinate ER 25 milliGRAM(s) Oral daily  metroNIDAZOLE  IVPB      metroNIDAZOLE  IVPB 500 milliGRAM(s) IV Intermittent every 8 hours  pantoprazole    Tablet 40 milliGRAM(s) Oral before breakfast    MEDICATIONS  (PRN):  ondansetron Injectable 4 milliGRAM(s) IV Push every 6 hours PRN Vomiting    Allergies    No Known Allergies    Intolerances      PAST MEDICAL & SURGICAL HISTORY:  Schizophrenia      DM (diabetes mellitus)      HTN (hypertension)      HLD (hyperlipidemia)      GERD (gastroesophageal reflux disease)      No significant past surgical history        FAMILY HISTORY:  No pertinent family history in first degree relatives      SOCIAL HISTORY: non smoker    Review of Systems:  Constitutional: No fevers     Eyes, Ears, Mouth, Throat: No vision loss   Respiratory: No  cough.                                Cardiovascular: No chest pain  Gastrointestinal: No vomiting.                                         Genitourinary: No  burning on urination.  Musculoskeletal: No joint pain.                                                           Dermatologic: No rash.  Neurological: as per HPI                                                                      Psychiatric: No behavioral problems.  Endocrine: No known hypoglycemia.               Hematologic/Lymphatic: No easy bleeding.    O:  Vital Signs Last 24 Hrs  T(C): 36.8 (24 Feb 2024 14:58), Max: 37.8 (23 Feb 2024 23:32)  T(F): 98.2 (24 Feb 2024 14:58), Max: 100.1 (23 Feb 2024 23:32)  HR: 70 (24 Feb 2024 14:58) (70 - 96)  BP: 150/68 (24 Feb 2024 14:58) (145/60 - 156/68)  BP(mean): --  RR: 18 (24 Feb 2024 14:58) (18 - 18)  SpO2: 99% (24 Feb 2024 14:58) (98% - 100%)    Parameters below as of 24 Feb 2024 04:34  Patient On (Oxygen Delivery Method): room air        General Exam:   General appearance: No acute distress                 Cardiovascular: Pedal dorsalis pulses intact bilaterally    Mental Status: Orientated to self and date but  not to place.  Attention intact.  No dysarthria, aphasia or neglect.  Knowledge intact.  Registration intact.  Short and long term memory unreliable.    Cranial Nerves: CN I - not tested.  PERRL, EOMI, VFF, no nystagmus or diplopia.  No APD.  Fundi not visualized.  CN V1-3 intact to light touch.  No facial asymmetry.  Hearing intact to finger rub bilaterally.  Tongue, uvula and palate midline.  Sternocleidomastoid and Trapezius intact bilaterally.    Motor:   Tone: normal.                  Strength intact throughout  No pronator drift bilaterally                      No dysmetria on finger-nose-finger or heel-shin-heel  No truncal ataxia.  No resting, postural or action tremor.  No myoclonus.    Sensation: intact to light touch    Deep Tendon Reflexes: 1+ bilateral biceps, triceps, brachioradialis, knee and ankle  Toes flexor bilaterally    Gait: pt declines    Other:     LABS:                        10.8   11.35 )-----------( 207      ( 24 Feb 2024 05:17 )             33.9     02-24    141  |  113<H>  |  15  ----------------------------<  173<H>  3.3<L>   |  24  |  0.66    Ca    8.3<L>      24 Feb 2024 05:17  Phos  2.0     02-24  Mg     2.0     02-24    TPro  6.5  /  Alb  3.3<L>  /  TBili  0.5  /  DBili  x   /  AST  12  /  ALT  18  /  AlkPhos  63  02-24    PT/INR - ( 23 Feb 2024 12:51 )   PT: 12.5 sec;   INR: 1.10 ratio         PTT - ( 23 Feb 2024 12:51 )  PTT:29.5 sec  Urinalysis Basic - ( 24 Feb 2024 05:17 )    Color: x / Appearance: x / SG: x / pH: x  Gluc: 173 mg/dL / Ketone: x  / Bili: x / Urobili: x   Blood: x / Protein: x / Nitrite: x   Leuk Esterase: x / RBC: x / WBC x   Sq Epi: x / Non Sq Epi: x / Bacteria: x          RADIOLOGY & ADDITIONAL STUDIES:    EKG: < from: 12 Lead ECG (12.30.20 @ 11:28) >    Ventricular Rate 60 BPM    Atrial Rate 60 BPM    P-R Interval 88 ms    QRS Duration 84 ms    Q-T Interval 480 ms    QTC Calculation(Bazett) 480 ms    R Axis -37 degrees    T Axis 5 degrees    Diagnosis Line *** Poor data quality, interpretation maybe adversely affected  Sinus rhythm with short OH  Left axis deviation  Anterior infarct , age undetermined  Abnormal ECG    Confirmed by LOUIS BARKSDALE, BESS ([id number]) on 12/31/2020 12:35:28 PM    < end of copied text >  < from: CT Head No Cont (02.23.24 @ 14:39) > (images reviewed)    1. Small basal ganglia and thalamic chronic mild deep infarctions    2. Ischemic white matter disease and atrophy typical for age    3. Intracranial atherosclerosis    < end of copied text >

## 2024-02-24 NOTE — CONSULT NOTE ADULT - ASSESSMENT
Encephalopathy in setting of colitis/enderocolitis in patient with h/o vascular dementia concering for toxic metabolic encephalopathy.    will recommend to continue treatment as per primary team  EEG to r/o sz activity     nely Cooper

## 2024-02-24 NOTE — PATIENT PROFILE ADULT - FALL HARM RISK - HARM RISK INTERVENTIONS

## 2024-02-24 NOTE — PATIENT PROFILE ADULT - PUBLIC BENEFITS
Landy Conklin is a 41 year old female who presents today for   Chief Complaint   Patient presents with   • Shortness of Breath       HPI: Patient presents today for evaluation of left back and buttock pain.  Pain is located in her left with low back and radiates into her buttock and upper thigh.  No acute injury but has been doing more lifting and work around the house.  Previous problems with low back and SI joints and sciatic issues.  Back injury many years ago.  She denies any bowel or bladder changes.  She is currently on amitriptyline as needed at bed.  Ibuprofen as needed throughout the day but limits due to chronic gastritis.  Currently taking Nexium.  No numbness or tingling.      No other health concerns today.    ALLERGIES:   Allergen Reactions   • Compazine NAUSEA       Current Outpatient Prescriptions   Medication Sig Dispense Refill   • gabapentin (NEURONTIN) 300 MG capsule 1 cap 3 times per day as needed for foot pain 90 capsule 1   • predniSONE (DELTASONE) 20 MG tablet 2 tab daily for 3 day,s then 1 tab daily for 7 days, then 1/2 tab daily until gone 15 tablet 0   • albuterol 108 (90 Base) MCG/ACT inhaler Inhale 2 puffs into the lungs every 4 hours as needed for Shortness of Breath or Wheezing. 1 Inhaler 0   • ibuprofen (MOTRIN) 800 MG tablet Take 1 tablet by mouth every 8 hours as needed for Pain. 90 tablet 1   • Linaclotide (LINZESS PO)        No current facility-administered medications for this visit.        Physical Exam:  Visit Vitals  /72   Pulse 83   Temp 97.8 °F (36.6 °C) (Temporal Artery)   Ht 5' 4\" (1.626 m)   Wt 88.9 kg   SpO2 98%   BMI 33.64 kg/m²     General:  41 year old female very pleasant, alert and oriented X3, no acute distress.  Lungs: Clear to auscultation.  Cardiac: Regular rate and rhythm, no murmurs.  Extremities: Pain with flexion at the hip.  No problems with internal/external rotation.  No weakness.  +2 symmetric reflexes in the lower extremities.  Sensation  intact.  Spine: No tenderness to palpation of the lumbar spine.  Tenderness to the left of the spine worse over the left SI joint.  Reproducible radicular symptoms with palpation of the left sciatic notch.  Muscle tightness and bulge noted over the SI joint on the left.    In office testing: None    Assessment:   1. Wheezing    2. Restless legs          Plan: Patient was given symptomatic treatment measures.  Stretches discussed.  Medrol Dosepak for inflammation.  Cyclobenzaprine 5 mg up to 3 times daily as needed for muscle tightness and pain.  Limited supply of 15 hydrocodone given to use when pain gets unbearable.  Recommend referral to physical therapy home exercises are not improving.  Follow up as needed.  If anything worsens or changes she should call or follow up sooner than planned.   All questions and concerns addressed.  Patient deferred mammogram at this time.    Supervising physician: Dr. JOSE RAUL Castro         no

## 2024-02-24 NOTE — PROGRESS NOTE ADULT - SUBJECTIVE AND OBJECTIVE BOX
{\rtf1\bcnzqd00732\ansi\ryfwjsu2495\ftnbj\uc1\deff0  {\fonttbl{\f0 \fnil Segoe UI;}{\f1 \fnil \fcharset0 Segoe UI;}{\f2 \fnil Times New Huan;}}  {\colortbl ;\uux658\vhvgs003\znhw372 ;\red0\green0\blue0 ;\red0\green0\blue0 ;}  {\stylesheet{\f0\fs20 Normal;}{\cs1 Default Paragraph Font;}{\cs2\f0\fs16 Line Number;}{\cs3\f2\fs24 Hyperlink;}}  {\*\revtbl{Unknown;}}  \qsxuqh53341\vpbqge96040\hzizp0439\vldli3501\avxvg8653\kdchr2560\baqozhf371\gtcpmot955\nogrowautofit\hqqnxa202\formshade\nofeaturethrottle1\dntblnsbdb\fet4\aendnotes\aftnnrlc\pgbrdrhead\pgbrdrfoot  \sectd\mqeouz77334\wznzib95682\guttersxn0\ubhhbfjw3505\umbgcixp3893\zbicaaxm3632\grtfelou1439\kylrhfe563\mtcdsls601\sbkpage\pgncont\pgndec  \plain\plain\f0\fs24\pard\plain\f0\fs24\plain\f0\fs20\ndpo8070\hich\f0\dbch\f0\loch\f0\fs20 Patient is a 78y old  Female who presents with a chief complaint of Sepsis 2/2 colitis/enteritis (23 Feb 2024 17:20)\par  \par  PATIENT IS SEEN AND EXAMINED IN MEDICAL FLOOR.\par  \par  NGT [    ]  \par  WEBSTER [   ]    \par  GT [   ]\par  \par  ALLERGIES:  No Known Allergies\par  \par  \par  Daily   \par  Daily \par  \par  VITALS:\par  \par  Vital Signs Last 24 Hrs\par  T(C): 36.8 (24 Feb 2024 14:58), Max: 37.8 (23 Feb 2024 23:32)\par  T(F): 98.2 (24 Feb 2024 14:58), Max: 100.1 (23 Feb 2024 23:32)\par  HR: 70 (24 Feb 2024 14:58) (70 - 96)\par  BP: 150/68 (24 Feb 2024 14:58) (129/88 - 156/68)\par  BP(mean): --\par  RR: 18 (24 Feb 2024 14:58) (18 - 18)\par  SpO2: 99% (24 Feb 2024 14:58) (98% - 100%)\par  \par  Parameters below as of 24 Feb 2024 04:34\par  Patient On (Oxygen Delivery Method): room air\par  \par  \par  \par  LABS:\par  \par  CBC Full  -  ( 24 Feb 2024 05:17 )\par  WBC Count : 11.35 K/uL\par  RBC Count : 3.49 M/uL\par  Hemoglobin : 10.8 g/dL\par  Hematocrit : 33.9 %\par  Platelet Count - Automated : 207 K/uL\par  Mean Cell Volume : 97.1 fl\par  Mean Cell Hemoglobin : 30.9 pg\par  Mean Cell Hemoglobin Concentration : 31.9 gm/dL\par  Auto Neutrophil # : 9.78 K/uL\par  Auto Lymphocyte # : 0.88 K/uL\par  Auto Monocyte # : 0.64 K/uL\par  Auto Eosinophil # : 0.00 K/uL\par  Auto Basophil # : 0.02 K/uL\par  Auto Neutrophil % : 86.1 %\par  Auto Lymphocyte % : 7.8 %\par  Auto Monocyte % : 5.6 %\par  Auto Eosinophil % : 0.0 %\par  Auto Basophil % : 0.2 %\par  \par  PT/INR - ( 23 Feb 2024 12:51 )   PT: 12.5 sec;   INR: 1.10 ratio  \par  \par   \par  PTT - ( 23 Feb 2024 12:51 )  PTT:29.5 sec\par  02-24\par  \par  141  |  113<H>  |  15\par  ----------------------------<  173<H>\par  3.3<L>   |  24  |  0.66\par  \par  Ca    8.3<L>      24 Feb 2024 05:17\par  Phos  2.0     02-24\par  Mg     2.0     02-24\par  \par  TPro  6.5  /  Alb  3.3<L>  /  TBili  0.5  /  DBili  x   /  AST  12  /  ALT  18  /  AlkPhos  63  02-24\par  \par  CAPILLARY BLOOD GLUCOSE\par  \par  \par  POCT Blood Glucose.: 134 mg/dL (24 Feb 2024 12:12)\par  POCT Blood Glucose.: 130 mg/dL (24 Feb 2024 08:33)\par  POCT Blood Glucose.: 174 mg/dL (23 Feb 2024 22:16)\par  \par  \par  \par  LIVER FUNCTIONS - ( 24 Feb 2024 05:17 )\par  Alb: 3.3 g/dL / Pro: 6.5 g/dL / ALK PHOS: 63 U/L / ALT: 18 U/L DA / AST: 12 U/L / GGT: x       \par  \par  Creatinine Trend: 0.66<--, 1.04<--\par  I&O's Summary\par  \par  \par  \par  \par  Clean Catch Clean Catch (Midstream)\par  02-23 @ 13:04 \par  No growth  --  --\par  \par  \par  \par  \par  MEDICATIONS:\par  \par  MEDICATIONS  (STANDING):\par  aspirin  chewable 81 milliGRAM(s) Oral daily\par  atorvastatin 40 milliGRAM(s) Oral at bedtime\par  cefTRIAXone   IVPB 1000 milliGRAM(s) IV Intermittent every 24 hours\par  clopidogrel Tablet 75 milliGRAM(s) Oral daily\par  enoxaparin Injectable 40 milliGRAM(s) SubCutaneous every 24 hours\par  insulin lispro (ADMELOG) corrective regimen sliding scale   SubCutaneous three times a day before meals\par  insulin lispro (ADMELOG) corrective regimen sliding scale   SubCutaneous at bedtime\par  lactated ringers. 1000 milliLiter(s) (75 mL/Hr) IV Continuous <Continuous>\par  metoprolol succinate ER 25 milliGRAM(s) Oral daily\par  metroNIDAZOLE  IVPB    \par  metroNIDAZOLE  IVPB 500 milliGRAM(s) IV Intermittent every 8 hours\par  pantoprazole    Tablet 40 milliGRAM(s) Oral before breakfast\par  \par  \par  MEDICATIONS  (PRN):\par  ondansetron Injectable 4 milliGRAM(s) IV Push every 6 hours PRN Vomiting\par  \par  \par  \par  REVIEW OF SYSTEMS:                           ALL ROS DONE [ X   ]\par  \par  \par  CONSTITUTIONAL:  LETHARGIC [   ], FEVER [   ], UNRESPONSIVE [   ]\par  CVS:  CP  [   ], SOB, [   ], PALPITATIONS [   ], DIZZYNESS [   ]\par  RS: COUGH [   ], SPUTUM [   ]\par  GI: ABDOMINAL PAIN [   ], NAUSEA [   ], VOMITINGS [   ], DIARRHEA [   ], CONSTIPATION [   ]\par  :  DYSURIA [   ], NOCTURIA [   ], INCREASED FREQUENCY [   ], DRIBLING [   ],\par  SKELETAL: PAINFUL JOINTS [   ], SWOLLEN JOINTS [   ], NECK ACHE [   ], LOW BACK ACHE [   ],\par  SKIN : ULCERS [   ], RASH [   ], ITCHING [   ]\par  CNS: HEAD ACHE [   ], DOUBLE VISION [   ], BLURRED VISION [   ], AMS / CONFUSION [   ], SEIZURES [   ], WEAKNESS [   ],TINGLING / NUMBNESS [   ]\par  \par  \par  PHYSICAL EXAMINATION:\par  \par  GENERAL APPEARANCE: NO DISTRESS\par  HEENT:  NO PALLOR, NO  JVD,  NO   NODES, NECK SUPPLE\par  CVS: S1 +, S2 +, \par  RS: AEEB,  OCCASIONAL  RALES +,   NO RONCHI\par  ABD: SOFT, NT, NO, BS +\par  EXT: NO PE\par  SKIN: WARM, \par  SKELETAL:  ROM ACCEPTABLE\par  CNS:  AAO X    ,   DEFICITS\par  \par  \par  \par  RADIOLOGY :\par  \par  \par  \par  \par  ASSESSMENT : \par  \par  Noninfectious gastroenteritis\par  \par  Schizophrenia\par  \par  DM (diabetes mellitus)\par  \par  HTN (hypertension)\par  \par  HLD (hyperlipidemia)\par  \par  GERD (gastroesophageal reflux disease)\par  \par  \par  \par  PLAN:\par  HPI:\par  78 year old Female from [facility] ambulates with a walker AOx3  at baseline with PMHx of schizophrenia on no meds, HTN, HLD, DM, GERD, Mild to moderate vascular dementia, urinary incontinence presents to the ED from NH due to AMS. History is limited   due to mental status. Pt denies any complains at this time. AOx1 (name, NAD, comfortable, abdomin with diffuse tenderness to palpation. In ED, VSS, wbc 16, K 3.4, lactate 2.4, CT A/P shows Left ovarian cyst significantly larger than prior. Nonspecific   ascending and transverse colitis, either infectious or inflammatory. Fluid-filled small bowel likely enteritis. Stenotic but patent celiac artery with mild poststenotic dilatation. Bladder wall thickening secondary to under distention or cystitis. CTH   shows Small basal ganglia and thalamic chronic mild deep infarctions. Ischemic white matter disease and atrophy typical for age. Intracranial atherosclerosis. Pt is admitted for sepsis 2/2 colitis/enteritis.  (23 Feb 2024 17:20)\par  \par  \par  \ql\plain\f0\fs24\plain\f0\fs20\bcgt0185\hich\f0\dbch\f0\loch\f0\fs20 # SEPSIS S/T ACUTE COLITIS, ACUTE GASTROENTERITIS, ACUTE CORONAVIRUS INFECTION\par  \par  # ACUTE URTI, ACUTE RSV INFECTION\par  \par  # LIKELY UTI\par  - PLACED ON ROCEPHIN + FLAGYLL, F/U BCX AND UCX\par  - NOTED CT A/P\par  - CLEAR LIQUID DIET / DIET AS TOLERATED\par  \par  \par  # ACUTE ENCEPHALOPATHY ON ADVANCED VASCULAR DEMENTIA\par  # ? TIA\par  - NOTED CT HEAD\par  - ASA, PLAVIX, STATIN\par  - F/U TSH/LIPIDS/HBA1C\par  - F/U ECHOCARDIOGRAM W/ BUBBLE STUDY\par  - PASSED BEDSIDE SWALLOW\par  - F/U PT EVAL\par  - NOTED CT HEAD\par  - NEUROLOGY CONSULT\par  \par  # ELEVATED LACTIC ACID\par  - TREND LACTATE\par  \par  # IMPAIRED GAIT DUE TO GENERALIZED MUSCLE WEAKNESS\par  \par  # LEFT OVARIAN CYST\par  - F/U TVUS AND PELVIC US\par  - GYN CONSULT\par  \par  # CELIAC ARTERY STENOSIS\par  - VASCULAR CONSULT PLACED\par  \par  # HTN\par  # HLD\par  # DM, DIABETIC RETINOPATHY, DIABETIC PERIPHERAL NEUROPATHY\par  # GERD\par  # GI AND DVT PPX. \plain\f1\fs20\[id number]\hich\f1\dbch\f1\loch\f1\cf2\fs20\strike\plain\f1\fs16\iwuj8357\hich\f1\dbch\f1\loch\f1\cf2\fs16\strike\plain\f1\fs16\pamt0121\hich\f1\dbch\f1\loch\f1\cf2\fs16\plain\f0\fs20\djah6441\hich\f0\dbch\f0\loch\f0\fs20\par  DR. MARIA RODRIGUEZU\par  \pard\plain\f0\fs24\plain\f0\fs20\icoc0087\hich\f0\dbch\f0\loch\f0\fs20\par  \ql\plain\f0\fs24\plain\f0\fs20\hafe6013\hich\f0\dbch\f0\loch\f0\fs20\par  }

## 2024-02-25 LAB
ALBUMIN SERPL ELPH-MCNC: 3.2 G/DL — LOW (ref 3.5–5)
ALP SERPL-CCNC: 60 U/L — SIGNIFICANT CHANGE UP (ref 40–120)
ALT FLD-CCNC: 47 U/L DA — SIGNIFICANT CHANGE UP (ref 10–60)
ANION GAP SERPL CALC-SCNC: 5 MMOL/L — SIGNIFICANT CHANGE UP (ref 5–17)
AST SERPL-CCNC: 44 U/L — HIGH (ref 10–40)
BASOPHILS # BLD AUTO: 0.02 K/UL — SIGNIFICANT CHANGE UP (ref 0–0.2)
BASOPHILS NFR BLD AUTO: 0.3 % — SIGNIFICANT CHANGE UP (ref 0–2)
BILIRUB SERPL-MCNC: 0.5 MG/DL — SIGNIFICANT CHANGE UP (ref 0.2–1.2)
BUN SERPL-MCNC: 8 MG/DL — SIGNIFICANT CHANGE UP (ref 7–18)
CALCIUM SERPL-MCNC: 8.5 MG/DL — SIGNIFICANT CHANGE UP (ref 8.4–10.5)
CHLORIDE SERPL-SCNC: 109 MMOL/L — HIGH (ref 96–108)
CO2 SERPL-SCNC: 24 MMOL/L — SIGNIFICANT CHANGE UP (ref 22–31)
CREAT SERPL-MCNC: 0.66 MG/DL — SIGNIFICANT CHANGE UP (ref 0.5–1.3)
EGFR: 89 ML/MIN/1.73M2 — SIGNIFICANT CHANGE UP
EOSINOPHIL # BLD AUTO: 0.07 K/UL — SIGNIFICANT CHANGE UP (ref 0–0.5)
EOSINOPHIL NFR BLD AUTO: 0.9 % — SIGNIFICANT CHANGE UP (ref 0–6)
GI PCR PANEL: DETECTED
GLUCOSE BLDC GLUCOMTR-MCNC: 104 MG/DL — HIGH (ref 70–99)
GLUCOSE BLDC GLUCOMTR-MCNC: 111 MG/DL — HIGH (ref 70–99)
GLUCOSE BLDC GLUCOMTR-MCNC: 124 MG/DL — HIGH (ref 70–99)
GLUCOSE BLDC GLUCOMTR-MCNC: 128 MG/DL — HIGH (ref 70–99)
GLUCOSE SERPL-MCNC: 110 MG/DL — HIGH (ref 70–99)
HCT VFR BLD CALC: 34.9 % — SIGNIFICANT CHANGE UP (ref 34.5–45)
HGB BLD-MCNC: 11.2 G/DL — LOW (ref 11.5–15.5)
IMM GRANULOCYTES NFR BLD AUTO: 0.3 % — SIGNIFICANT CHANGE UP (ref 0–0.9)
LACTATE SERPL-SCNC: 1.6 MMOL/L — SIGNIFICANT CHANGE UP (ref 0.7–2)
LYMPHOCYTES # BLD AUTO: 1.39 K/UL — SIGNIFICANT CHANGE UP (ref 1–3.3)
LYMPHOCYTES # BLD AUTO: 18 % — SIGNIFICANT CHANGE UP (ref 13–44)
MAGNESIUM SERPL-MCNC: 2 MG/DL — SIGNIFICANT CHANGE UP (ref 1.6–2.6)
MCHC RBC-ENTMCNC: 30.8 PG — SIGNIFICANT CHANGE UP (ref 27–34)
MCHC RBC-ENTMCNC: 32.1 GM/DL — SIGNIFICANT CHANGE UP (ref 32–36)
MCV RBC AUTO: 95.9 FL — SIGNIFICANT CHANGE UP (ref 80–100)
MONOCYTES # BLD AUTO: 0.8 K/UL — SIGNIFICANT CHANGE UP (ref 0–0.9)
MONOCYTES NFR BLD AUTO: 10.3 % — SIGNIFICANT CHANGE UP (ref 2–14)
NEUTROPHILS # BLD AUTO: 5.44 K/UL — SIGNIFICANT CHANGE UP (ref 1.8–7.4)
NEUTROPHILS NFR BLD AUTO: 70.2 % — SIGNIFICANT CHANGE UP (ref 43–77)
NOROVIRUS GI+II RNA STL QL NAA+NON-PROBE: DETECTED
NRBC # BLD: 0 /100 WBCS — SIGNIFICANT CHANGE UP (ref 0–0)
PHOSPHATE SERPL-MCNC: 2.1 MG/DL — LOW (ref 2.5–4.5)
PLATELET # BLD AUTO: 176 K/UL — SIGNIFICANT CHANGE UP (ref 150–400)
POTASSIUM SERPL-MCNC: 3.3 MMOL/L — LOW (ref 3.5–5.3)
POTASSIUM SERPL-SCNC: 3.3 MMOL/L — LOW (ref 3.5–5.3)
PROT SERPL-MCNC: 6.3 G/DL — SIGNIFICANT CHANGE UP (ref 6–8.3)
RBC # BLD: 3.64 M/UL — LOW (ref 3.8–5.2)
RBC # FLD: 12.5 % — SIGNIFICANT CHANGE UP (ref 10.3–14.5)
SODIUM SERPL-SCNC: 138 MMOL/L — SIGNIFICANT CHANGE UP (ref 135–145)
WBC # BLD: 7.74 K/UL — SIGNIFICANT CHANGE UP (ref 3.8–10.5)
WBC # FLD AUTO: 7.74 K/UL — SIGNIFICANT CHANGE UP (ref 3.8–10.5)

## 2024-02-25 RX ORDER — POTASSIUM CHLORIDE 20 MEQ
40 PACKET (EA) ORAL ONCE
Refills: 0 | Status: COMPLETED | OUTPATIENT
Start: 2024-02-25 | End: 2024-02-25

## 2024-02-25 RX ORDER — POTASSIUM CHLORIDE 20 MEQ
20 PACKET (EA) ORAL
Refills: 0 | Status: DISCONTINUED | OUTPATIENT
Start: 2024-02-25 | End: 2024-02-27

## 2024-02-25 RX ADMIN — CLOPIDOGREL BISULFATE 75 MILLIGRAM(S): 75 TABLET, FILM COATED ORAL at 12:44

## 2024-02-25 RX ADMIN — CEFTRIAXONE 100 MILLIGRAM(S): 500 INJECTION, POWDER, FOR SOLUTION INTRAMUSCULAR; INTRAVENOUS at 21:01

## 2024-02-25 RX ADMIN — Medication 20 MILLIEQUIVALENT(S): at 17:13

## 2024-02-25 RX ADMIN — Medication 20 MILLIEQUIVALENT(S): at 14:31

## 2024-02-25 RX ADMIN — Medication 100 MILLIGRAM(S): at 21:38

## 2024-02-25 RX ADMIN — ATORVASTATIN CALCIUM 40 MILLIGRAM(S): 80 TABLET, FILM COATED ORAL at 21:01

## 2024-02-25 RX ADMIN — Medication 40 MILLIEQUIVALENT(S): at 08:21

## 2024-02-25 RX ADMIN — Medication 81 MILLIGRAM(S): at 12:44

## 2024-02-25 RX ADMIN — PANTOPRAZOLE SODIUM 40 MILLIGRAM(S): 20 TABLET, DELAYED RELEASE ORAL at 05:41

## 2024-02-25 RX ADMIN — ENOXAPARIN SODIUM 40 MILLIGRAM(S): 100 INJECTION SUBCUTANEOUS at 05:41

## 2024-02-25 RX ADMIN — Medication 25 MILLIGRAM(S): at 05:41

## 2024-02-25 RX ADMIN — Medication 100 MILLIGRAM(S): at 14:31

## 2024-02-25 RX ADMIN — Medication 100 MILLIGRAM(S): at 05:46

## 2024-02-25 NOTE — PROVIDER CONTACT NOTE (CRITICAL VALUE NOTIFICATION) - SITUATION
Miya Wiley from [facility] called for a critical GI PCR detected positive for Norovirus. Bruce LOCKE made aware

## 2024-02-25 NOTE — PROGRESS NOTE ADULT - SUBJECTIVE AND OBJECTIVE BOX
Patient is a 78y old  Female who presents with a chief complaint of Sepsis 2/2 colitis/enteritis (24 Feb 2024 17:34)    PATIENT IS SEEN AND EXAMINED IN MEDICAL FLOOR.    MIREILLE [    ]    DARIN [   ]      GT [   ]    ALLERGIES:  No Known Allergies      Daily     Daily     VITALS:    Vital Signs Last 24 Hrs  T(C): 36.8 (25 Feb 2024 13:21), Max: 37.2 (25 Feb 2024 00:59)  T(F): 98.2 (25 Feb 2024 13:21), Max: 99 (25 Feb 2024 00:59)  HR: 65 (25 Feb 2024 13:21) (55 - 74)  BP: 150/68 (25 Feb 2024 13:21) (124/57 - 156/65)  BP(mean): --  RR: 18 (25 Feb 2024 13:21) (18 - 18)  SpO2: 100% (25 Feb 2024 13:21) (94% - 100%)    Parameters below as of 25 Feb 2024 13:21  Patient On (Oxygen Delivery Method): room air        LABS:    CBC Full  -  ( 25 Feb 2024 05:25 )  WBC Count : 7.74 K/uL  RBC Count : 3.64 M/uL  Hemoglobin : 11.2 g/dL  Hematocrit : 34.9 %  Platelet Count - Automated : 176 K/uL  Mean Cell Volume : 95.9 fl  Mean Cell Hemoglobin : 30.8 pg  Mean Cell Hemoglobin Concentration : 32.1 gm/dL  Auto Neutrophil # : 5.44 K/uL  Auto Lymphocyte # : 1.39 K/uL  Auto Monocyte # : 0.80 K/uL  Auto Eosinophil # : 0.07 K/uL  Auto Basophil # : 0.02 K/uL  Auto Neutrophil % : 70.2 %  Auto Lymphocyte % : 18.0 %  Auto Monocyte % : 10.3 %  Auto Eosinophil % : 0.9 %  Auto Basophil % : 0.3 %      02-25    138  |  109<H>  |  8   ----------------------------<  110<H>  3.3<L>   |  24  |  0.66    Ca    8.5      25 Feb 2024 05:25  Phos  2.1     02-25  Mg     2.0     02-25    TPro  6.3  /  Alb  3.2<L>  /  TBili  0.5  /  DBili  x   /  AST  44<H>  /  ALT  47  /  AlkPhos  60  02-25    CAPILLARY BLOOD GLUCOSE      POCT Blood Glucose.: 128 mg/dL (25 Feb 2024 11:49)  POCT Blood Glucose.: 111 mg/dL (25 Feb 2024 07:49)  POCT Blood Glucose.: 104 mg/dL (24 Feb 2024 22:02)  POCT Blood Glucose.: 118 mg/dL (24 Feb 2024 18:46)        LIVER FUNCTIONS - ( 25 Feb 2024 05:25 )  Alb: 3.2 g/dL / Pro: 6.3 g/dL / ALK PHOS: 60 U/L / ALT: 47 U/L DA / AST: 44 U/L / GGT: x           Creatinine Trend: 0.66<--, 0.66<--, 1.04<--  I&O's Summary    24 Feb 2024 07:01  -  25 Feb 2024 07:00  --------------------------------------------------------  IN: 0 mL / OUT: 700 mL / NET: -700 mL            Catheterized Catheterized  02-23 @ 19:30   No growth  --  --      Clean Catch Clean Catch (Midstream)  02-23 @ 13:04   No growth  --  --      .Blood Blood-Peripheral  02-23 @ 12:51   No growth at 24 hours  --  --          MEDICATIONS:    MEDICATIONS  (STANDING):  aspirin  chewable 81 milliGRAM(s) Oral daily  atorvastatin 40 milliGRAM(s) Oral at bedtime  cefTRIAXone   IVPB 1000 milliGRAM(s) IV Intermittent every 24 hours  clopidogrel Tablet 75 milliGRAM(s) Oral daily  enoxaparin Injectable 40 milliGRAM(s) SubCutaneous every 24 hours  insulin lispro (ADMELOG) corrective regimen sliding scale   SubCutaneous three times a day before meals  insulin lispro (ADMELOG) corrective regimen sliding scale   SubCutaneous at bedtime  lactated ringers. 1000 milliLiter(s) (75 mL/Hr) IV Continuous <Continuous>  metoprolol succinate ER 25 milliGRAM(s) Oral daily  metroNIDAZOLE  IVPB      metroNIDAZOLE  IVPB 500 milliGRAM(s) IV Intermittent every 8 hours  pantoprazole    Tablet 40 milliGRAM(s) Oral before breakfast  potassium chloride    Tablet ER 20 milliEquivalent(s) Oral two times a day      MEDICATIONS  (PRN):  ondansetron Injectable 4 milliGRAM(s) IV Push every 6 hours PRN Vomiting        REVIEW OF SYSTEMS:                           ALL ROS DONE [ X   ]      CONSTITUTIONAL:  LETHARGIC [   ], FEVER [   ], UNRESPONSIVE [   ]  CVS:  CP  [   ], SOB, [   ], PALPITATIONS [   ], DIZZYNESS [   ]  RS: COUGH [   ], SPUTUM [   ]  GI: ABDOMINAL PAIN [   ], NAUSEA [   ], VOMITINGS [   ], DIARRHEA [   ], CONSTIPATION [   ]  :  DYSURIA [   ], NOCTURIA [   ], INCREASED FREQUENCY [   ], DRIBLING [   ],  SKELETAL: PAINFUL JOINTS [   ], SWOLLEN JOINTS [   ], NECK ACHE [   ], LOW BACK ACHE [   ],  SKIN : ULCERS [   ], RASH [   ], ITCHING [   ]  CNS: HEAD ACHE [   ], DOUBLE VISION [   ], BLURRED VISION [   ], AMS / CONFUSION [   ], SEIZURES [   ], WEAKNESS [   ],TINGLING / NUMBNESS [   ]        PHYSICAL EXAMINATION:    GENERAL APPEARANCE: NO DISTRESS  HEENT:  NO PALLOR, NO  JVD,  NO   NODES, NECK SUPPLE  CVS: S1 +, S2 +,   RS: AEEB,  OCCASIONAL  RALES +,   NO RONCHI  ABD: SOFT, NT, NO, BS +  EXT: NO PE  SKIN: WARM,   SKELETAL:  ROM ACCEPTABLE  CNS:  AAO X 2, CONFUSED, NO DEFICITS        RADIOLOGY :    < from: CT Abdomen and Pelvis w/ IV Cont (02.23.24 @ 14:40) >  IMPRESSION:  Left ovarian cyst significantly larger than prior. Recommend pelvic   ultrasound and GYN evaluation.    Nonspecific ascending and transverse colitis, either infectious or   inflammatory. Fluid-filled small bowel likely enteritis. Recommend   follow-up to ensure resolution.    Stenotic but patent celiac artery with mild poststenotic dilatation.    Bladder wall thickening secondary to under distention or cystitis.    < end of copied text >        ASSESSMENT :     Noninfectious gastroenteritis    Schizophrenia    DM (diabetes mellitus)    HTN (hypertension)    HLD (hyperlipidemia)    GERD (gastroesophageal reflux disease)        PLAN:  HPI:  78 year old Female from [facility] ambulates with a walker AOx3  at baseline with PMHx of schizophrenia on no meds, HTN, HLD, DM, GERD, Mild to moderate vascular dementia, urinary incontinence presents to the ED from NH due to AMS. History is limited due to mental status. Pt denies any complains at this time. AOx1 (name, NAD, comfortable, abdomin with diffuse tenderness to palpation. In ED, VSS, wbc 16, K 3.4, lactate 2.4, CT A/P shows Left ovarian cyst significantly larger than prior. Nonspecific ascending and transverse colitis, either infectious or inflammatory. Fluid-filled small bowel likely enteritis. Stenotic but patent celiac artery with mild poststenotic dilatation. Bladder wall thickening secondary to under distention or cystitis. CTH shows Small basal ganglia and thalamic chronic mild deep infarctions. Ischemic white matter disease and atrophy typical for age. Intracranial atherosclerosis. Pt is admitted for sepsis 2/2 colitis/enteritis.  (23 Feb 2024 17:20)        # DC PLAN BACK TO [facility] AFTER PT EVALUATION (ORDERED ON 02/25/24)  - F/UP  - ORDERED ON 02/25/24      # SEPSIS S/T ACUTE COLITIS, ACUTE GASTROENTERITIS, ACUTE CORONAVIRUS INFECTION, ACUTE NOROVIRUS INFECTION WITH GASTROENTERITIS    # ACUTE URTI, ACUTE RSV INFECTION    # LIKELY UTI  - PLACED ON ROCEPHIN + FLAGYL     -   BCX AND UCX - REVIEWED - NGTD  - NOTED CT A/P  - CLEAR LIQUID DIET / ADVANCE DIET AS TOLERATED    # HYPOKALEMIA - ADDED KCL     # ACUTE ENCEPHALOPATHY ON ADVANCED VASCULAR DEMENTIA  # ? TIA  - NOTED CT HEAD  - ASA, PLAVIX, STATIN  - F/U TSH/LIPIDS/HBA1C  - F/U ECHOCARDIOGRAM W/ BUBBLE STUDY  - PASSED BEDSIDE SWALLOW  - F/U PT EVAL  - NOTED CT HEAD  - NEUROLOGY CONSULT    # ELEVATED LACTIC ACID  - TREND LACTATE    # IMPAIRED GAIT DUE TO GENERALIZED MUSCLE WEAKNESS    # LEFT OVARIAN CYST -  ORDERED   - F/U TVUS AND PELVIC US  - GYN CONSULT    # CELIAC ARTERY STENOSIS  - VASCULAR CONSULT PLACED    # HTN  # HLD  # DM, DIABETIC RETINOPATHY, DIABETIC PERIPHERAL NEUROPATHY  # GERD  # GI AND DVT PPX.     DR. MARIA MCCLAIN

## 2024-02-25 NOTE — CHART NOTE - NSCHARTNOTEFT_GEN_A_CORE
Medicine NP note     Notified by RN at 8 ; 38 pm that patient is agitated and wants to pull out Cam catheter , patient is unable to distract , non secured mittens ordered ,however after in one hour RN reported that patient was able to removed them and attempted to pulled out Cam catheter again. Haldol 1 mg IM single dose ordered, patient calmed out , felt asleep, but easily arousable to voice and alert and responding to questions and pleasant ,  Dr. Barbara Knight notified , will monitor pt . Restrains ( no secured mittens d/c)    Phyllis France, NP-C  Department of Medicine- [facility]

## 2024-02-26 DIAGNOSIS — N83.209 UNSPECIFIED OVARIAN CYST, UNSPECIFIED SIDE: ICD-10-CM

## 2024-02-26 DIAGNOSIS — A08.11 ACUTE GASTROENTEROPATHY DUE TO NORWALK AGENT: ICD-10-CM

## 2024-02-26 DIAGNOSIS — Z02.9 ENCOUNTER FOR ADMINISTRATIVE EXAMINATIONS, UNSPECIFIED: ICD-10-CM

## 2024-02-26 LAB
ALBUMIN SERPL ELPH-MCNC: 3 G/DL — LOW (ref 3.5–5)
ALP SERPL-CCNC: 60 U/L — SIGNIFICANT CHANGE UP (ref 40–120)
ALT FLD-CCNC: 88 U/L DA — HIGH (ref 10–60)
ANION GAP SERPL CALC-SCNC: 8 MMOL/L — SIGNIFICANT CHANGE UP (ref 5–17)
AST SERPL-CCNC: 102 U/L — HIGH (ref 10–40)
BASOPHILS # BLD AUTO: 0.01 K/UL — SIGNIFICANT CHANGE UP (ref 0–0.2)
BASOPHILS NFR BLD AUTO: 0.2 % — SIGNIFICANT CHANGE UP (ref 0–2)
BILIRUB SERPL-MCNC: 0.5 MG/DL — SIGNIFICANT CHANGE UP (ref 0.2–1.2)
BUN SERPL-MCNC: 9 MG/DL — SIGNIFICANT CHANGE UP (ref 7–18)
CALCIUM SERPL-MCNC: 9 MG/DL — SIGNIFICANT CHANGE UP (ref 8.4–10.5)
CHLORIDE SERPL-SCNC: 109 MMOL/L — HIGH (ref 96–108)
CO2 SERPL-SCNC: 21 MMOL/L — LOW (ref 22–31)
CREAT SERPL-MCNC: 0.72 MG/DL — SIGNIFICANT CHANGE UP (ref 0.5–1.3)
EGFR: 84 ML/MIN/1.73M2 — SIGNIFICANT CHANGE UP
EOSINOPHIL # BLD AUTO: 0.13 K/UL — SIGNIFICANT CHANGE UP (ref 0–0.5)
EOSINOPHIL NFR BLD AUTO: 2 % — SIGNIFICANT CHANGE UP (ref 0–6)
GLUCOSE BLDC GLUCOMTR-MCNC: 111 MG/DL — HIGH (ref 70–99)
GLUCOSE BLDC GLUCOMTR-MCNC: 121 MG/DL — HIGH (ref 70–99)
GLUCOSE BLDC GLUCOMTR-MCNC: 137 MG/DL — HIGH (ref 70–99)
GLUCOSE BLDC GLUCOMTR-MCNC: 146 MG/DL — HIGH (ref 70–99)
GLUCOSE SERPL-MCNC: 131 MG/DL — HIGH (ref 70–99)
HCT VFR BLD CALC: 34.5 % — SIGNIFICANT CHANGE UP (ref 34.5–45)
HGB BLD-MCNC: 10.9 G/DL — LOW (ref 11.5–15.5)
IMM GRANULOCYTES NFR BLD AUTO: 0.2 % — SIGNIFICANT CHANGE UP (ref 0–0.9)
LYMPHOCYTES # BLD AUTO: 1.66 K/UL — SIGNIFICANT CHANGE UP (ref 1–3.3)
LYMPHOCYTES # BLD AUTO: 25.6 % — SIGNIFICANT CHANGE UP (ref 13–44)
MAGNESIUM SERPL-MCNC: 2.1 MG/DL — SIGNIFICANT CHANGE UP (ref 1.6–2.6)
MCHC RBC-ENTMCNC: 30.7 PG — SIGNIFICANT CHANGE UP (ref 27–34)
MCHC RBC-ENTMCNC: 31.6 GM/DL — LOW (ref 32–36)
MCV RBC AUTO: 97.2 FL — SIGNIFICANT CHANGE UP (ref 80–100)
MONOCYTES # BLD AUTO: 0.55 K/UL — SIGNIFICANT CHANGE UP (ref 0–0.9)
MONOCYTES NFR BLD AUTO: 8.5 % — SIGNIFICANT CHANGE UP (ref 2–14)
NEUTROPHILS # BLD AUTO: 4.12 K/UL — SIGNIFICANT CHANGE UP (ref 1.8–7.4)
NEUTROPHILS NFR BLD AUTO: 63.5 % — SIGNIFICANT CHANGE UP (ref 43–77)
NRBC # BLD: 0 /100 WBCS — SIGNIFICANT CHANGE UP (ref 0–0)
PHOSPHATE SERPL-MCNC: 2 MG/DL — LOW (ref 2.5–4.5)
PLATELET # BLD AUTO: 190 K/UL — SIGNIFICANT CHANGE UP (ref 150–400)
POTASSIUM SERPL-MCNC: 3.8 MMOL/L — SIGNIFICANT CHANGE UP (ref 3.5–5.3)
POTASSIUM SERPL-SCNC: 3.8 MMOL/L — SIGNIFICANT CHANGE UP (ref 3.5–5.3)
PROT SERPL-MCNC: 6.3 G/DL — SIGNIFICANT CHANGE UP (ref 6–8.3)
RBC # BLD: 3.55 M/UL — LOW (ref 3.8–5.2)
RBC # FLD: 12.3 % — SIGNIFICANT CHANGE UP (ref 10.3–14.5)
SODIUM SERPL-SCNC: 138 MMOL/L — SIGNIFICANT CHANGE UP (ref 135–145)
WBC # BLD: 6.48 K/UL — SIGNIFICANT CHANGE UP (ref 3.8–10.5)
WBC # FLD AUTO: 6.48 K/UL — SIGNIFICANT CHANGE UP (ref 3.8–10.5)

## 2024-02-26 PROCEDURE — 95816 EEG AWAKE AND DROWSY: CPT | Mod: 26

## 2024-02-26 RX ORDER — POTASSIUM PHOSPHATE, MONOBASIC POTASSIUM PHOSPHATE, DIBASIC 236; 224 MG/ML; MG/ML
30 INJECTION, SOLUTION INTRAVENOUS ONCE
Refills: 0 | Status: COMPLETED | OUTPATIENT
Start: 2024-02-26 | End: 2024-02-26

## 2024-02-26 RX ADMIN — Medication 81 MILLIGRAM(S): at 11:29

## 2024-02-26 RX ADMIN — Medication 20 MILLIEQUIVALENT(S): at 17:35

## 2024-02-26 RX ADMIN — POTASSIUM PHOSPHATE, MONOBASIC POTASSIUM PHOSPHATE, DIBASIC 83.33 MILLIMOLE(S): 236; 224 INJECTION, SOLUTION INTRAVENOUS at 11:29

## 2024-02-26 RX ADMIN — CEFTRIAXONE 100 MILLIGRAM(S): 500 INJECTION, POWDER, FOR SOLUTION INTRAMUSCULAR; INTRAVENOUS at 21:49

## 2024-02-26 RX ADMIN — Medication 100 MILLIGRAM(S): at 21:49

## 2024-02-26 RX ADMIN — Medication 100 MILLIGRAM(S): at 14:28

## 2024-02-26 RX ADMIN — Medication 100 MILLIGRAM(S): at 05:06

## 2024-02-26 RX ADMIN — Medication 25 MILLIGRAM(S): at 05:07

## 2024-02-26 RX ADMIN — PANTOPRAZOLE SODIUM 40 MILLIGRAM(S): 20 TABLET, DELAYED RELEASE ORAL at 08:03

## 2024-02-26 RX ADMIN — CLOPIDOGREL BISULFATE 75 MILLIGRAM(S): 75 TABLET, FILM COATED ORAL at 11:29

## 2024-02-26 RX ADMIN — ENOXAPARIN SODIUM 40 MILLIGRAM(S): 100 INJECTION SUBCUTANEOUS at 06:09

## 2024-02-26 RX ADMIN — Medication 20 MILLIEQUIVALENT(S): at 05:07

## 2024-02-26 RX ADMIN — ATORVASTATIN CALCIUM 40 MILLIGRAM(S): 80 TABLET, FILM COATED ORAL at 21:49

## 2024-02-26 NOTE — EEG REPORT - NS EEG TEXT BOX
EFRAIN DUNCAN [id number]     Study Date: 	 02-26-24  Duration x Hours: 26 m  --------------------------------------------------------------------------------------------------  History:  CC/ HPI Patient is a 78y old  Female who presents with a chief complaint of Noninfectious gastroenteritis     (26 Feb 2024 12:40)    MEDICATIONS  (STANDING):  aspirin  chewable 81 milliGRAM(s) Oral daily  atorvastatin 40 milliGRAM(s) Oral at bedtime  cefTRIAXone   IVPB 1000 milliGRAM(s) IV Intermittent every 24 hours  clopidogrel Tablet 75 milliGRAM(s) Oral daily  enoxaparin Injectable 40 milliGRAM(s) SubCutaneous every 24 hours  insulin lispro (ADMELOG) corrective regimen sliding scale   SubCutaneous three times a day before meals  insulin lispro (ADMELOG) corrective regimen sliding scale   SubCutaneous at bedtime  lactated ringers. 1000 milliLiter(s) (75 mL/Hr) IV Continuous <Continuous>  metoprolol succinate ER 25 milliGRAM(s) Oral daily  metroNIDAZOLE  IVPB      metroNIDAZOLE  IVPB 500 milliGRAM(s) IV Intermittent every 8 hours  pantoprazole    Tablet 40 milliGRAM(s) Oral before breakfast  potassium chloride    Tablet ER 20 milliEquivalent(s) Oral two times a day    --------------------------------------------------------------------------------------------------  Study Interpretation:    [[[Abbreviation Key:  PDR=alpha rhythm/posterior dominant rhythm. A-P=anterior posterior.  Amplitude: ‘very low’:<20; ‘low’:20-49; ‘medium’:; ‘high’:>150uV.  Persistence for periodic/rhythmic patterns (% of epoch) ‘rare’:<1%; ‘occasional’:1-10%; ‘frequent’:10-50%; ‘abundant’:50-90%; ‘continuous’:>90%.  Persistence for sporadic discharges: ‘rare’:<1/hr; ‘occasional’:1/min-1/hr; ‘frequent’:>1/min; ‘abundant’:>1/10 sec.  RPP=rhythmic and periodic patterns; GRDA=generalized rhythmic delta activity; FIRDA=frontal intermittent GRDA; LRDA=lateralized rhythmic delta activity; TIRDA=temporal intermittent rhythmic delta activity;  LPD=PLED=lateralized periodic discharges; GPD=generalized periodic discharges; BIPDs =bilateral independent periodic discharges; Mf=multifocal; SIRPDs=stimulus induced rhythmic, periodic, or ictal appearing discharges; BIRDs=brief potentially ictal rhythmic discharges >4 Hz, lasting .5-10s; PFA (paroxysmal bursts >13 Hz or =8 Hz <10s).  Modifiers: +F=with fast component; +S=with spike component; +R=with rhythmic component.  S-B=burst suppression pattern.  Max=maximal. N1-drowsy; N2-stage II sleep; N3-slow wave sleep. SSS/BETS=small sharp spikes/benign epileptiform transients of sleep. HV=hyperventilation; PS=photic stimulation]]]    Daily EEG Visual Analysis    FINDINGS:      Background:  Continuity: continuous  Symmetry: symmetric  PDR: 7.5 Hz activity, with amplitude to 40 uV, that attenuated to eye opening.  Low amplitude frontal beta noted in wakefulness.  Reactivity: present  Voltage: normal, mostly 20-150uV  Anterior Posterior Gradient: present  Other background findings: none  Breach: absent    Background Slowing:  Generalized slowing: Diffuse polymorphic theta activity  Focal slowing: none was present.    State Changes:   -Drowsiness and N2 sleep transients were not recorded.    Sporadic Epileptiform Discharges:    None    Rhythmic and Periodic Patterns (RPPs):  None     Electrographic and Electroclinical seizures:  None    Other Clinical Events:  None    Activation Procedures:   -Hyperventilation was not performed.    -Photic stimulation was performed and did not elicit any abnormalities.      Artifacts:  Intermittent myogenic and movement artifacts were noted.    ECG:  The heart rate on single channel ECG was predominantly between 60 to 80 BPM.    EEG Classification / Summary:  Abnormal  EEG in the awake state  - Background slowing, generalized, mild.    Clinical Impression:  - Mild diffuse cerebral dysfunction is nonspecific in etiology.  There were no epileptiform abnormalities recorded.      This is fellow preliminary read, pending attending review.  -------------------------------------------------------------------------------------------------------  [facility] EEG Reading Room Ph#: [phone]  Epilepsy Answering Service after 5PM and before 8:30AM: Ph#: [phone]    JERMAINE Rojo  Epilepsy Fellow   EFRAIN DUNCAN [id number]     Study Date: 	 02-26-24  Duration x Hours: 26 m  --------------------------------------------------------------------------------------------------  History:  CC/ HPI Patient is a 78y old  Female who presents with a chief complaint of Noninfectious gastroenteritis     (26 Feb 2024 12:40)    MEDICATIONS  (STANDING):  aspirin  chewable 81 milliGRAM(s) Oral daily  atorvastatin 40 milliGRAM(s) Oral at bedtime  cefTRIAXone   IVPB 1000 milliGRAM(s) IV Intermittent every 24 hours  clopidogrel Tablet 75 milliGRAM(s) Oral daily  enoxaparin Injectable 40 milliGRAM(s) SubCutaneous every 24 hours  insulin lispro (ADMELOG) corrective regimen sliding scale   SubCutaneous three times a day before meals  insulin lispro (ADMELOG) corrective regimen sliding scale   SubCutaneous at bedtime  lactated ringers. 1000 milliLiter(s) (75 mL/Hr) IV Continuous <Continuous>  metoprolol succinate ER 25 milliGRAM(s) Oral daily  metroNIDAZOLE  IVPB      metroNIDAZOLE  IVPB 500 milliGRAM(s) IV Intermittent every 8 hours  pantoprazole    Tablet 40 milliGRAM(s) Oral before breakfast  potassium chloride    Tablet ER 20 milliEquivalent(s) Oral two times a day    --------------------------------------------------------------------------------------------------  Study Interpretation:    [[[Abbreviation Key:  PDR=alpha rhythm/posterior dominant rhythm. A-P=anterior posterior.  Amplitude: ‘very low’:<20; ‘low’:20-49; ‘medium’:; ‘high’:>150uV.  Persistence for periodic/rhythmic patterns (% of epoch) ‘rare’:<1%; ‘occasional’:1-10%; ‘frequent’:10-50%; ‘abundant’:50-90%; ‘continuous’:>90%.  Persistence for sporadic discharges: ‘rare’:<1/hr; ‘occasional’:1/min-1/hr; ‘frequent’:>1/min; ‘abundant’:>1/10 sec.  RPP=rhythmic and periodic patterns; GRDA=generalized rhythmic delta activity; FIRDA=frontal intermittent GRDA; LRDA=lateralized rhythmic delta activity; TIRDA=temporal intermittent rhythmic delta activity;  LPD=PLED=lateralized periodic discharges; GPD=generalized periodic discharges; BIPDs =bilateral independent periodic discharges; Mf=multifocal; SIRPDs=stimulus induced rhythmic, periodic, or ictal appearing discharges; BIRDs=brief potentially ictal rhythmic discharges >4 Hz, lasting .5-10s; PFA (paroxysmal bursts >13 Hz or =8 Hz <10s).  Modifiers: +F=with fast component; +S=with spike component; +R=with rhythmic component.  S-B=burst suppression pattern.  Max=maximal. N1-drowsy; N2-stage II sleep; N3-slow wave sleep. SSS/BETS=small sharp spikes/benign epileptiform transients of sleep. HV=hyperventilation; PS=photic stimulation]]]    Daily EEG Visual Analysis    FINDINGS:      Background:  Continuity: continuous  Symmetry: symmetric  PDR: 7.5 Hz activity, with amplitude to 40 uV, that attenuated to eye opening.  Low amplitude frontal beta noted in wakefulness.  Reactivity: present  Voltage: normal, mostly 20-150uV  Anterior Posterior Gradient: present  Other background findings: none  Breach: absent    Background Slowing:  Generalized slowing: Diffuse polymorphic theta activity  Focal slowing: none was present.    State Changes:   -Drowsiness and N2 sleep transients were not recorded.    Sporadic Epileptiform Discharges:    None    Rhythmic and Periodic Patterns (RPPs):  None     Electrographic and Electroclinical seizures:  None    Other Clinical Events:  None    Activation Procedures:   -Hyperventilation was not performed.    -Photic stimulation was performed and did not elicit any abnormalities.      Artifacts:  Intermittent myogenic and movement artifacts were noted.    ECG:  The heart rate on single channel ECG was predominantly between 60 to 80 BPM.    EEG Classification / Summary:  Abnormal  EEG in the awake state  - Background slowing, generalized, mild.    Clinical Impression:  - Mild diffuse cerebral dysfunction is nonspecific in etiology.  There were no epileptiform abnormalities recorded.      -------------------------------------------------------------------------------------------------------  [facility] EEG Reading Room Ph#: [phone]  Epilepsy Answering Service after 5PM and before 8:30AM: Ph#: [phone]    JERMAINE Rojo  Epilepsy Fellow    Sudeep Santos MD PhD  Director, Epilepsy Division, [facility]

## 2024-02-26 NOTE — DIETITIAN INITIAL EVALUATION ADULT - FACTORS AFF FOOD INTAKE
acute on chronic comorbidities including acute encephalopathy, severe sepsis, enterocolitis/Oriental orthodox/ethnic/cultural/personal food preferences

## 2024-02-26 NOTE — PROGRESS NOTE ADULT - PROBLEM SELECTOR PLAN 1
- p/w temp 100, lactate 2.4, wbc 16  - Trend lactate  - CT A/P shows Left ovarian cyst significantly larger than prior. Nonspecific ascending and transverse colitis, either infectious or inflammatory. Fluid-filled small bowel likely enteritis. Stenotic but patent celiac artery with mild - poststenotic dilatation. Bladder wall thickening secondary to under distention or cystitis.   - CTH shows Small basal ganglia and thalamic chronic mild deep infarctions. Ischemic white matter disease and atrophy typical for age. Intracranial atherosclerosis.  - Received 2 L bolus, zosyn x1 in ED  - sepsis likely 2/2 enterocolitis vs uti  - Bcx & Ucx Neg  - Tylenol prn  - Advance diet as tolerated  - Start CTX 1g qd, Flagyl 500 q8

## 2024-02-26 NOTE — PROGRESS NOTE ADULT - PROBLEM SELECTOR PLAN 5
- p/w ams in the setting of sepsis  - AO x 1 (name) in ED  - AO x 3 at baseline  - CTH shows Small basal ganglia and thalamic chronic mild deep infarctions. Ischemic white matter disease and atrophy typical for age. Intracranial atherosclerosis  - tele monitor  - Neurology consulted Dr. Higuera  - HECTOR EEG to r/o seizure

## 2024-02-26 NOTE — DIETITIAN INITIAL EVALUATION ADULT - PROBLEM SELECTOR PLAN 4
p/w ams in the setting of sepsis  AO x 1 (name) in ED  AO x 3 at baseline  CTH shows Small basal ganglia and thalamic chronic mild deep infarctions. Ischemic white matter disease and atrophy typical for age. Intracranial atherosclerosis  rest of plan as above  tele monitor  Neurology consulted Dr. Higuera

## 2024-02-26 NOTE — PROGRESS NOTE ADULT - SUBJECTIVE AND OBJECTIVE BOX
NP Note discussed with  primary attending    Patient is a 78y old  Female who presents with a chief complaint of Sepsis 2/2 colitis/enteritis (26 Feb 2024 10:36)      INTERVAL HPI/OVERNIGHT EVENTS: no new complaints    MEDICATIONS  (STANDING):  aspirin  chewable 81 milliGRAM(s) Oral daily  atorvastatin 40 milliGRAM(s) Oral at bedtime  cefTRIAXone   IVPB 1000 milliGRAM(s) IV Intermittent every 24 hours  clopidogrel Tablet 75 milliGRAM(s) Oral daily  enoxaparin Injectable 40 milliGRAM(s) SubCutaneous every 24 hours  insulin lispro (ADMELOG) corrective regimen sliding scale   SubCutaneous at bedtime  insulin lispro (ADMELOG) corrective regimen sliding scale   SubCutaneous three times a day before meals  lactated ringers. 1000 milliLiter(s) (75 mL/Hr) IV Continuous <Continuous>  metoprolol succinate ER 25 milliGRAM(s) Oral daily  metroNIDAZOLE  IVPB      metroNIDAZOLE  IVPB 500 milliGRAM(s) IV Intermittent every 8 hours  pantoprazole    Tablet 40 milliGRAM(s) Oral before breakfast  potassium chloride    Tablet ER 20 milliEquivalent(s) Oral two times a day    MEDICATIONS  (PRN):  ondansetron Injectable 4 milliGRAM(s) IV Push every 6 hours PRN Vomiting      __________________________________________________  REVIEW OF SYSTEMS:    CONSTITUTIONAL: No fever,   EYES: no acute visual disturbances  NECK: No pain or stiffness  RESPIRATORY: No cough; No shortness of breath  CARDIOVASCULAR: No chest pain, no palpitations  GASTROINTESTINAL: No pain. No nausea or vomiting; No diarrhea   NEUROLOGICAL: No headache or numbness, no tremors  MUSCULOSKELETAL: No joint pain, no muscle pain  GENITOURINARY: no dysuria, no frequency, no hesitancy  PSYCHIATRY: no depression , no anxiety  ALL OTHER  ROS negative        Vital Signs Last 24 Hrs  T(C): 36.8 (26 Feb 2024 05:00), Max: 37.3 (25 Feb 2024 20:38)  T(F): 98.2 (26 Feb 2024 05:00), Max: 99.1 (25 Feb 2024 20:38)  HR: 52 (26 Feb 2024 05:00) (50 - 65)  BP: 156/61 (26 Feb 2024 05:00) (149/54 - 156/61)  BP(mean): --  RR: 18 (26 Feb 2024 05:00) (18 - 18)  SpO2: 99% (26 Feb 2024 05:00) (99% - 100%)    Parameters below as of 26 Feb 2024 05:00  Patient On (Oxygen Delivery Method): room air        ________________________________________________  PHYSICAL EXAM:  GENERAL: NAD  HEENT: Normocephalic;  conjunctivae and sclerae clear; moist mucous membranes;   NECK : supple  CHEST/LUNG: Clear to ausculitation bilaterally with good air entry   HEART: S1 S2  regular; no murmurs, gallops or rubs  ABDOMEN: Soft, Nontender, Nondistended; Bowel sounds present  EXTREMITIES: no cyanosis; no edema; no calf tenderness  SKIN: warm and dry; no rash  NERVOUS SYSTEM:  A&Ox2-3    _________________________________________________  LABS:                        10.9   6.48  )-----------( 190      ( 26 Feb 2024 08:05 )             34.5     02-26    138  |  109<H>  |  9   ----------------------------<  131<H>  3.8   |  21<L>  |  0.72    Ca    9.0      26 Feb 2024 08:05  Phos  2.0     02-26  Mg     2.1     02-26    TPro  6.3  /  Alb  3.0<L>  /  TBili  0.5  /  DBili  x   /  AST  102<H>  /  ALT  88<H>  /  AlkPhos  60  02-26      Urinalysis Basic - ( 26 Feb 2024 08:05 )    Color: x / Appearance: x / SG: x / pH: x  Gluc: 131 mg/dL / Ketone: x  / Bili: x / Urobili: x   Blood: x / Protein: x / Nitrite: x   Leuk Esterase: x / RBC: x / WBC x   Sq Epi: x / Non Sq Epi: x / Bacteria: x      CAPILLARY BLOOD GLUCOSE      POCT Blood Glucose.: 121 mg/dL (26 Feb 2024 11:29)  POCT Blood Glucose.: 111 mg/dL (26 Feb 2024 08:18)  POCT Blood Glucose.: 104 mg/dL (25 Feb 2024 21:08)  POCT Blood Glucose.: 124 mg/dL (25 Feb 2024 17:02)        RADIOLOGY & ADDITIONAL TESTS:    Imaging Personally Reviewed:  YES    Consultant(s) Notes Reviewed:   YES    Care Discussed with Consultants :     Plan of care was discussed with patient and /or primary care giver; all questions and concerns were addressed and care was aligned with patient's wishes.

## 2024-02-26 NOTE — DIETITIAN INITIAL EVALUATION ADULT - PROBLEM SELECTOR PLAN 5
- K is 3.4 on admission,   - likely secondary to poor PO intake  - replacing KCl 40 mg x 1  - Continue to monitor electrolytes.  - f/u BMP

## 2024-02-26 NOTE — DIETITIAN INITIAL EVALUATION ADULT - PERTINENT MEDS FT
MEDICATIONS  (STANDING):  aspirin  chewable 81 milliGRAM(s) Oral daily  atorvastatin 40 milliGRAM(s) Oral at bedtime  cefTRIAXone   IVPB 1000 milliGRAM(s) IV Intermittent every 24 hours  clopidogrel Tablet 75 milliGRAM(s) Oral daily  enoxaparin Injectable 40 milliGRAM(s) SubCutaneous every 24 hours  insulin lispro (ADMELOG) corrective regimen sliding scale   SubCutaneous three times a day before meals  insulin lispro (ADMELOG) corrective regimen sliding scale   SubCutaneous at bedtime  lactated ringers. 1000 milliLiter(s) (75 mL/Hr) IV Continuous <Continuous>  metoprolol succinate ER 25 milliGRAM(s) Oral daily  metroNIDAZOLE  IVPB      metroNIDAZOLE  IVPB 500 milliGRAM(s) IV Intermittent every 8 hours  pantoprazole    Tablet 40 milliGRAM(s) Oral before breakfast  potassium chloride    Tablet ER 20 milliEquivalent(s) Oral two times a day    MEDICATIONS  (PRN):  ondansetron Injectable 4 milliGRAM(s) IV Push every 6 hours PRN Vomiting

## 2024-02-26 NOTE — PHYSICAL THERAPY INITIAL EVALUATION ADULT - PERTINENT HX OF CURRENT PROBLEM, REHAB EVAL
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

## 2024-02-26 NOTE — PROGRESS NOTE ADULT - PROBLEM SELECTOR PLAN 9
- h/o HTN on losartan 50 mg qd, metoprolol succinate 25 mg qd, Norvasc 10 mg qd  - Monitor BP  - c/w metoprolol 25 mg qd   - hold rest of home meds in the setting of sepsis

## 2024-02-26 NOTE — DIETITIAN INITIAL EVALUATION ADULT - SIGNS/SYMPTOMS
NPO/Clear Liquid diet x 3~4d; diet advanced today, varied intake depending on food and how pt feels NPO/Clear Liquid diet x 3~4d, diet advanced today, intake may vary depending on food/how pt feels

## 2024-02-26 NOTE — PROGRESS NOTE ADULT - REASON FOR ADMISSION
Sepsis 2/2 colitis/enteritis

## 2024-02-26 NOTE — PROGRESS NOTE ADULT - SUBJECTIVE AND OBJECTIVE BOX
Patient is a 78y old  Female who presents with a chief complaint of Sepsis 2/2 colitis/enteritis (25 Feb 2024 13:35)    PATIENT IS SEEN AND EXAMINED IN MEDICAL FLOOR.  MIREILLE [    ]    DARIN [   ]      GT [   ]    ALLERGIES:  No Known Allergies      Daily     Daily     VITALS:    Vital Signs Last 24 Hrs  T(C): 36.8 (26 Feb 2024 05:00), Max: 37.3 (25 Feb 2024 20:38)  T(F): 98.2 (26 Feb 2024 05:00), Max: 99.1 (25 Feb 2024 20:38)  HR: 52 (26 Feb 2024 05:00) (50 - 65)  BP: 156/61 (26 Feb 2024 05:00) (149/54 - 156/61)  BP(mean): --  RR: 18 (26 Feb 2024 05:00) (18 - 18)  SpO2: 99% (26 Feb 2024 05:00) (99% - 100%)    Parameters below as of 26 Feb 2024 05:00  Patient On (Oxygen Delivery Method): room air        LABS:    CBC Full  -  ( 26 Feb 2024 08:05 )  WBC Count : 6.48 K/uL  RBC Count : 3.55 M/uL  Hemoglobin : 10.9 g/dL  Hematocrit : 34.5 %  Platelet Count - Automated : 190 K/uL  Mean Cell Volume : 97.2 fl  Mean Cell Hemoglobin : 30.7 pg  Mean Cell Hemoglobin Concentration : 31.6 gm/dL  Auto Neutrophil # : 4.12 K/uL  Auto Lymphocyte # : 1.66 K/uL  Auto Monocyte # : 0.55 K/uL  Auto Eosinophil # : 0.13 K/uL  Auto Basophil # : 0.01 K/uL  Auto Neutrophil % : 63.5 %  Auto Lymphocyte % : 25.6 %  Auto Monocyte % : 8.5 %  Auto Eosinophil % : 2.0 %  Auto Basophil % : 0.2 %      02-26    138  |  109<H>  |  9   ----------------------------<  131<H>  3.8   |  21<L>  |  0.72    Ca    9.0      26 Feb 2024 08:05  Phos  2.0     02-26  Mg     2.1     02-26    TPro  6.3  /  Alb  3.0<L>  /  TBili  0.5  /  DBili  x   /  AST  102<H>  /  ALT  88<H>  /  AlkPhos  60  02-26    CAPILLARY BLOOD GLUCOSE      POCT Blood Glucose.: 111 mg/dL (26 Feb 2024 08:18)  POCT Blood Glucose.: 104 mg/dL (25 Feb 2024 21:08)  POCT Blood Glucose.: 124 mg/dL (25 Feb 2024 17:02)  POCT Blood Glucose.: 128 mg/dL (25 Feb 2024 11:49)        LIVER FUNCTIONS - ( 26 Feb 2024 08:05 )  Alb: 3.0 g/dL / Pro: 6.3 g/dL / ALK PHOS: 60 U/L / ALT: 88 U/L DA / AST: 102 U/L / GGT: x           Creatinine Trend: 0.72<--, 0.66<--, 0.66<--, 1.04<--  I&O's Summary          Catheterized Catheterized  02-23 @ 19:30   No growth  --  --      Clean Catch Clean Catch (Midstream)  02-23 @ 13:04   No growth  --  --      .Blood Blood-Peripheral  02-23 @ 12:51   No growth at 48 Hours  --  --          MEDICATIONS:    MEDICATIONS  (STANDING):  aspirin  chewable 81 milliGRAM(s) Oral daily  atorvastatin 40 milliGRAM(s) Oral at bedtime  cefTRIAXone   IVPB 1000 milliGRAM(s) IV Intermittent every 24 hours  clopidogrel Tablet 75 milliGRAM(s) Oral daily  enoxaparin Injectable 40 milliGRAM(s) SubCutaneous every 24 hours  insulin lispro (ADMELOG) corrective regimen sliding scale   SubCutaneous three times a day before meals  insulin lispro (ADMELOG) corrective regimen sliding scale   SubCutaneous at bedtime  lactated ringers. 1000 milliLiter(s) (75 mL/Hr) IV Continuous <Continuous>  metoprolol succinate ER 25 milliGRAM(s) Oral daily  metroNIDAZOLE  IVPB      metroNIDAZOLE  IVPB 500 milliGRAM(s) IV Intermittent every 8 hours  pantoprazole    Tablet 40 milliGRAM(s) Oral before breakfast  potassium chloride    Tablet ER 20 milliEquivalent(s) Oral two times a day      MEDICATIONS  (PRN):  ondansetron Injectable 4 milliGRAM(s) IV Push every 6 hours PRN Vomiting      REVIEW OF SYSTEMS:                           ALL ROS DONE [ X   ]    CONSTITUTIONAL:  LETHARGIC [   ], FEVER [   ], UNRESPONSIVE [   ]  CVS:  CP  [   ], SOB, [   ], PALPITATIONS [   ], DIZZYNESS [   ]  RS: COUGH [   ], SPUTUM [   ]  GI: ABDOMINAL PAIN [   ], NAUSEA [   ], VOMITINGS [   ], DIARRHEA [   ], CONSTIPATION [   ]  :  DYSURIA [   ], NOCTURIA [   ], INCREASED FREQUENCY [   ], DRIBLING [   ],  SKELETAL: PAINFUL JOINTS [   ], SWOLLEN JOINTS [   ], NECK ACHE [   ], LOW BACK ACHE [   ],  SKIN : ULCERS [   ], RASH [   ], ITCHING [   ]  CNS: HEAD ACHE [   ], DOUBLE VISION [   ], BLURRED VISION [   ], AMS / CONFUSION [   ], SEIZURES [   ], WEAKNESS [   ],TINGLING / NUMBNESS [   ]      PHYSICAL EXAMINATION:    GENERAL APPEARANCE: NO DISTRESS  HEENT:  NO PALLOR, NO  JVD,  NO   NODES, NECK SUPPLE  CVS: S1 +, S2 +,   RS: AEEB,  OCCASIONAL  RALES +,   NO RONCHI  ABD: SOFT, NT, NO, BS +  EXT: NO PE  SKIN: WARM,   SKELETAL:  ROM ACCEPTABLE  CNS:  AAO X 2, CONFUSED, NO DEFICITS        RADIOLOGY :    < from: CT Abdomen and Pelvis w/ IV Cont (02.23.24 @ 14:40) >  IMPRESSION:  Left ovarian cyst significantly larger than prior. Recommend pelvic   ultrasound and GYN evaluation.    Nonspecific ascending and transverse colitis, either infectious or   inflammatory. Fluid-filled small bowel likely enteritis. Recommend   follow-up to ensure resolution.    Stenotic but patent celiac artery with mild poststenotic dilatation.    Bladder wall thickening secondary to under distention or cystitis.    < end of copied text >        ASSESSMENT :     Noninfectious gastroenteritis    Schizophrenia    DM (diabetes mellitus)    HTN (hypertension)    HLD (hyperlipidemia)    GERD (gastroesophageal reflux disease)        PLAN:  HPI:  78 year old Female from [facility] ambulates with a walker AOx3  at baseline with PMHx of schizophrenia on no meds, HTN, HLD, DM, GERD, Mild to moderate vascular dementia, urinary incontinence presents to the ED from NH due to AMS. History is limited due to mental status. Pt denies any complains at this time. AOx1 (name, NAD, comfortable, abdomin with diffuse tenderness to palpation. In ED, VSS, wbc 16, K 3.4, lactate 2.4, CT A/P shows Left ovarian cyst significantly larger than prior. Nonspecific ascending and transverse colitis, either infectious or inflammatory. Fluid-filled small bowel likely enteritis. Stenotic but patent celiac artery with mild poststenotic dilatation. Bladder wall thickening secondary to under distention or cystitis. CTH shows Small basal ganglia and thalamic chronic mild deep infarctions. Ischemic white matter disease and atrophy typical for age. Intracranial atherosclerosis. Pt is admitted for sepsis 2/2 colitis/enteritis.  (23 Feb 2024 17:20)        # DC PLAN BACK TO [facility] AFTER PT EVALUATION (ORDERED ON 02/25/24)  - F/UP  - ORDERED ON 02/25/24      # SEPSIS S/T ACUTE COLITIS, ACUTE GASTROENTERITIS, ACUTE CORONAVIRUS INFECTION, ACUTE NOROVIRUS INFECTION WITH GASTROENTERITIS    # ACUTE URTI, ACUTE RSV INFECTION    # LIKELY UTI  - PLACED ON ROCEPHIN + FLAGYL     -   BCX AND UCX - REVIEWED - NGTD  - NOTED CT A/P  - CLEAR LIQUID DIET / ADVANCE DIET AS TOLERATED    # HYPOKALEMIA - ADDED KCL     # ACUTE ENCEPHALOPATHY ON ADVANCED VASCULAR DEMENTIA  # ? TIA  - NOTED CT HEAD  - ASA, PLAVIX, STATIN  - F/U TSH/LIPIDS/HBA1C  - F/U ECHOCARDIOGRAM W/ BUBBLE STUDY  - PASSED BEDSIDE SWALLOW  - F/U PT EVAL  - NOTED CT HEAD  - NEUROLOGY CONSULT    # ELEVATED LACTIC ACID  - TREND LACTATE    # IMPAIRED GAIT DUE TO GENERALIZED MUSCLE WEAKNESS    # LEFT OVARIAN CYST -  ORDERED   - F/U TVUS AND PELVIC US  - GYN CONSULT    # CELIAC ARTERY STENOSIS  - VASCULAR CONSULT PLACED    # HTN  # HLD  # DM, DIABETIC RETINOPATHY, DIABETIC PERIPHERAL NEUROPATHY  # GERD  # GI AND DVT PPX.    Patient is a 78y old  Female who presents with a chief complaint of Sepsis 2/2 colitis/enteritis (25 Feb 2024 13:35)    PATIENT IS SEEN AND EXAMINED IN MEDICAL FLOOR.      ALLERGIES:  No Known Allergies      VITALS:    Vital Signs Last 24 Hrs  T(C): 36.8 (26 Feb 2024 05:00), Max: 37.3 (25 Feb 2024 20:38)  T(F): 98.2 (26 Feb 2024 05:00), Max: 99.1 (25 Feb 2024 20:38)  HR: 52 (26 Feb 2024 05:00) (50 - 65)  BP: 156/61 (26 Feb 2024 05:00) (149/54 - 156/61)  BP(mean): --  RR: 18 (26 Feb 2024 05:00) (18 - 18)  SpO2: 99% (26 Feb 2024 05:00) (99% - 100%)    Parameters below as of 26 Feb 2024 05:00  Patient On (Oxygen Delivery Method): room air        LABS:    CBC Full  -  ( 26 Feb 2024 08:05 )  WBC Count : 6.48 K/uL  RBC Count : 3.55 M/uL  Hemoglobin : 10.9 g/dL  Hematocrit : 34.5 %  Platelet Count - Automated : 190 K/uL  Mean Cell Volume : 97.2 fl  Mean Cell Hemoglobin : 30.7 pg  Mean Cell Hemoglobin Concentration : 31.6 gm/dL  Auto Neutrophil # : 4.12 K/uL  Auto Lymphocyte # : 1.66 K/uL  Auto Monocyte # : 0.55 K/uL  Auto Eosinophil # : 0.13 K/uL  Auto Basophil # : 0.01 K/uL  Auto Neutrophil % : 63.5 %  Auto Lymphocyte % : 25.6 %  Auto Monocyte % : 8.5 %  Auto Eosinophil % : 2.0 %  Auto Basophil % : 0.2 %      02-26    138  |  109<H>  |  9   ----------------------------<  131<H>  3.8   |  21<L>  |  0.72    Ca    9.0      26 Feb 2024 08:05  Phos  2.0     02-26  Mg     2.1     02-26    TPro  6.3  /  Alb  3.0<L>  /  TBili  0.5  /  DBili  x   /  AST  102<H>  /  ALT  88<H>  /  AlkPhos  60  02-26    CAPILLARY BLOOD GLUCOSE      POCT Blood Glucose.: 111 mg/dL (26 Feb 2024 08:18)  POCT Blood Glucose.: 104 mg/dL (25 Feb 2024 21:08)  POCT Blood Glucose.: 124 mg/dL (25 Feb 2024 17:02)  POCT Blood Glucose.: 128 mg/dL (25 Feb 2024 11:49)        LIVER FUNCTIONS - ( 26 Feb 2024 08:05 )  Alb: 3.0 g/dL / Pro: 6.3 g/dL / ALK PHOS: 60 U/L / ALT: 88 U/L DA / AST: 102 U/L / GGT: x           Creatinine Trend: 0.72<--, 0.66<--, 0.66<--, 1.04<--  I&O's Summary          Catheterized Catheterized  02-23 @ 19:30   No growth  --  --      Clean Catch Clean Catch (Midstream)  02-23 @ 13:04   No growth  --  --      .Blood Blood-Peripheral  02-23 @ 12:51   No growth at 48 Hours  --  --          MEDICATIONS:    MEDICATIONS  (STANDING):  aspirin  chewable 81 milliGRAM(s) Oral daily  atorvastatin 40 milliGRAM(s) Oral at bedtime  cefTRIAXone   IVPB 1000 milliGRAM(s) IV Intermittent every 24 hours  clopidogrel Tablet 75 milliGRAM(s) Oral daily  enoxaparin Injectable 40 milliGRAM(s) SubCutaneous every 24 hours  insulin lispro (ADMELOG) corrective regimen sliding scale   SubCutaneous three times a day before meals  insulin lispro (ADMELOG) corrective regimen sliding scale   SubCutaneous at bedtime  lactated ringers. 1000 milliLiter(s) (75 mL/Hr) IV Continuous <Continuous>  metoprolol succinate ER 25 milliGRAM(s) Oral daily  metroNIDAZOLE  IVPB      metroNIDAZOLE  IVPB 500 milliGRAM(s) IV Intermittent every 8 hours  pantoprazole    Tablet 40 milliGRAM(s) Oral before breakfast  potassium chloride    Tablet ER 20 milliEquivalent(s) Oral two times a day      MEDICATIONS  (PRN):  ondansetron Injectable 4 milliGRAM(s) IV Push every 6 hours PRN Vomiting      REVIEW OF SYSTEMS:                           ALL ROS DONE [ X   ]    CONSTITUTIONAL:  LETHARGIC [   ], FEVER [   ], UNRESPONSIVE [   ]  CVS:  CP  [   ], SOB, [   ], PALPITATIONS [   ], DIZZYNESS [   ]  RS: COUGH [   ], SPUTUM [   ]  GI: ABDOMINAL PAIN [   ], NAUSEA [   ], VOMITINGS [   ], DIARRHEA [   ], CONSTIPATION [   ]  :  DYSURIA [   ], NOCTURIA [   ], INCREASED FREQUENCY [   ], DRIBLING [   ],  SKELETAL: PAINFUL JOINTS [   ], SWOLLEN JOINTS [   ], NECK ACHE [   ], LOW BACK ACHE [   ],  SKIN : ULCERS [   ], RASH [   ], ITCHING [   ]  CNS: HEAD ACHE [   ], DOUBLE VISION [   ], BLURRED VISION [   ], AMS / CONFUSION [   ], SEIZURES [   ], WEAKNESS [   ],TINGLING / NUMBNESS [   ]      PHYSICAL EXAMINATION:    GENERAL APPEARANCE: NO DISTRESS  HEENT:  NO PALLOR, NO  JVD,  NO   NODES, NECK SUPPLE  CVS: S1 +, S2 +,   RS: AEEB,  OCCASIONAL  RALES +,   NO RONCHI  ABD: SOFT, NT, NO, BS +  EXT: NO PE  SKIN: WARM,   SKELETAL:  ROM ACCEPTABLE  CNS:  AAO X 2, CONFUSED, NO DEFICITS        RADIOLOGY :    < from: CT Abdomen and Pelvis w/ IV Cont (02.23.24 @ 14:40) >  IMPRESSION:  Left ovarian cyst significantly larger than prior. Recommend pelvic   ultrasound and GYN evaluation.    Nonspecific ascending and transverse colitis, either infectious or   inflammatory. Fluid-filled small bowel likely enteritis. Recommend   follow-up to ensure resolution.    Stenotic but patent celiac artery with mild poststenotic dilatation.    Bladder wall thickening secondary to under distention or cystitis.    < end of copied text >        ASSESSMENT :     Noninfectious gastroenteritis    Schizophrenia    DM (diabetes mellitus)    HTN (hypertension)    HLD (hyperlipidemia)    GERD (gastroesophageal reflux disease)        PLAN:  HPI:  78 year old Female from [facility] ambulates with a walker AOx3  at baseline with PMHx of schizophrenia on no meds, HTN, HLD, DM, GERD, Mild to moderate vascular dementia, urinary incontinence presents to the ED from NH due to AMS. History is limited due to mental status. Pt denies any complains at this time. AOx1 (name, NAD, comfortable, abdomin with diffuse tenderness to palpation. In ED, VSS, wbc 16, K 3.4, lactate 2.4, CT A/P shows Left ovarian cyst significantly larger than prior. Nonspecific ascending and transverse colitis, either infectious or inflammatory. Fluid-filled small bowel likely enteritis. Stenotic but patent celiac artery with mild poststenotic dilatation. Bladder wall thickening secondary to under distention or cystitis. CTH shows Small basal ganglia and thalamic chronic mild deep infarctions. Ischemic white matter disease and atrophy typical for age. Intracranial atherosclerosis. Pt is admitted for sepsis 2/2 colitis/enteritis.  (23 Feb 2024 17:20)        # DC PLAN BACK TO [facility] AFTER PT EVALUATION (ORDERED ON 02/25/24)  - F/UP  - ORDERED ON 02/25/24      # SEPSIS S/T ACUTE COLITIS, ACUTE GASTROENTERITIS, ACUTE CORONAVIRUS INFECTION, ACUTE NOROVIRUS INFECTION WITH GASTROENTERITIS    # ACUTE URTI, ACUTE RSV INFECTION    # LIKELY UTI  - PLACED ON ROCEPHIN + FLAGYL     -   BCX AND UCX - REVIEWED - NGTD  - NOTED CT A/P  - CLEAR LIQUID DIET / ADVANCE DIET AS TOLERATED    # HYPOKALEMIA - ADDED KCL     # ACUTE ENCEPHALOPATHY ON ADVANCED VASCULAR DEMENTIA  - NOTED CT HEAD  - ASA, PLAVIX, STATIN  - PASSED BEDSIDE SWALLOW  - NOTED PT EVAL  - NOTED CT HEAD  - NEUROLOGY CONSULT    - EEG - Mild diffuse cerebral dysfunction is nonspecific in etiology    # ELEVATED LACTIC ACID  - TREND LACTATE    # IMPAIRED GAIT DUE TO GENERALIZED MUSCLE WEAKNESS    # LEFT OVARIAN CYST -  ORDERED   - F/U TVUS AND PELVIC US - WILL ORDER/F/U IN COMMUNITY   - RECOMMEND GYN FOLLOWUP IN COMMUNITY    # CELIAC ARTERY STENOSIS  - VASCULAR CONSULT PLACED    # HTN  # HLD  # DM, DIABETIC RETINOPATHY, DIABETIC PERIPHERAL NEUROPATHY  # GERD  # GI AND DVT PPX.

## 2024-02-26 NOTE — CHART NOTE - NSCHARTNOTEFT_GEN_A_CORE
Notified by RN at 1840 that pt's lopez D/grady today at 1030 AM. Pt still not voided with bladder scan of 339ml.   Per RN, no bladder distention noted.  Action:  - Straight cath now & repeat bladder scan in 6 hours  - Will endorse to PM NP to f/u with bladder scan

## 2024-02-26 NOTE — DIETITIAN INITIAL EVALUATION ADULT - PERTINENT LABORATORY DATA
02-26    138  |  109<H>  |  9   ----------------------------<  131<H>  3.8   |  21<L>  |  0.72    Ca    9.0      26 Feb 2024 08:05  Phos  2.0     02-26  Mg     2.1     02-26    TPro  6.3  /  Alb  3.0<L>  /  TBili  0.5  /  DBili  x   /  AST  102<H>  /  ALT  88<H>  /  AlkPhos  60  02-26  POCT Blood Glucose.: 121 mg/dL (02-26-24 @ 11:29)

## 2024-02-26 NOTE — DIETITIAN INITIAL EVALUATION ADULT - NS FNS DIET ORDER
Diet, DASH/TLC:   Sodium & Cholesterol Restricted  Consistent Carbohydrate {No Snacks} (02-26-24 @ 10:10)

## 2024-02-26 NOTE — DIETITIAN INITIAL EVALUATION ADULT - PROBLEM SELECTOR PLAN 1
p/w temp 100, lactate 2.4, wbc 16  trend lactate  CT A/P shows Left ovarian cyst significantly larger than prior. Nonspecific ascending and transverse colitis, either infectious or inflammatory. Fluid-filled small bowel likely enteritis. Stenotic but patent celiac artery with mild poststenotic dilatation. Bladder wall thickening secondary to under distention or cystitis.   CTH shows Small basal ganglia and thalamic chronic mild deep infarctions. Ischemic white matter disease and atrophy typical for age. Intracranial atherosclerosis.  received 2 L bolus, zosyn x1 in ED  sepsis likely 2/2 enterocolitis vs uti  f/u blood cultures, urine cultures  f/u UA  tylenol prn  CLD, advance diet as tolerated  Start CTX 1g qd, Flagyl 500 q8  replace electrolytes as needed

## 2024-02-26 NOTE — DIETITIAN INITIAL EVALUATION ADULT - ETIOLOGY
acute on chronic comorbidities including acute encephalopathy, severe sepsis, enterocolitis; cultural/ethnical/individual food preferences

## 2024-02-26 NOTE — PROGRESS NOTE ADULT - ASSESSMENT
78 year old Female from [facility] ambulates with a walker at baseline with PMHx of schizophrenia on no meds, HTN, HLD, DM, GERD, Mild to moderate vascular dementia, urinary incontinence presents to the ED from NH due to AMS. In ED, VSS, wbc 16, K 3.4, lactate 2.4, CT A/P shows Left ovarian cyst significantly larger than prior. Nonspecific ascending and transverse colitis, either infectious or inflammatory. Fluid-filled small bowel likely enteritis. Stenotic but patent celiac artery with mild poststenotic dilatation. Bladder wall thickening secondary to under distention or cystitis. CTH shows Small basal ganglia and thalamic chronic mild deep infarctions. Ischemic white matter disease and atrophy typical for age. Intracranial atherosclerosis. Pt is admitted for sepsis 2/2 colitis/enteritis.

## 2024-02-26 NOTE — DIETITIAN INITIAL EVALUATION ADULT - OTHER INFO
Pt from assisted living facility, alert, verbally responsive, able to obtain basic/simple information, confused with dementia per chart; NPO/Clear Liquid diet x 3 to 4d in-house, requesting solid meals, diet advanced today by Provider, no specific food choices; Unknown food allergies, denied recent wt changes, no other nutrition related complaints at present  Pt from assisted living facility, alert, verbally responsive, able to obtain basic/simple information, confused with dementia per chart; NPO/Clear Liquid diet x 3 to 4d in-house, requesting solid meals, diet advanced today by Provider, no specific food choices; Unknown food allergies, denied recent wt changes, varied intake depending on food and how pt feels  Pt from assisted living facility, alert, verbally responsive, able to obtain basic/simple information, confused with dementia per chart; NPO/Clear Liquid diet x 3 to 4d in-house, requesting solid meals, diet advanced today by Provider, no specific food choices; Unknown food allergies, denied recent wt changes, intake may vary depending on food and how pt feels

## 2024-02-27 ENCOUNTER — TRANSCRIPTION ENCOUNTER (OUTPATIENT)
Age: 79
End: 2024-02-27

## 2024-02-27 VITALS
HEART RATE: 72 BPM | TEMPERATURE: 99 F | OXYGEN SATURATION: 100 % | RESPIRATION RATE: 18 BRPM | DIASTOLIC BLOOD PRESSURE: 75 MMHG | SYSTOLIC BLOOD PRESSURE: 136 MMHG

## 2024-02-27 LAB
ANION GAP SERPL CALC-SCNC: 7 MMOL/L — SIGNIFICANT CHANGE UP (ref 5–17)
BUN SERPL-MCNC: 8 MG/DL — SIGNIFICANT CHANGE UP (ref 7–18)
CALCIUM SERPL-MCNC: 8.4 MG/DL — SIGNIFICANT CHANGE UP (ref 8.4–10.5)
CHLORIDE SERPL-SCNC: 104 MMOL/L — SIGNIFICANT CHANGE UP (ref 96–108)
CO2 SERPL-SCNC: 24 MMOL/L — SIGNIFICANT CHANGE UP (ref 22–31)
CREAT SERPL-MCNC: 0.67 MG/DL — SIGNIFICANT CHANGE UP (ref 0.5–1.3)
EGFR: 88 ML/MIN/1.73M2 — SIGNIFICANT CHANGE UP
GLUCOSE BLDC GLUCOMTR-MCNC: 146 MG/DL — HIGH (ref 70–99)
GLUCOSE BLDC GLUCOMTR-MCNC: 187 MG/DL — HIGH (ref 70–99)
GLUCOSE SERPL-MCNC: 144 MG/DL — HIGH (ref 70–99)
HCT VFR BLD CALC: 37.7 % — SIGNIFICANT CHANGE UP (ref 34.5–45)
HGB BLD-MCNC: 12.3 G/DL — SIGNIFICANT CHANGE UP (ref 11.5–15.5)
MAGNESIUM SERPL-MCNC: 2 MG/DL — SIGNIFICANT CHANGE UP (ref 1.6–2.6)
MCHC RBC-ENTMCNC: 31.4 PG — SIGNIFICANT CHANGE UP (ref 27–34)
MCHC RBC-ENTMCNC: 32.6 GM/DL — SIGNIFICANT CHANGE UP (ref 32–36)
MCV RBC AUTO: 96.2 FL — SIGNIFICANT CHANGE UP (ref 80–100)
NRBC # BLD: 0 /100 WBCS — SIGNIFICANT CHANGE UP (ref 0–0)
PHOSPHATE SERPL-MCNC: 2.9 MG/DL — SIGNIFICANT CHANGE UP (ref 2.5–4.5)
PLATELET # BLD AUTO: 197 K/UL — SIGNIFICANT CHANGE UP (ref 150–400)
POTASSIUM SERPL-MCNC: 4.1 MMOL/L — SIGNIFICANT CHANGE UP (ref 3.5–5.3)
POTASSIUM SERPL-SCNC: 4.1 MMOL/L — SIGNIFICANT CHANGE UP (ref 3.5–5.3)
RBC # BLD: 3.92 M/UL — SIGNIFICANT CHANGE UP (ref 3.8–5.2)
RBC # FLD: 12.1 % — SIGNIFICANT CHANGE UP (ref 10.3–14.5)
SODIUM SERPL-SCNC: 135 MMOL/L — SIGNIFICANT CHANGE UP (ref 135–145)
WBC # BLD: 5.82 K/UL — SIGNIFICANT CHANGE UP (ref 3.8–10.5)
WBC # FLD AUTO: 5.82 K/UL — SIGNIFICANT CHANGE UP (ref 3.8–10.5)

## 2024-02-27 PROCEDURE — 71045 X-RAY EXAM CHEST 1 VIEW: CPT

## 2024-02-27 PROCEDURE — 0225U NFCT DS DNA&RNA 21 SARSCOV2: CPT

## 2024-02-27 PROCEDURE — 87086 URINE CULTURE/COLONY COUNT: CPT

## 2024-02-27 PROCEDURE — 85025 COMPLETE CBC W/AUTO DIFF WBC: CPT

## 2024-02-27 PROCEDURE — 85730 THROMBOPLASTIN TIME PARTIAL: CPT

## 2024-02-27 PROCEDURE — 84100 ASSAY OF PHOSPHORUS: CPT

## 2024-02-27 PROCEDURE — 87040 BLOOD CULTURE FOR BACTERIA: CPT

## 2024-02-27 PROCEDURE — 36415 COLL VENOUS BLD VENIPUNCTURE: CPT

## 2024-02-27 PROCEDURE — 87637 SARSCOV2&INF A&B&RSV AMP PRB: CPT

## 2024-02-27 PROCEDURE — 74177 CT ABD & PELVIS W/CONTRAST: CPT | Mod: MC

## 2024-02-27 PROCEDURE — 85027 COMPLETE CBC AUTOMATED: CPT

## 2024-02-27 PROCEDURE — 95957 EEG DIGITAL ANALYSIS: CPT

## 2024-02-27 PROCEDURE — 85610 PROTHROMBIN TIME: CPT

## 2024-02-27 PROCEDURE — 81001 URINALYSIS AUTO W/SCOPE: CPT

## 2024-02-27 PROCEDURE — 80048 BASIC METABOLIC PNL TOTAL CA: CPT

## 2024-02-27 PROCEDURE — 0241U: CPT

## 2024-02-27 PROCEDURE — 93005 ELECTROCARDIOGRAM TRACING: CPT

## 2024-02-27 PROCEDURE — 70450 CT HEAD/BRAIN W/O DYE: CPT | Mod: MC

## 2024-02-27 PROCEDURE — 99291 CRITICAL CARE FIRST HOUR: CPT

## 2024-02-27 PROCEDURE — 80053 COMPREHEN METABOLIC PANEL: CPT

## 2024-02-27 PROCEDURE — 83605 ASSAY OF LACTIC ACID: CPT

## 2024-02-27 PROCEDURE — 96375 TX/PRO/DX INJ NEW DRUG ADDON: CPT

## 2024-02-27 PROCEDURE — 96374 THER/PROPH/DIAG INJ IV PUSH: CPT

## 2024-02-27 PROCEDURE — 83735 ASSAY OF MAGNESIUM: CPT

## 2024-02-27 PROCEDURE — 95816 EEG AWAKE AND DROWSY: CPT

## 2024-02-27 PROCEDURE — 82962 GLUCOSE BLOOD TEST: CPT

## 2024-02-27 PROCEDURE — 97162 PT EVAL MOD COMPLEX 30 MIN: CPT

## 2024-02-27 PROCEDURE — 87507 IADNA-DNA/RNA PROBE TQ 12-25: CPT

## 2024-02-27 RX ORDER — TAMSULOSIN HYDROCHLORIDE 0.4 MG/1
1 CAPSULE ORAL
Qty: 0 | Refills: 0 | DISCHARGE
Start: 2024-02-27

## 2024-02-27 RX ORDER — CEFUROXIME AXETIL 250 MG
1 TABLET ORAL
Qty: 4 | Refills: 0
Start: 2024-02-27 | End: 2024-02-28

## 2024-02-27 RX ORDER — TAMSULOSIN HYDROCHLORIDE 0.4 MG/1
0.4 CAPSULE ORAL AT BEDTIME
Refills: 0 | Status: DISCONTINUED | OUTPATIENT
Start: 2024-02-27 | End: 2024-02-27

## 2024-02-27 RX ORDER — METRONIDAZOLE 500 MG
1 TABLET ORAL
Qty: 6 | Refills: 0
Start: 2024-02-27 | End: 2024-02-28

## 2024-02-27 RX ADMIN — PANTOPRAZOLE SODIUM 40 MILLIGRAM(S): 20 TABLET, DELAYED RELEASE ORAL at 05:45

## 2024-02-27 RX ADMIN — Medication 20 MILLIEQUIVALENT(S): at 05:45

## 2024-02-27 RX ADMIN — Medication 1: at 11:25

## 2024-02-27 RX ADMIN — Medication 81 MILLIGRAM(S): at 11:25

## 2024-02-27 RX ADMIN — ENOXAPARIN SODIUM 40 MILLIGRAM(S): 100 INJECTION SUBCUTANEOUS at 05:45

## 2024-02-27 RX ADMIN — CLOPIDOGREL BISULFATE 75 MILLIGRAM(S): 75 TABLET, FILM COATED ORAL at 11:25

## 2024-02-27 RX ADMIN — TAMSULOSIN HYDROCHLORIDE 0.4 MILLIGRAM(S): 0.4 CAPSULE ORAL at 11:25

## 2024-02-27 NOTE — DISCHARGE NOTE PROVIDER - NSDCCPCAREPLAN_GEN_ALL_CORE_FT
PRINCIPAL DISCHARGE DIAGNOSIS  Diagnosis: Sepsis  Assessment and Plan of Treatment: You were admitted and treated for sepsis. Sepsis is the body's extreme response to an infection. The cause of your sepsis is likely due to your norovirus infection. Take all antibiotics as ordered.  Call you Health care provider upon arrival home to make a one week follow up appointment.  If you develop fever, chills, malaise, or change in mental status call your Health Care Provider or go to the Emergency Department.  Nutrition is important, eat small frequent meals to help ensure you get adequate calories.  Do not stay in bed all day!  Increase your activity daily as tolerated.        SECONDARY DISCHARGE DIAGNOSES  Diagnosis: Enterocolitis  Assessment and Plan of Treatment: You were treated for entercolitis with IV antibiotics.   Get plenty of rest.  Drink enough water and fluids to keep your urine clear or pale yellow.  Eat a well-balanced diet.  Call your caregiver for follow-up as recommended.  SEEK IMMEDIATE MEDICAL CARE IF:  You develop chills.  You have an oral temperature above 100.4 not controlled by medicine.  You have extreme weakness, fainting, or dehydration.  You have repeated vomiting.  You develop severe belly (abdominal) pain or are passing bloody or tarry stools      Diagnosis: Norovirus  Assessment and Plan of Treatment: Follow the plav of care above.    Diagnosis: Acute encephalopathy  Assessment and Plan of Treatment: You were seen by a neurologist for acute encephalopathy. Acute encephalopathy is the term used for confusion brought on by a medical event.  These changes can include infections, metabolic changes, temporary loss of oxygen to the brain, trauma and other causes.  Once the underlying problem has been corrected, encephalopathy can resolve on its own.  You had a ct scan of your head which was negative for any acute pathologies. You had an eeg which was negative for any seizure activities. Please follow- up with your PCP for further management.    Diagnosis: Celiac artery stenosis  Assessment and Plan of Treatment: You were found to have a stenosis of your celiac artery. You were seen by the vascualr team who reccommends: no acute vascular intervnetion dueing hospital stay. CT shows patent celiac, SMA, ROSS with generalized atherosclerosis.   Recommend Aspirin, statin.Please follow- up with your PCP on discharge for further management.       Diagnosis: Ovarian cyst  Assessment and Plan of Treatment: You were found to have an ovarian cyst on imaging. It is reccommended that you follow- up outpt for a pelvic ultrasound. RECOMMEND GYN FOLLOWUP IN [facility].    Diagnosis: Schizophrenia  Assessment and Plan of Treatment: You have a history of schizophrenia, please take your medicatrins as prescribed. Follow- up wiht your primary care provider on discharge for further management.    Diagnosis: HTN (hypertension)  Assessment and Plan of Treatment: You have a history of high blood pressure. High blood pressure is a condition that puts you at risk for heart attack, stroke and kidney disease. Please continue to take your medications as prescribed. You can also help control your blood pressure by maintaining a healthy weight, eating a diet low in fat and rich in fruits and vegetables, reduce the amount of salt in your diet. Also, reduce alcohol and try to include some form of physical activity daily for at least 30 mins. Follow up with your medical doctor to establish long term blood pressure treatment goals.  Notify your doctor if you have any of the following symptoms:   Dizziness, Lightheadedness, Blurry vision, Headache, Chest pain, Shortness of breath      Diagnosis: GERD (gastroesophageal reflux disease)  Assessment and Plan of Treatment: Change the factors that you can control. Ask your caregiver for guidance concerning weight loss, quitting smoking, and alcohol consumption. Avoid foods and drinks that make your symptoms worse, such as: Caffeine or alcoholic drinks. Chocolate. spicy foods. Citrus fruits tomato-based foods, Fried and fatty foods.  Avoid lying down for the 3 hours after eating .Eat small, frequent meals  Do not wear anything tight around your waist that causes pressure on your stomach. Raise the head of your bed 6 to 8 inches with wood blocks to help you sleep. Do not take aspirin, ibuprofen, or other nonsteroidal anti-inflammatory drugs (NSAIDs).You have pain in your arms, neck, jaw, teeth, or back. Your pain increases or changes in intensity or duration. You develop nausea, vomiting, or sweating (diaphoresis).You develop shortness of breath, or you faint.Your vomit is green, yellow, black, or looks like coffee grounds or blood.Your stool is red, bloody, or black.These symptoms could be signs of other problems, such as heart disease, gastric bleeding, or esophageal bleeding.      Diagnosis: HLD (hyperlipidemia)  Assessment and Plan of Treatment: You have a history of hyperlipidemia, which is when you have too much cholesterol in your blood. High amounts of cholesterol in your blood can put you at higher risks for heart attck, strokes and other health problems. Follow up with PCP for treatment goals, continue medication as prescribed, have liver function testing every 3 months as anti lipid medications can cause liver irritation, eat low fat meals, avoid red meat, butter, fried foods and cheese. Get daily exercise.      Diagnosis: DM (diabetes mellitus)  Assessment and Plan of Treatment: Continue to follow with your primary care MD or your endocrinologist. Discuss what the goal hemoglobin A1C level is for you.  Follow a heart healthy diabetic diet. If you check your fingerstick glucose at home, call your MD if it is greater than 250mg/dL on 2 occasions or less than 100mg/dL on 2 occasions. Know signs of low blood sugar, such as: dizziness, shakiness, sweating, confusion, hunger, nervousness- drink 4 ounces apple juice if occurs and call your doctor. Know early signs of high blood sugar, such as: frequent urination, increased thirst, blurry vision, fatigue, headache - call your doctor if this occurs.       PRINCIPAL DISCHARGE DIAGNOSIS  Diagnosis: Sepsis  Assessment and Plan of Treatment: You were admitted and treated for sepsis. Sepsis is the body's extreme response to an infection. The cause of your sepsis is likely due to your norovirus infection. Take all antibiotics as ordered.  Call you Health care provider upon arrival home to make a one week follow up appointment.  If you develop fever, chills, malaise, or change in mental status call your Health Care Provider or go to the Emergency Department.  Nutrition is important, eat small frequent meals to help ensure you get adequate calories.  Do not stay in bed all day!  Increase your activity daily as tolerated.        SECONDARY DISCHARGE DIAGNOSES  Diagnosis: Enterocolitis  Assessment and Plan of Treatment: You were treated for entercolitis with IV antibiotics.   Get plenty of rest.  Drink enough water and fluids to keep your urine clear or pale yellow.  Eat a well-balanced diet.  Call your caregiver for follow-up as recommended.  SEEK IMMEDIATE MEDICAL CARE IF:  You develop chills.  You have an oral temperature above 100.4 not controlled by medicine.  You have extreme weakness, fainting, or dehydration.  You have repeated vomiting.  You develop severe belly (abdominal) pain or are passing bloody or tarry stools      Diagnosis: Acute encephalopathy  Assessment and Plan of Treatment: You were seen by a neurologist for acute encephalopathy. Acute encephalopathy is the term used for confusion brought on by a medical event.  These changes can include infections, metabolic changes, temporary loss of oxygen to the brain, trauma and other causes.  Once the underlying problem has been corrected, encephalopathy can resolve on its own.  You had a ct scan of your head which was negative for any acute pathologies. You had an eeg which was negative for any seizure activities. Please follow- up with your PCP for further management.    Diagnosis: Schizophrenia  Assessment and Plan of Treatment: You have a history of schizophrenia, please take your medicatrins as prescribed. Follow- up wiht your primary care provider on discharge for further management.    Diagnosis: DM (diabetes mellitus)  Assessment and Plan of Treatment: Continue to follow with your primary care MD or your endocrinologist. Discuss what the goal hemoglobin A1C level is for you.  Follow a heart healthy diabetic diet. If you check your fingerstick glucose at home, call your MD if it is greater than 250mg/dL on 2 occasions or less than 100mg/dL on 2 occasions. Know signs of low blood sugar, such as: dizziness, shakiness, sweating, confusion, hunger, nervousness- drink 4 ounces apple juice if occurs and call your doctor. Know early signs of high blood sugar, such as: frequent urination, increased thirst, blurry vision, fatigue, headache - call your doctor if this occurs.      Diagnosis: HTN (hypertension)  Assessment and Plan of Treatment: You have a history of high blood pressure. High blood pressure is a condition that puts you at risk for heart attack, stroke and kidney disease. Please continue to take your medications as prescribed. You can also help control your blood pressure by maintaining a healthy weight, eating a diet low in fat and rich in fruits and vegetables, reduce the amount of salt in your diet. Also, reduce alcohol and try to include some form of physical activity daily for at least 30 mins. Follow up with your medical doctor to establish long term blood pressure treatment goals.  Notify your doctor if you have any of the following symptoms:   Dizziness, Lightheadedness, Blurry vision, Headache, Chest pain, Shortness of breath      Diagnosis: HLD (hyperlipidemia)  Assessment and Plan of Treatment: You have a history of hyperlipidemia, which is when you have too much cholesterol in your blood. High amounts of cholesterol in your blood can put you at higher risks for heart attck, strokes and other health problems. Follow up with PCP for treatment goals, continue medication as prescribed, have liver function testing every 3 months as anti lipid medications can cause liver irritation, eat low fat meals, avoid red meat, butter, fried foods and cheese. Get daily exercise.      Diagnosis: GERD (gastroesophageal reflux disease)  Assessment and Plan of Treatment: Change the factors that you can control. Ask your caregiver for guidance concerning weight loss, quitting smoking, and alcohol consumption. Avoid foods and drinks that make your symptoms worse, such as: Caffeine or alcoholic drinks. Chocolate. spicy foods. Citrus fruits tomato-based foods, Fried and fatty foods.  Avoid lying down for the 3 hours after eating .Eat small, frequent meals  Do not wear anything tight around your waist that causes pressure on your stomach. Raise the head of your bed 6 to 8 inches with wood blocks to help you sleep. Do not take aspirin, ibuprofen, or other nonsteroidal anti-inflammatory drugs (NSAIDs).You have pain in your arms, neck, jaw, teeth, or back. Your pain increases or changes in intensity or duration. You develop nausea, vomiting, or sweating (diaphoresis).You develop shortness of breath, or you faint.Your vomit is green, yellow, black, or looks like coffee grounds or blood.Your stool is red, bloody, or black.These symptoms could be signs of other problems, such as heart disease, gastric bleeding, or esophageal bleeding.      Diagnosis: Norovirus  Assessment and Plan of Treatment: Follow the plav of care above.    Diagnosis: Ovarian cyst  Assessment and Plan of Treatment: You were found to have an ovarian cyst on imaging. It is reccommended that you follow- up outpt for a pelvic ultrasound. RECOMMEND GYN FOLLOWUP IN [facility].    Diagnosis: Celiac artery stenosis  Assessment and Plan of Treatment: You were found to have a stenosis of your celiac artery. You were seen by the vascualr team who reccommends: no acute vascular intervnetion dueing hospital stay. CT shows patent celiac, SMA, ROSS with generalized atherosclerosis.   Recommend Aspirin, statin.Please follow- up with your PCP on discharge for further management.       Diagnosis: Urinary retention  Assessment and Plan of Treatment: You were noted to have urinary retention  You were inserted with a urine bag called Dorina, attempted trial of void which you failed to urinate on your own.  You were started on trial of medication to help relax your smooth muscles in your ureters to improve urinary flow  Subsequently, another urine bag was reinserted  Another trial of void is advised at your Assisted Living after discharge.

## 2024-02-27 NOTE — DISCHARGE NOTE PROVIDER - HOSPITAL COURSE
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 In ED, VSS, wbc 16, K 3.4, lactate 2.4, CT A/P shows Left ovarian cyst significantly larger than prior. Nonspecific ascending and transverse colitis, either infectious or inflammatory. Fluid-filled small bowel likely enteritis. Stenotic but patent celiac artery with mild poststenotic dilatation. Bladder wall thickening secondary to under distention or cystitis. CTH shows Small basal ganglia and thalamic chronic mild deep infarctions. Ischemic white matter disease and atrophy typical for age. Intracranial atherosclerosis. Pt is admitted for sepsis 2/2 colitis/enteritis. Pt stool tested positive for norovirus.  Pt treated with abx and all other cultures are negative.     neuro consulted for encephalopathy, eeg showed:  Mild diffuse cerebral dysfunction is nonspecific in etiology.  There were no epileptiform abnormalities recorded.        PT recc: Home PT.   Case discussed with attending, pt medically stable for d/c back to [facility]. Please note that this a brief summary of hospital course please refer to daily progress notes and consult notes for full course and events   78 year old Female from [facility] ambulates with a walker AOx3  at baseline with PMHx of schizophrenia on no meds, HTN, HLD, DM, GERD, Mild to moderate vascular dementia, urinary incontinence presents to the ED from NH due to AMS. In ED, VSS, wbc 16, K 3.4, lactate 2.4, CT A/P shows Left ovarian cyst significantly larger than prior. Nonspecific ascending and transverse colitis, either infectious or inflammatory. Fluid-filled small bowel likely enteritis. Stenotic but patent celiac artery with mild poststenotic dilatation. Bladder wall thickening secondary to under distention or cystitis. CTH shows Small basal ganglia and thalamic chronic mild deep infarctions. Ischemic white matter disease and atrophy typical for age. Intracranial atherosclerosis. Pt is admitted for sepsis 2/2 colitis/enteritis. Pt stool tested positive for norovirus.  Pt treated with abx and all other cultures are negative.     neuro consulted for encephalopathy, eeg showed:  Mild diffuse cerebral dysfunction is nonspecific in etiology.  There were no epileptiform abnormalities recorded.      Patient also noted with urinary retention, lopez cath inserted. Pt failed TOV, & lopez reinserted. TOV to be attempted at Assisted Living.     PT recc: Home PT.   Case discussed with attending, pt medically stable for d/c back to [facility]. Please note that this a brief summary of hospital course please refer to daily progress notes and consult notes for full course and events

## 2024-02-27 NOTE — DISCHARGE NOTE NURSING/CASE MANAGEMENT/SOCIAL WORK - PATIENT PORTAL LINK FT
You can access the FollowMyHealth Patient Portal offered by [facility] by registering at the following website: [email or web address]. By joining [facility] FollowMyHealth portal, you will also be able to view your health information using other applications (apps) compatible with our system.

## 2024-02-27 NOTE — DISCHARGE NOTE PROVIDER - NSDCMRMEDTOKEN_GEN_ALL_CORE_FT
aspirin 81 mg oral capsule: 1 cap(s) orally once a day  atorvastatin 40 mg oral tablet: 1 tab(s) orally once a day (at bedtime)  glipiZIDE 5 mg oral tablet: 1 tab(s) orally once a day  Januvia 25 mg oral tablet: 1 tab(s) orally once a day  losartan 50 mg oral tablet: 1 tab(s) orally once a day  metoprolol succinate 25 mg oral capsule, extended release: 1 cap(s) orally once a day  Norvasc 10 mg oral tablet: 1 tab(s) orally once a day  pantoprazole 40 mg oral delayed release tablet: 1 tab(s) orally once a day (before a meal)   aspirin 81 mg oral capsule: 1 cap(s) orally once a day  atorvastatin 40 mg oral tablet: 1 tab(s) orally once a day (at bedtime)  cefuroxime 250 mg oral tablet: 1 tab(s) orally 2 times a day  glipiZIDE 5 mg oral tablet: 1 tab(s) orally once a day  Januvia 25 mg oral tablet: 1 tab(s) orally once a day  losartan 50 mg oral tablet: 1 tab(s) orally once a day  metoprolol succinate 25 mg oral capsule, extended release: 1 cap(s) orally once a day  metroNIDAZOLE 500 mg oral tablet: 1 tab(s) orally 3 times a day  Norvasc 10 mg oral tablet: 1 tab(s) orally once a day  pantoprazole 40 mg oral delayed release tablet: 1 tab(s) orally once a day (before a meal)  tamsulosin 0.4 mg oral capsule: 1 cap(s) orally once a day (at bedtime)

## 2024-02-27 NOTE — DISCHARGE NOTE PROVIDER - CARE PROVIDER_API CALL
Barbara Knight [facility]  Internal Medicine  [address] [place] [id number]  Phone: [phone]  Fax: [phone]  Established Patient  Follow Up Time: 1 week

## 2024-02-28 LAB
CULTURE RESULTS: SIGNIFICANT CHANGE UP
CULTURE RESULTS: SIGNIFICANT CHANGE UP
SPECIMEN SOURCE: SIGNIFICANT CHANGE UP
SPECIMEN SOURCE: SIGNIFICANT CHANGE UP

## 2024-03-22 PROBLEM — K21.9 GASTRO-ESOPHAGEAL REFLUX DISEASE WITHOUT ESOPHAGITIS: Chronic | Status: ACTIVE | Noted: 2024-02-23

## 2024-03-22 PROBLEM — I10 ESSENTIAL (PRIMARY) HYPERTENSION: Chronic | Status: ACTIVE | Noted: 2024-02-23

## 2024-03-22 PROBLEM — E78.5 HYPERLIPIDEMIA, UNSPECIFIED: Chronic | Status: ACTIVE | Noted: 2024-02-23

## 2024-03-22 PROBLEM — E11.9 TYPE 2 DIABETES MELLITUS WITHOUT COMPLICATIONS: Chronic | Status: ACTIVE | Noted: 2024-02-23

## 2024-05-17 NOTE — PROGRESS NOTE ADULT - PROBLEM SELECTOR PLAN 1
- patient presented with a fall and was left on the floor for a day   - On admission CK 2416, trending down, now XXX  - EKG NSR  - S/p IVF 1 L NS bolus   - C/w IVF hydration 75 cc/hr - patient presented with a fall and was left on the floor for a day   - On admission CK 2416, trending down  - EKG NSR  - S/p IVF 1 L NS bolus   - C/w IVF hydration 70 cc/hr x 24 hrs and reassess Unknown - patient presented with a fall and was left on the floor for a day   - On admission CK 2416, trending down, today 641  - EKG NSR  - S/p IVF 1 L NS bolus   - C/w IVF hydration 70 cc/hr x 24 hrs and reassess

## 2024-06-03 NOTE — PATIENT PROFILE ADULT - VISION (WITH CORRECTIVE LENSES IF THE PATIENT USUALLY WEARS THEM):
[facility] at [facility]   [address] [place] [address]   W: [phone]  F: [phone]    Medical Nutrition Therapy  Nutrition Encounter:     Met with wife, she came by to  formula.  He has felt poorly all weekend. She isn't sure what the plan will be, but does not want to continue with this if he will not get better.  She did say the ativan has helped with the nausea.     He has been doing 2 cartons of NormOxys 1.5 peptide daily.  Using gravity bags, takes about 2 hours to get 11oz carton down.  He is averaging about 910 calories daily.      In between feeds, she has been giving him gatorade.        Diagnosis: Metastatic GEJ carcinoma   Diabetic   Barium swallow esophogram on 5/12/23 showed:  There is narrowing of the distal esophagus just above the hernia which demonstrates relatively smooth margins and may represent a benign stricture. 12 mm barium tablet was retained in this region.      Ht Readings from Last 1 Encounters:   05/29/24 1.778 m (5' 10\")       Wt Readings from Last 5 Encounters:   05/29/24 87.4 kg (192 lb 9.6 oz)   05/22/24 89.8 kg (198 lb)   05/22/24 89.8 kg (197 lb 15.6 oz)   05/13/24 93.4 kg (206 lb)   05/08/24 92.6 kg (204 lb 1.6 oz)       Estimated Nutrition Needs:   Calorie Range: 2400-2875kcal/day      Protein Range: 95-114g/day     Fluid Needs: 2000ml      Plan:  - Take lomotil 4 times daily- increase to 5mg dose 4 times daily as needed.   -  Take 2 doses imodium after 1st loose stool, then 1 dose after each up to 8 daily.   - Samples ordered to patient- [employer] 1.4 Peptide         Signed By: EDWIGE BUNCH RD       Partially impaired: cannot see medication labels or newsprint, but can see obstacles in path, and the surrounding layout; can count fingers at arm's length

## 2024-08-23 ENCOUNTER — APPOINTMENT (OUTPATIENT)
Dept: GASTROENTEROLOGY | Facility: CLINIC | Age: 79
End: 2024-08-23

## 2025-06-19 NOTE — ED PROVIDER NOTE - CRANIAL NERVE AND PUPILLARY EXAM
If you are a smoker, it is important for your health to stop smoking. Please be aware that second hand smoke is also harmful.
cranial nerves 2-12 intact